# Patient Record
Sex: FEMALE | Race: WHITE | NOT HISPANIC OR LATINO | Employment: FULL TIME | URBAN - METROPOLITAN AREA
[De-identification: names, ages, dates, MRNs, and addresses within clinical notes are randomized per-mention and may not be internally consistent; named-entity substitution may affect disease eponyms.]

---

## 2017-05-26 LAB — HEP C HIM: NORMAL

## 2018-06-07 LAB — PAP-ABSTRACT: NORMAL

## 2018-08-20 LAB — HIV 1&2 EXT: NORMAL

## 2018-11-07 ENCOUNTER — TRANSFERRED RECORDS (OUTPATIENT)
Dept: HEALTH INFORMATION MANAGEMENT | Facility: CLINIC | Age: 23
End: 2018-11-07

## 2018-12-13 ENCOUNTER — TRANSFERRED RECORDS (OUTPATIENT)
Dept: HEALTH INFORMATION MANAGEMENT | Facility: CLINIC | Age: 23
End: 2018-12-13

## 2019-07-09 ENCOUNTER — TRANSFERRED RECORDS (OUTPATIENT)
Dept: HEALTH INFORMATION MANAGEMENT | Facility: CLINIC | Age: 24
End: 2019-07-09

## 2019-07-09 LAB
CREAT SERPL-MCNC: 0.9 MG/DL (ref 0.4–1)
GFR SERPL CREATININE-BSD FRML MDRD: >60 ML/MIN/1.73M2
GLUCOSE SERPL-MCNC: 88 MG/DL (ref 70–99)
POTASSIUM SERPL-SCNC: 4.6 MEQ/L (ref 3.4–5.1)

## 2019-11-01 ENCOUNTER — OFFICE VISIT (OUTPATIENT)
Dept: URGENT CARE | Facility: URGENT CARE | Age: 24
End: 2019-11-01
Payer: COMMERCIAL

## 2019-11-01 VITALS
HEIGHT: 64 IN | SYSTOLIC BLOOD PRESSURE: 116 MMHG | TEMPERATURE: 98.2 F | HEART RATE: 85 BPM | DIASTOLIC BLOOD PRESSURE: 77 MMHG | OXYGEN SATURATION: 100 % | RESPIRATION RATE: 20 BRPM | WEIGHT: 152 LBS | BODY MASS INDEX: 25.95 KG/M2

## 2019-11-01 DIAGNOSIS — M26.609 TMJ (TEMPOROMANDIBULAR JOINT SYNDROME): ICD-10-CM

## 2019-11-01 DIAGNOSIS — G43.909 MIGRAINE WITHOUT STATUS MIGRAINOSUS, NOT INTRACTABLE, UNSPECIFIED MIGRAINE TYPE: Primary | ICD-10-CM

## 2019-11-01 PROCEDURE — 99204 OFFICE O/P NEW MOD 45 MIN: CPT | Mod: 25 | Performed by: PHYSICIAN ASSISTANT

## 2019-11-01 PROCEDURE — 96372 THER/PROPH/DIAG INJ SC/IM: CPT | Performed by: PHYSICIAN ASSISTANT

## 2019-11-01 RX ORDER — CYCLOBENZAPRINE HCL 10 MG
10 TABLET ORAL 2 TIMES DAILY PRN
Qty: 30 TABLET | Refills: 0 | Status: SHIPPED | OUTPATIENT
Start: 2019-11-01 | End: 2020-02-04

## 2019-11-01 RX ORDER — AMITRIPTYLINE HYDROCHLORIDE 10 MG/1
TABLET ORAL
COMMUNITY
Start: 2019-06-18 | End: 2020-01-08

## 2019-11-01 RX ORDER — DEXTROAMPHETAMINE SACCHARATE, AMPHETAMINE ASPARTATE, DEXTROAMPHETAMINE SULFATE AND AMPHETAMINE SULFATE 1.25; 1.25; 1.25; 1.25 MG/1; MG/1; MG/1; MG/1
TABLET ORAL
COMMUNITY
Start: 2019-06-18 | End: 2020-02-04

## 2019-11-01 RX ORDER — DEXTROAMPHETAMINE SACCHARATE, AMPHETAMINE ASPARTATE MONOHYDRATE, DEXTROAMPHETAMINE SULFATE AND AMPHETAMINE SULFATE 5; 5; 5; 5 MG/1; MG/1; MG/1; MG/1
20 CAPSULE, EXTENDED RELEASE ORAL
COMMUNITY
Start: 2019-06-18 | End: 2020-02-04

## 2019-11-01 RX ORDER — PROPRANOLOL HYDROCHLORIDE 60 MG/1
60 TABLET ORAL
COMMUNITY
End: 2020-12-11

## 2019-11-01 RX ORDER — KETOROLAC TROMETHAMINE 30 MG/ML
60 INJECTION, SOLUTION INTRAMUSCULAR; INTRAVENOUS ONCE
Qty: 2 ML | Refills: 0 | Status: SHIPPED | OUTPATIENT
Start: 2019-11-01 | End: 2020-02-04

## 2019-11-01 SDOH — HEALTH STABILITY: MENTAL HEALTH: HOW OFTEN DO YOU HAVE A DRINK CONTAINING ALCOHOL?: NEVER

## 2019-11-01 ASSESSMENT — MIFFLIN-ST. JEOR: SCORE: 1435.66

## 2019-11-01 ASSESSMENT — PAIN SCALES - GENERAL: PAINLEVEL: EXTREME PAIN (8)

## 2019-11-02 NOTE — NURSING NOTE
Clinic Administered Medication Documentation      Injectable Medication Documentation    Patient was given Ketorolac Tromethamine (Toradol). Prior to medication administration, verified patients identity using patient s name and date of birth. Please see MAR and medication order for additional information. Patient instructed to remain in clinic for 15 minutes.      Was entire vial of medication used? Yes  Vial/Syringe: Single dose vial  Expiration Date:  03/21  Was this medication supplied by the patient? No     The following medication was given:     MEDICATION: Ketorolac Tromethamine 60MG/2ML (30 mg/mL) (Toradol)  ROUTE: IM  SITE: Ventrogluteal - Left  DOSE: 60 mg  LOT #: [id number]  :  [employer]  EXPIRATION DATE:  03/21  NDC#: 54013-354-48     Clark Quinonez CMA

## 2019-11-02 NOTE — PROGRESS NOTES
"SUBJECTIVE:   Jemima Augustine is a 24 year old female seen in clinc today with concerns of a headache.  Has a history of migraine headaches.  Saw her neurologist about 1 year ago.  Currently uses propranolol and amitriptyline.  Had tried Imitrex in the past.  3 days ago had a Toradol injection which only relieved her pain for about a day.  Nausea, no vomiting.  No photophobia.  No blurry vision.  Gets migraine headaches approximately every 2 months.  No recent head injury.  She states she did have a concussion after falling approximately 1 year ago.  Tried one oral Toradol tablet yesterday.    She has a history of TMJ and has been given a marked guard by a dentist.  However she has not been using a mouthguard.  The mouthguard was fitted before her wisdom teeth came in.    Description of pain: dull pain, bilateral in the frontal area, wraps to both sides.   Severity of pain:5/10  Associated symptoms: nausea  Warning signs: None      Precipitating factors: not related to menses.       Past medical history and medications were reviewed and are up to date.    CONSTITUTIONAL: Negative for fatigue or fever.  EYES: Negative for eye problems.  ENT: neg for ST.  RESP: neg for SOB.  CV: Negative for chest pains.  GI: Negative for vomiting.  MUSCULOSKELETAL:  Negative for significant muscle or joint pains.  NEUROLOGIC: as above.  SKIN: Negative for rash.  PSYCH: Normal mentation for age.  -negative for dysuria    PMHX- TMJ  FMHX- no known diabetes  Social- nonsmoker    OBJECTIVE:  Blood pressure 116/77, pulse 85, temperature 98.2  F (36.8  C), temperature source Oral, resp. rate 20, height 1.628 m (5' 4.08\"), weight 68.9 kg (152 lb), SpO2 100 %.    General: No apparent distress, alert and oriented.  HEENT:  EOMI, PERRL, sclera and conjunctiva normal.   Neck:  supple, no lymphadenopathy  Chest:  Lungs clear to auscultation bilaterally.  Cardiovascular: regular rate and rhythm, no murmurs.  Musculoskeletal: no gross " deformities, normal muscle tone  Psychological:  Affect and mentation normal.  Speech fluent.  Judgement and insight intact.  Neurological:    Cranial nerves 2-12 are grossly normal.    Strength 5/5  and symmetric in upper and lower extremities.     Sensation to light touch grossly intact throughout    Reflexes 2+ and symmetrical at biceps, knees.    Gait is normal.  TMJ-significant left greater than right TMJ popping and clicking with opening and closing her mouth.  Temples nontender to palpation  Negative pronator drift.  No slurred speech  Smile symmetric.  Abdomen-soft, nontender.    ASSESSMENT:    ICD-10-CM    1. Migraine without status migrainosus, not intractable, unspecified migraine type G43.909 ketorolac (TORADOL) 60 MG/2ML injection     cyclobenzaprine (FLEXERIL) 10 MG tablet     INJECTION INTRAMUSCULAR OR SUB-Q     NEUROLOGY ADULT REFERRAL   2. TMJ (temporomandibular joint syndrome) M26.609 ketorolac (TORADOL) 60 MG/2ML injection     cyclobenzaprine (FLEXERIL) 10 MG tablet     INJECTION INTRAMUSCULAR OR SUB-Q     NEUROLOGY ADULT REFERRAL       PLAN: She just moved to [place].  She would like referral to a different neurologist.  I suspect that her headaches are not specific just to migraine.  I feel her TMJ is coming into play.  I told her to try her mouthguard.  If it makes the pain worse than she is instructed to see her dentist for another mouthguard.  We will add muscle relaxant at bedtime.  She may also use it during the day but should not drive if doing so.  She has oral Toradol but she is instructed not to use this at all.  If the Toradol injection does not help she should go to the emergency room or see her primary care physician back in 3 days.  Discussed worrisome signs or symptoms that warrant a trip to the ER.  Magali Cook PA-C

## 2019-12-03 ENCOUNTER — OFFICE VISIT (OUTPATIENT)
Dept: URGENT CARE | Facility: URGENT CARE | Age: 24
End: 2019-12-03
Payer: COMMERCIAL

## 2019-12-03 VITALS
HEART RATE: 93 BPM | TEMPERATURE: 98.3 F | WEIGHT: 150.6 LBS | BODY MASS INDEX: 25.79 KG/M2 | DIASTOLIC BLOOD PRESSURE: 77 MMHG | SYSTOLIC BLOOD PRESSURE: 125 MMHG | OXYGEN SATURATION: 97 %

## 2019-12-03 DIAGNOSIS — R51.9 ACUTE NONINTRACTABLE HEADACHE, UNSPECIFIED HEADACHE TYPE: Primary | ICD-10-CM

## 2019-12-03 DIAGNOSIS — F41.9 ANXIETY: ICD-10-CM

## 2019-12-03 PROBLEM — G43.909 MIGRAINES: Status: ACTIVE | Noted: 2019-12-03

## 2019-12-03 PROBLEM — R56.9 SEIZURE-LIKE ACTIVITY (H): Status: ACTIVE | Noted: 2019-06-18

## 2019-12-03 PROBLEM — F90.0 ADHD (ATTENTION DEFICIT HYPERACTIVITY DISORDER), INATTENTIVE TYPE: Status: ACTIVE | Noted: 2018-05-15

## 2019-12-03 PROCEDURE — 96372 THER/PROPH/DIAG INJ SC/IM: CPT | Performed by: NURSE PRACTITIONER

## 2019-12-03 PROCEDURE — 99214 OFFICE O/P EST MOD 30 MIN: CPT | Mod: 25 | Performed by: NURSE PRACTITIONER

## 2019-12-03 RX ORDER — KETOROLAC TROMETHAMINE 30 MG/ML
60 INJECTION, SOLUTION INTRAMUSCULAR; INTRAVENOUS ONCE
Status: COMPLETED | OUTPATIENT
Start: 2019-12-03 | End: 2019-12-03

## 2019-12-03 RX ORDER — LORAZEPAM 0.5 MG/1
0.5 TABLET ORAL EVERY 8 HOURS PRN
Qty: 10 TABLET | Refills: 0 | Status: SHIPPED | OUTPATIENT
Start: 2019-12-03 | End: 2019-12-03

## 2019-12-03 RX ADMIN — KETOROLAC TROMETHAMINE 60 MG: 30 INJECTION, SOLUTION INTRAMUSCULAR; INTRAVENOUS at 18:25

## 2019-12-03 ASSESSMENT — ENCOUNTER SYMPTOMS
NECK PAIN: 1
FEVER: 0
ABDOMINAL PAIN: 0
LIGHT-HEADEDNESS: 0
RHINORRHEA: 0
NAUSEA: 0
SORE THROAT: 0
HEADACHES: 1
DIZZINESS: 0
MYALGIAS: 1
VOMITING: 0
COUGH: 0

## 2019-12-03 NOTE — PROGRESS NOTES
SUBJECTIVE:   Jemima Augustine is a 24 year old female presenting with a chief complaint of   Chief Complaint   Patient presents with     Headache     Headache x 3-4 days       She is an established patient of [facility].    Headache    Onset of symptoms was 4day(s).  Course of illness is worsening  Severity moderate  Character of pain:aching and throbbing   Current and associated symptoms: light sensitivity and visual disturbances  Location of pain: frontal and temporal  Prodromal sx?:  No  History of Migranes: No  Is headache similar to previous: Yes  Predisposing factors: None  Treatment and measures tried: imitrex  Patient was seen about a month ago for migraine headaches, was thought that TMJ was also a factor in her headaches. Was started on flexeril and was also given referral to neurology. Patient states wasn't able to get appointment with neurology until January. States unsure if flexeril is helping.     Patient also discussed that she has had increase in anxiety. States specifically separation anxiety. History of being adopted. Patient states had hydroxyzine in the past, but states just made her sleepy, unsure if helped with anxiety.        Review of Systems   Constitutional: Negative for fever.   HENT: Negative for congestion, ear pain, rhinorrhea and sore throat.    Eyes: Positive for visual disturbance.   Respiratory: Negative for cough.    Gastrointestinal: Negative for abdominal pain, nausea and vomiting.   Musculoskeletal: Positive for myalgias and neck pain.   Skin: Negative for rash.   Neurological: Positive for headaches. Negative for dizziness and light-headedness.       History reviewed. No pertinent past medical history.  History reviewed. No pertinent family history.  Current Outpatient Medications   Medication Sig Dispense Refill     amitriptyline (ELAVIL) 25 MG tablet Take 1 tablet (25 mg) by mouth At Bedtime 30 tablet 0     amitriptyline (ELAVIL) 25 MG tablet Take 25 mg by mouth        amphetamine-dextroamphetamine (ADDERALL XR) 20 MG 24 hr capsule Take 20 mg by mouth       LORazepam (ATIVAN) 0.5 MG tablet Take 1 tablet (0.5 mg) by mouth every 8 hours as needed for anxiety 10 tablet 0     propranolol (INDERAL) 60 MG tablet Take 60 mg by mouth       amitriptyline (ELAVIL) 10 MG tablet        amphetamine-dextroamphetamine (ADDERALL) 5 MG tablet        Social History     Tobacco Use     Smoking status: Never Smoker     Smokeless tobacco: Never Used   Substance Use Topics     Alcohol use: Never     Frequency: Never       OBJECTIVE  /77   Pulse 93   Temp 98.3  F (36.8  C) (Oral)   Wt 68.3 kg (150 lb 9.6 oz)   SpO2 97%   BMI 25.79 kg/m      Physical Exam  Vitals signs and nursing note reviewed.   Constitutional:       Appearance: Normal appearance. She is well-developed.   HENT:      Head: Normocephalic and atraumatic.      Right Ear: Tympanic membrane, ear canal and external ear normal.      Left Ear: Tympanic membrane, ear canal and external ear normal.      Nose: Nose normal.      Mouth/Throat:      Pharynx: Oropharynx is clear.   Eyes:      Extraocular Movements: Extraocular movements intact.      Conjunctiva/sclera: Conjunctivae normal.      Pupils: Pupils are equal, round, and reactive to light.   Neck:      Musculoskeletal: Normal range of motion and neck supple.   Cardiovascular:      Rate and Rhythm: Normal rate and regular rhythm.      Heart sounds: Normal heart sounds.   Pulmonary:      Effort: Pulmonary effort is normal.      Breath sounds: Normal breath sounds and air entry.   Lymphadenopathy:      Head:      Right side of head: No submandibular or tonsillar adenopathy.      Left side of head: No submandibular or tonsillar adenopathy.      Cervical: No cervical adenopathy.   Skin:     General: Skin is warm and dry.      Capillary Refill: Capillary refill takes less than 2 seconds.   Neurological:      General: No focal deficit present.      Mental Status: She is alert and  oriented to person, place, and time.      GCS: GCS eye subscore is 4. GCS verbal subscore is 5. GCS motor subscore is 6.      Cranial Nerves: Cranial nerves are intact.   Psychiatric:         Behavior: Behavior is cooperative.           ASSESSMENT:      ICD-10-CM    1. Acute nonintractable headache, unspecified headache type R51 ketorolac (TORADOL) injection 60 mg     amitriptyline (ELAVIL) 25 MG tablet     KETOROLAC TROMETHAMINE 15MG     INJECTION INTRAMUSCULAR OR SUB-Q   2. Anxiety F41.9 LORazepam (ATIVAN) 0.5 MG tablet        Medical Decision Making:    Differential Diagnosis:  Headache:  Tension headache, Common migraine, anxiety    Serious Comorbid Conditions:  Adult:  None    PLAN:  Discussed with patient will do toradol injection here today. Discussed increasing her dose of amitriptyline to 25 mg at bedtime from 10 mg to see if would help manage her headaches better. Did also discussed anxiety. Was going to give patient a small amount of lorazepam for situational anxiety/separation anxietyand given prescription for the increased dose of amitriptyline however patient left clinic prior to recheck after toradol injection and continued discussed of treatment recommendations.     Followup:    If not improving or if condition worsens, follow up with your Primary Care Provider    Patient Instructions     Increase dose of amitriptyline to 25 mg at bedtime  Lorazepam as needed every 8 hours for anxiety  Follow up with neurology  Follow up with primary care provider regarding anxiety within 2 weeks      Patient Education     Self-Care for Headaches  Most headaches aren't serious and can be relieved with self-care. But some headaches may be a sign of another health problem like eye trouble or high blood pressure. To find the best treatment, learn what kind of headaches you get. For tension headaches, self-care will usually help. To treat migraines, ask your healthcare provider for advice. It is also possible to get both  "tension and migraine headaches. Self-care involves relieving the pain and avoiding headache  triggers  if you can.    Ways to reduce pain and tension  Try these steps:    Apply a cold compress or ice pack to the pain site.    Drink fluids. If nausea makes it hard to drink, try sucking on ice.    Rest. Protect yourself from bright light and loud noises.    Calm your emotions by imagining a peaceful scene.    Massage tight neck, shoulder, and head muscles.    To relax muscles, soak in a hot bath or use a hot shower.  Use medicines  Aspirin or other over-the-counter pain medicines, such as ibuprofen and acetaminophen, can relieve headache. Remember: Never give aspirin to anyone 18 years old or younger because of the risk of developing Reye syndrome. Use pain medicines only when needed. Certain prescription medicines, if taken too often, can lead to rebound headaches. Check with your healthcare provider or pharmacist about your medicines.  Track your headaches  Keeping a headache diary can help you and your healthcare provider identify what's causing your headaches:    Note when each headache happens.    Identify your activities and the foods you've eaten 6 to 8 hours before the headache began.    Look for any trends or \"triggers.\"  Signs of tension headache  Any of the following can be signs:    Dull pain or feeling of pressure in a tight band around your head    Pain in your neck or shoulders    Headache without a definite beginning or end    Headache after an activity such as driving or working on a computer  Signs of migraine  Any of the following can be signs:    Throbbing pain on one or both sides of your head    Nausea or vomiting    Extreme sensitivity to light, sound, and smells    Bright spots, flashes, or other visual changes    Pain or nausea so severe that you can't continue your daily activities  Call your healthcare provider   If you have any of the following symptoms, contact your healthcare " "provider:    A headache that lingers after a recent injury or bump to the head.    A fever with a stiff neck or pain when you bend your head toward your chest.    A headache along with slurred speech, changes in your vision, or numbness or weakness in your arms or legs.    A headache for longer than 3 days.    Frequent headaches, especially in the morning.    Headaches with seizures     Seek immediate medical attention if you have a headache that you would call \"the worst headache you have ever had.\"  Date Last Reviewed: 3/1/2018    8984-8866 [employer]. [address] [place], [place] [id number]. All rights reserved. This information is not intended as a substitute for professional medical care. Always follow your healthcare professional's instructions.           Patient Education     Your Body s Response to Anxiety    Normal anxiety is part of the body s natural defense system. It's an alert to a threat that is unknown, vague, or comes from your own internal fears. While you re in this state, your feelings can range from a vague sense of worry to physical sensations such as a pounding heartbeat. These feelings make you want to react to the threat. An anxiety response is normal in many situations. But when you have an anxiety disorder, the same response can occur at the wrong times.  Anxiety can be helpful  Normal anxiety is a signal from your brain that warns you of a threat and is a normal response to help you prevent something or decrease the bad effects of something you can't control. For example, anxiety is a normal response to situations that might damage your body, separate you from a loved one, or lose your job. The symptoms of anxiety can be physical and mental.  How does it feel?  At certain times, people with anxiety may have:    Dizziness    Muscle tension or pain    Restlessness    Sleeplessness    Trouble concentrating    Racing heartbeat    Fast breathing    Shaking or " "trembling    Stomachache    Diarrhea    Loss of energy    Sweating    Cold, clammy hands    Chest pain    Dry mouth  Anxiety can also be a problem  Anxiety can become a problem when it is hard to control, occurs for months, and interferes with important parts of your life. With an anxiety disorder, your body has the response described above, but in inappropriate ways. The response a person has depends on the anxiety disorder he or she has. With some disorders, the anxiety is way out of proportion to the threat that triggers it. With others, anxiety may occur even when there isn t a clear threat or trigger.  Who does it affect?  Some people are more prone to persistent anxiety than others. It tends to run in families, and it affects more younger people than older people, and more women than men. But no age, race, or gender is immune to anxiety problems.  Anxiety can be treated  The good news is that the anxiety that s disrupting your life can be treated. Check with your healthcare provider and rule out any physical problems that may be causing the anxiety symptoms. If an anxiety disorder is diagnosed seek mental healthcare. This is an illness and it can respond to treatment. Most types of anxiety disorders will respond to \"talk therapy\" and medicines. Working with your doctor or other healthcare provider, you can develop skills to help you cope with anxiety. You can also gain the perspective you need to overcome your fears. Note: Good sources of support or guidance can be found at your local hospital, mental health clinic, or an employee assistance program.  How to cope with anxiety  If anxiety is wearing you down, here are some things you can do to cope:    Keep in mind that you can t control everything about a situation. Change what you can and let the rest take its course.    Exercise--it s a great way to relieve tension and help your body feel relaxed.    Avoid caffeine and nicotine, which can make anxiety " symptoms worse.    Fight the temptation to turn to alcohol or unprescribed drugs for relief. They only make things worse in the long run.    Educate yourself about anxiety disorders. Keep track of helpful online resources and books you can use during stressful periods.    Try stress management techniques such as meditation.    Consider online or in-person support groups.   Date Last Reviewed: 1/1/2017 2000-2018 [employer]. [address] [place] [address], [place] [address]. All rights reserved. This information is not intended as a substitute for professional medical care. Always follow your healthcare professional's instructions.

## 2019-12-04 NOTE — NURSING NOTE
Clinic Administered Medication Documentation    MEDICATION LIST:   Injectable Medication Documentation    Patient was given Ketorolac Tromethamine (Toradol). Prior to medication administration, verified patients identity using patient s name and date of birth. Please see MAR and medication order for additional information. Patient instructed to remain in clinic for 15 minutes and report any adverse reaction to staff immediately .      Was entire vial of medication used? Yes  Vial/Syringe: Single dose vial  Expiration Date:  07/21  Was this medication supplied by the patient? No     The following medication was given:     MEDICATION: Ketorolac Tromethamine 60MG/2ML (30 mg/mL) (Toradol)  ROUTE: IM  SITE: Ventrogluteal - Right  DOSE: 60mg  LOT #: [id number]  :  [employer]  EXPIRATION DATE:  07/21  NDC#: 37203-510-94    Alexandria Agarwal CMA

## 2019-12-04 NOTE — PATIENT INSTRUCTIONS
"  Follow up with neurology  Follow up with primary care provider regarding anxiety within 2 weeks      Patient Education     Self-Care for Headaches  Most headaches aren't serious and can be relieved with self-care. But some headaches may be a sign of another health problem like eye trouble or high blood pressure. To find the best treatment, learn what kind of headaches you get. For tension headaches, self-care will usually help. To treat migraines, ask your healthcare provider for advice. It is also possible to get both tension and migraine headaches. Self-care involves relieving the pain and avoiding headache  triggers  if you can.    Ways to reduce pain and tension  Try these steps:    Apply a cold compress or ice pack to the pain site.    Drink fluids. If nausea makes it hard to drink, try sucking on ice.    Rest. Protect yourself from bright light and loud noises.    Calm your emotions by imagining a peaceful scene.    Massage tight neck, shoulder, and head muscles.    To relax muscles, soak in a hot bath or use a hot shower.  Use medicines  Aspirin or other over-the-counter pain medicines, such as ibuprofen and acetaminophen, can relieve headache. Remember: Never give aspirin to anyone 18 years old or younger because of the risk of developing Reye syndrome. Use pain medicines only when needed. Certain prescription medicines, if taken too often, can lead to rebound headaches. Check with your healthcare provider or pharmacist about your medicines.  Track your headaches  Keeping a headache diary can help you and your healthcare provider identify what's causing your headaches:    Note when each headache happens.    Identify your activities and the foods you've eaten 6 to 8 hours before the headache began.    Look for any trends or \"triggers.\"  Signs of tension headache  Any of the following can be signs:    Dull pain or feeling of pressure in a tight band around your head    Pain in your neck or " "shoulders    Headache without a definite beginning or end    Headache after an activity such as driving or working on a computer  Signs of migraine  Any of the following can be signs:    Throbbing pain on one or both sides of your head    Nausea or vomiting    Extreme sensitivity to light, sound, and smells    Bright spots, flashes, or other visual changes    Pain or nausea so severe that you can't continue your daily activities  Call your healthcare provider   If you have any of the following symptoms, contact your healthcare provider:    A headache that lingers after a recent injury or bump to the head.    A fever with a stiff neck or pain when you bend your head toward your chest.    A headache along with slurred speech, changes in your vision, or numbness or weakness in your arms or legs.    A headache for longer than 3 days.    Frequent headaches, especially in the morning.    Headaches with seizures     Seek immediate medical attention if you have a headache that you would call \"the worst headache you have ever had.\"  Date Last Reviewed: 3/1/2018    3068-1310 [employer]. [address] [place] [address]. All rights reserved. This information is not intended as a substitute for professional medical care. Always follow your healthcare professional's instructions.           Patient Education     Your Body s Response to Anxiety    Normal anxiety is part of the body s natural defense system. It's an alert to a threat that is unknown, vague, or comes from your own internal fears. While you re in this state, your feelings can range from a vague sense of worry to physical sensations such as a pounding heartbeat. These feelings make you want to react to the threat. An anxiety response is normal in many situations. But when you have an anxiety disorder, the same response can occur at the wrong times.  Anxiety can be helpful  Normal anxiety is a signal from your brain that warns you of a threat " "and is a normal response to help you prevent something or decrease the bad effects of something you can't control. For example, anxiety is a normal response to situations that might damage your body, separate you from a loved one, or lose your job. The symptoms of anxiety can be physical and mental.  How does it feel?  At certain times, people with anxiety may have:    Dizziness    Muscle tension or pain    Restlessness    Sleeplessness    Trouble concentrating    Racing heartbeat    Fast breathing    Shaking or trembling    Stomachache    Diarrhea    Loss of energy    Sweating    Cold, clammy hands    Chest pain    Dry mouth  Anxiety can also be a problem  Anxiety can become a problem when it is hard to control, occurs for months, and interferes with important parts of your life. With an anxiety disorder, your body has the response described above, but in inappropriate ways. The response a person has depends on the anxiety disorder he or she has. With some disorders, the anxiety is way out of proportion to the threat that triggers it. With others, anxiety may occur even when there isn t a clear threat or trigger.  Who does it affect?  Some people are more prone to persistent anxiety than others. It tends to run in families, and it affects more younger people than older people, and more women than men. But no age, race, or gender is immune to anxiety problems.  Anxiety can be treated  The good news is that the anxiety that s disrupting your life can be treated. Check with your healthcare provider and rule out any physical problems that may be causing the anxiety symptoms. If an anxiety disorder is diagnosed seek mental healthcare. This is an illness and it can respond to treatment. Most types of anxiety disorders will respond to \"talk therapy\" and medicines. Working with your doctor or other healthcare provider, you can develop skills to help you cope with anxiety. You can also gain the perspective you need to " overcome your fears. Note: Good sources of support or guidance can be found at your local hospital, mental health clinic, or an employee assistance program.  How to cope with anxiety  If anxiety is wearing you down, here are some things you can do to cope:    Keep in mind that you can t control everything about a situation. Change what you can and let the rest take its course.    Exercise--it s a great way to relieve tension and help your body feel relaxed.    Avoid caffeine and nicotine, which can make anxiety symptoms worse.    Fight the temptation to turn to alcohol or unprescribed drugs for relief. They only make things worse in the long run.    Educate yourself about anxiety disorders. Keep track of helpful online resources and books you can use during stressful periods.    Try stress management techniques such as meditation.    Consider online or in-person support groups.   Date Last Reviewed: 1/1/2017 2000-2018 [employer] [address] [place] [address], [place] [address]. All rights reserved. This information is not intended as a substitute for professional medical care. Always follow your healthcare professional's instructions.

## 2019-12-06 ENCOUNTER — OFFICE VISIT (OUTPATIENT)
Dept: URGENT CARE | Facility: URGENT CARE | Age: 24
End: 2019-12-06
Payer: COMMERCIAL

## 2019-12-06 VITALS
WEIGHT: 151.4 LBS | HEART RATE: 90 BPM | SYSTOLIC BLOOD PRESSURE: 118 MMHG | TEMPERATURE: 97.6 F | OXYGEN SATURATION: 100 % | DIASTOLIC BLOOD PRESSURE: 75 MMHG | BODY MASS INDEX: 25.93 KG/M2

## 2019-12-06 DIAGNOSIS — J02.9 SORE THROAT: Primary | ICD-10-CM

## 2019-12-06 DIAGNOSIS — R49.0 HOARSENESS: ICD-10-CM

## 2019-12-06 DIAGNOSIS — J06.9 VIRAL UPPER RESPIRATORY TRACT INFECTION WITH COUGH: ICD-10-CM

## 2019-12-06 DIAGNOSIS — R51.9 ACUTE NONINTRACTABLE HEADACHE, UNSPECIFIED HEADACHE TYPE: ICD-10-CM

## 2019-12-06 LAB
DEPRECATED S PYO AG THROAT QL EIA: NORMAL
SPECIMEN SOURCE: NORMAL

## 2019-12-06 PROCEDURE — 87081 CULTURE SCREEN ONLY: CPT | Performed by: PHYSICIAN ASSISTANT

## 2019-12-06 PROCEDURE — 87880 STREP A ASSAY W/OPTIC: CPT | Performed by: PHYSICIAN ASSISTANT

## 2019-12-06 PROCEDURE — 99214 OFFICE O/P EST MOD 30 MIN: CPT | Performed by: PHYSICIAN ASSISTANT

## 2019-12-06 RX ORDER — BENZONATATE 100 MG/1
CAPSULE ORAL
Qty: 20 CAPSULE | Refills: 0 | Status: SHIPPED | OUTPATIENT
Start: 2019-12-06 | End: 2020-02-04

## 2019-12-06 RX ORDER — METHYLPREDNISOLONE 4 MG
TABLET, DOSE PACK ORAL
Qty: 21 TABLET | Refills: 0 | Status: SHIPPED | OUTPATIENT
Start: 2019-12-06 | End: 2020-02-04

## 2019-12-06 NOTE — PROGRESS NOTES
S: 24-year-old female presents for evaluation of sore throat for  3 days.  Onset of cough yesterday. Severe hoarseness.   Cough is nonproductive.  No fever.  No vomiting or diarrhea.  No rash.  Has had headache with it.  Has history of migraines.  I did see her for this not too long ago and was given referral to neurology.  She states she has an appointment in January.        Allergies   Allergen Reactions     Lisdexamfetamine Other (See Comments)        past medical history-history of migraines     amitriptyline (ELAVIL) 10 MG tablet,   amitriptyline (ELAVIL) 25 MG tablet, Take 25 mg by mouth  amphetamine-dextroamphetamine (ADDERALL) 5 MG tablet,   propranolol (INDERAL) 60 MG tablet, Take 60 mg by mouth  [] amphetamine-dextroamphetamine (ADDERALL XR) 20 MG 24 hr capsule, Take 20 mg by mouth  [] cyclobenzaprine (FLEXERIL) 10 MG tablet, Take 1 tablet (10 mg) by mouth 2 times daily as needed for muscle spasms  [] ketorolac (TORADOL) 60 MG/2ML injection, Inject 2 mLs (60 mg) into the muscle once for 1 dose    No current facility-administered medications on file prior to visit.       Social History     Tobacco Use     Smoking status: Never Smoker     Smokeless tobacco: Never Used   Substance Use Topics     Alcohol use: Never     Frequency: Never       ROS:  CONSTITUTIONAL: Negative for fatigue or fever.  EYES: Negative for eye problems.  ENT: As above.  RESP: As above.  CV: Negative for chest pains.  GI: Negative for vomiting.  MUSCULOSKELETAL:  Negative for significant muscle or joint pains.  NEUROLOGIC: Negative for headaches.  SKIN: Negative for rash.  PSYCH: Normal mentation for age.    OBJECTIVE:  /75 (BP Location: Left arm, Patient Position: Chair, Cuff Size: Adult Regular)   Pulse 90   Temp 97.6  F (36.4  C) (Oral)   Wt 68.7 kg (151 lb 6.4 oz)   SpO2 100%   BMI 25.93 kg/m    GENERAL APPEARANCE: very hoarse voice..  EYES:Conjunctiva/sclera clear.  EARS: No cerumen.   Ear canals w/o  erythema.  TM's intact w/o erythema.    NOSE/MOUTH: Nose without ulcers, erythema or lesions.  SINUSES: No maxillary sinus tenderness.  THROAT: Moderate to severe erythema w/o tonsillar enlargement . No exudates.  NECK: Supple, nontender, no lymphadenopathy.  RESP: Lungs clear to auscultation - no rales, rhonchi or wheezes  CV: Regular rate and rhythm, normal S1 S2, no murmur noted.  NEURO: Awake, alert    SKIN: No rashes    Rapid strep was neg    ASSESSMENT:     ICD-10-CM    1. Sore throat J02.9 Rapid strep screen     methylPREDNISolone (MEDROL DOSEPAK) 4 MG tablet therapy pack     benzonatate (TESSALON PERLES) 100 MG capsule     Beta strep group A culture   2. Viral upper respiratory tract infection with cough J06.9 Rapid strep screen    B97.89 methylPREDNISolone (MEDROL DOSEPAK) 4 MG tablet therapy pack     benzonatate (TESSALON PERLES) 100 MG capsule     Beta strep group A culture   3. Hoarseness R49.0 Rapid strep screen     methylPREDNISolone (MEDROL DOSEPAK) 4 MG tablet therapy pack     benzonatate (TESSALON PERLES) 100 MG capsule     Beta strep group A culture   4. Acute nonintractable headache, unspecified headache type R51            PLAN: Throat is very red here today.  Throat culture is pending.  Medrol Dosepak will help with the sore throat, hoarseness, cough.  Tessalon Perles can help with both the sore throat and the cough.  Medrol Dosepak may also help with the headache.  I have discussed clinical findings with patient.  Keep appointment with neurologist regarding her recurrent headaches/migraines.  Side effects of medications discussed.  Symptomatic care is discussed.  I have discussed the possibility of  worsening symptoms and to RETURN TO CLINIC 1 week prn.  All questions are answered and patient is in agreement with plan.   Patient care instructions are given to at the end of visit.   Lots of rest and fluids.    Magali Cook PA-C

## 2019-12-07 LAB
BACTERIA SPEC CULT: NORMAL
SPECIMEN SOURCE: NORMAL

## 2020-01-08 ENCOUNTER — OFFICE VISIT (OUTPATIENT)
Dept: NEUROLOGY | Facility: CLINIC | Age: 25
End: 2020-01-08
Attending: PHYSICIAN ASSISTANT
Payer: COMMERCIAL

## 2020-01-08 VITALS
RESPIRATION RATE: 14 BRPM | WEIGHT: 151 LBS | SYSTOLIC BLOOD PRESSURE: 132 MMHG | DIASTOLIC BLOOD PRESSURE: 76 MMHG | BODY MASS INDEX: 25.78 KG/M2 | HEART RATE: 83 BPM | OXYGEN SATURATION: 97 % | HEIGHT: 64 IN

## 2020-01-08 DIAGNOSIS — G43.009 MIGRAINE WITHOUT AURA AND WITHOUT STATUS MIGRAINOSUS, NOT INTRACTABLE: Primary | ICD-10-CM

## 2020-01-08 PROCEDURE — 99204 OFFICE O/P NEW MOD 45 MIN: CPT | Performed by: PSYCHIATRY & NEUROLOGY

## 2020-01-08 RX ORDER — RIZATRIPTAN BENZOATE 10 MG/1
10 TABLET, ORALLY DISINTEGRATING ORAL
Qty: 9 TABLET | Refills: 1 | Status: SHIPPED | OUTPATIENT
Start: 2020-01-08 | End: 2020-09-10

## 2020-01-08 RX ORDER — SUMATRIPTAN 100 MG/1
TABLET, FILM COATED ORAL
COMMUNITY
Start: 2019-07-17 | End: 2020-01-08

## 2020-01-08 RX ORDER — KETOROLAC TROMETHAMINE 10 MG/1
TABLET, FILM COATED ORAL
COMMUNITY
Start: 2019-06-18 | End: 2020-02-04

## 2020-01-08 ASSESSMENT — MIFFLIN-ST. JEOR: SCORE: 1429.93

## 2020-01-08 ASSESSMENT — PAIN SCALES - GENERAL: PAINLEVEL: MODERATE PAIN (4)

## 2020-01-08 NOTE — NURSING NOTE
"Jemima Augustine's goals for this visit include: Consult  She requests these members of her care team be copied on today's visit information: PCP    PCP: Bianca Gallagher    Referring Provider:  [place] [address]    /76 (BP Location: Right arm, Patient Position: Sitting, Cuff Size: Adult Regular)   Pulse 83   Resp 14   Ht 1.626 m (5' 4\")   Wt 68.5 kg (151 lb)   SpO2 97%   BMI 25.92 kg/m      Do you need any medication refills at today's visit? N    " Vitals obtained. Allergies and medications reviewed verbally with patient via Epic.

## 2020-01-08 NOTE — LETTER
1/8/2020         RE: Jemima Augustine  [address]  [place] [address]        Dear Colleague,    Thank you for referring your patient, Jemima Augustine, to the [facility]. Please see a copy of my visit note below.    Visit Date:   01/08/2020      NEUROLOGY CONSULTATION      HISTORY:  This patient is a 24-year-old right-handed woman seen for evaluation of headache.  She is here with her fiance, Gavin.  She reports that she has had problems with headaches going back many years.  What has been happening the last several months is that she has been going to urgent care for injections of Toradol every 2-4 weeks.  She is looking for alternative treatments.  She reports that she has migraine type headache.  These tend to come on in the day.  It is a global pain and pressure.  It will build up over a day or more.  She has been on propranolol and amitriptyline.  These medicines have helped reduce the frequency of the headache.  Her dose of propranolol was up to 90 mg, but she did have a syncopal event at that dose a year or 2 ago.  Now she is down to 60 mg.  She does not think it helps the headache much, but it does help her anxiety.  She has been on amitriptyline and that dose was increased to 25 mg recently.  It has not really helped much and it does not help her to sleep.  If she has a headache, her sleep is poor.  She cannot get to sleep with the bad headache.  In addition to the migraine type headache.  She has a more daily type of headache.  The migraine headache is associated with photophobia and phonophobia and possibly nausea and vomiting.  The more regular type headache can be frontal or occipital or 1 sided.  When she has it she might take ibuprofen or Imitrex.  She has not found these interventions to be particularly effective.  The dose of Imitrex is 100 mg.  What has been working lately as Toradol shots.  She does have a headache today.  It is a left  frontal type headache.  Sometimes it is suboccipital and there can be focal tenderness.  She also has been on Adderall, but does not take it every day.  She thinks it makes the headache worse and that is a side effect of the medicine.  She does not have issues with vision other than the sensitivity to lights with the migraine.  She does not exercise on a regular basis.  Her diet is adequate.      PAST MEDICAL HISTORY:  Largely noncontributory.  She does not have high blood pressure, diabetes, thyroid or asthma.  She is not on birth control.  She is not pregnant.  There has been no pertinent surgery or trauma to the head or neck.  She does not really drink or smoke.  She does not drink much in the way of caffeine.      SOCIAL HISTORY:  She is unmarried without children.  She teaches sixth grade math.      FAMILY HISTORY:  Not known, as the patient is adopted.      REVIEW OF SYSTEMS:  She did fill out a medical history form including a 14-point review of systems.  There is nothing to add to the history given above.      PHYSICAL EXAMINATION:   GENERAL:  The patient is cooperative and in no distress.   VITAL SIGNS:  Her blood pressure is 132/76.   NECK:  There are no carotid bruits.   HEART:  Auscultation of the heart shows S1 and S2.   NEUROLOGIC:  The patient is alert, oriented and lucid.   CRANIAL NERVES:  Shows full visual fields to confrontation.  Funduscopic exam shows sharp discs bilaterally.  Visual acuity 20/20 bilaterally.  Eye movements are complete and conjugate without nystagmus.  Pupils react to light.  Facial sensation is normal.  Face moves symmetrically.  Palate elevates in the midline.  Tongue protrudes in the midline.   MOTOR:  Evaluation shows no pronator drift, normal finger tapping, finger-nose-finger and heel-knee-shin.  She has good strength in the arms and legs.  Muscle stretch reflexes are low amplitude and symmetric.  Toes are downgoing.   SENSORY:  Exam shows preserved vibration and  temperature.   GAIT, STATION AND COORDINATION:  Romberg sign is absent.  She can walk on her heels, toes and tandem.      ASSESSMENT:  Mixed headache disorder, including common migraine and probable tension headache.      DISCUSSION:  The patient is seen with the above problem list.  Her exam is normal.  I had a discussion with her about treatment of migraine and tension headache, namely with interventional treatments and then preventive treatments.  The propranolol worked for a while but she cannot tolerate a higher dose.  She continues on it because it helps her anxiety, which I think is reasonable.  However, for prevention, I am going to increase the dose of the amitriptyline to 50 mg at night.  This should help her to sleep as well.  I told her to give it 2 weeks at this higher dose.  If it is ineffective at reducing the frequency and severity of the headache then it will be discontinued and a trial of topiramate undertaken.  I told her to discontinue the sumatriptan since that has not been effective.  If she gets a bad headache, she should drink a quart of water at room temperature as quickly as possible.  I have given her a prescription for rizatriptan.  If this is ineffective, then an alternative will have to be found.  In the meantime, I am going refer her to the [facility] to see if she may be a candidate for one of the newer agents.  I will see her in followup on an as as-needed basis.         TOD JEAN MD             D: 2020   T: 2020   MT: WT      Name:     HOLLI CRUZ   MRN:      [id number]        Account:      [id number]   :      1995           Visit Date:   2020      Document: [id number]       Again, thank you for allowing me to participate in the care of your patient.        Sincerely,        Tod Jean MD

## 2020-01-08 NOTE — PROGRESS NOTES
Visit Date:   01/08/2020      NEUROLOGY CONSULTATION      HISTORY:  This patient is a 24-year-old right-handed woman seen for evaluation of headache.  She is here with her fianceGavin.  She reports that she has had problems with headaches going back many years.  What has been happening the last several months is that she has been going to urgent care for injections of Toradol every 2-4 weeks.  She is looking for alternative treatments.  She reports that she has migraine type headache.  These tend to come on in the day.  It is a global pain and pressure.  It will build up over a day or more.  She has been on propranolol and amitriptyline.  These medicines have helped reduce the frequency of the headache.  Her dose of propranolol was up to 90 mg, but she did have a syncopal event at that dose a year or 2 ago.  Now she is down to 60 mg.  She does not think it helps the headache much, but it does help her anxiety.  She has been on amitriptyline and that dose was increased to 25 mg recently.  It has not really helped much and it does not help her to sleep.  If she has a headache, her sleep is poor.  She cannot get to sleep with the bad headache.  In addition to the migraine type headache.  She has a more daily type of headache.  The migraine headache is associated with photophobia and phonophobia and possibly nausea and vomiting.  The more regular type headache can be frontal or occipital or 1 sided.  When she has it she might take ibuprofen or Imitrex.  She has not found these interventions to be particularly effective.  The dose of Imitrex is 100 mg.  What has been working lately as Toradol shots.  She does have a headache today.  It is a left frontal type headache.  Sometimes it is suboccipital and there can be focal tenderness.  She also has been on Adderall, but does not take it every day.  She thinks it makes the headache worse and that is a side effect of the medicine.  She does not have issues with vision  other than the sensitivity to lights with the migraine.  She does not exercise on a regular basis.  Her diet is adequate.      PAST MEDICAL HISTORY:  Largely noncontributory.  She does not have high blood pressure, diabetes, thyroid or asthma.  She is not on birth control.  She is not pregnant.  There has been no pertinent surgery or trauma to the head or neck.  She does not really drink or smoke.  She does not drink much in the way of caffeine.      SOCIAL HISTORY:  She is unmarried without children.  She teaches sixth grade math.      FAMILY HISTORY:  Not known, as the patient is adopted.      REVIEW OF SYSTEMS:  She did fill out a medical history form including a 14-point review of systems.  There is nothing to add to the history given above.      PHYSICAL EXAMINATION:   GENERAL:  The patient is cooperative and in no distress.   VITAL SIGNS:  Her blood pressure is 132/76.   NECK:  There are no carotid bruits.   HEART:  Auscultation of the heart shows S1 and S2.   NEUROLOGIC:  The patient is alert, oriented and lucid.   CRANIAL NERVES:  Shows full visual fields to confrontation.  Funduscopic exam shows sharp discs bilaterally.  Visual acuity 20/20 bilaterally.  Eye movements are complete and conjugate without nystagmus.  Pupils react to light.  Facial sensation is normal.  Face moves symmetrically.  Palate elevates in the midline.  Tongue protrudes in the midline.   MOTOR:  Evaluation shows no pronator drift, normal finger tapping, finger-nose-finger and heel-knee-shin.  She has good strength in the arms and legs.  Muscle stretch reflexes are low amplitude and symmetric.  Toes are downgoing.   SENSORY:  Exam shows preserved vibration and temperature.   GAIT, STATION AND COORDINATION:  Romberg sign is absent.  She can walk on her heels, toes and tandem.      ASSESSMENT:  Mixed headache disorder, including common migraine and probable tension headache.      DISCUSSION:  The patient is seen with the above problem  list.  Her exam is normal.  I had a discussion with her about treatment of migraine and tension headache, namely with interventional treatments and then preventive treatments.  The propranolol worked for a while but she cannot tolerate a higher dose.  She continues on it because it helps her anxiety, which I think is reasonable.  However, for prevention, I am going to increase the dose of the amitriptyline to 50 mg at night.  This should help her to sleep as well.  I told her to give it 2 weeks at this higher dose.  If it is ineffective at reducing the frequency and severity of the headache then it will be discontinued and a trial of topiramate undertaken.  I told her to discontinue the sumatriptan since that has not been effective.  If she gets a bad headache, she should drink a quart of water at room temperature as quickly as possible.  I have given her a prescription for rizatriptan.  If this is ineffective, then an alternative will have to be found.  In the meantime, I am going refer her to the [facility] to see if she may be a candidate for one of the newer agents.  I will see her in followup on an as as-needed basis.         WIN PITTS MD             D: 2020   T: 2020   MT: WT      Name:     HOLLI CRUZ   MRN:      [id number]        Account:      [id number]   :      1995           Visit Date:   2020      Document: [id number]

## 2020-01-13 ENCOUNTER — PRE VISIT (OUTPATIENT)
Dept: NEUROLOGY | Facility: CLINIC | Age: 25
End: 2020-01-13

## 2020-01-13 NOTE — TELEPHONE ENCOUNTER
FUTURE VISIT INFORMATION      FUTURE VISIT INFORMATION:    Date: 2/17/2020    Time: 330pm    Location: [facility] [place]  REFERRAL INFORMATION:    Referring provider:  Dr. Jean     Referring providers clinic:  [facility]     Reason for visit/diagnosis  Migraines     RECORDS REQUESTED FROM:       Clinic name Comments Records Status Imaging Status   [facility]   Requested Requested    [facility] Everywhere N/A    [facility] Everywhere N/A                        -1/31/2020-Records from [facility] Scanned to Media tab-MR 1/30/2020

## 2020-01-22 ENCOUNTER — TELEPHONE (OUTPATIENT)
Dept: NEUROLOGY | Facility: CLINIC | Age: 25
End: 2020-01-22

## 2020-01-22 NOTE — TELEPHONE ENCOUNTER
[facility] Call Center    Phone Message    May a detailed message be left on voicemail: yes    Reason for Call: Medication Question or concern regarding medication   Prescription Clarification  Name of Medication: amitriptyline (ELAVIL) 25 MG tablet 60 tablet 1 1/8/2020  No  Sig - Route: Take 2 tablets (50 mg) by mouth At Bedtime - OralPrescribing Provider:  Dr. Jean, Tod Rueda MD   Pharmacy: [facility] [place] - [address] [facility]   What on the order needs clarification? Since increasing dosage from 1 pill to 2 pills at bedtime.  She has fainted 3 times in the morning.  Please call her: [phone]    Action Taken: Message routed to:  [facility] ([facility] Neurology

## 2020-01-23 NOTE — TELEPHONE ENCOUNTER
I called Jemima back and left a voicemail. I asked her to cut back her amitriptyline back to 1 tablet at bedtime due to her fainting and let her know we will probably wean her off due to her side effects and that it did not seem to help her migraines. I will inform Dr. Jean of her symptoms. I asked she call back and discuss with me some more.

## 2020-01-24 ENCOUNTER — TELEPHONE (OUTPATIENT)
Dept: NEUROLOGY | Facility: CLINIC | Age: 25
End: 2020-01-24

## 2020-01-24 NOTE — TELEPHONE ENCOUNTER
The pt was informed of Dr Jean's response on her VM (permission granted). She was encouraged to call back to let us know what she would like to do.  Marsha Reaves, RNCC  Neurology

## 2020-01-24 NOTE — TELEPHONE ENCOUNTER
[facility]    Phone Message    May a detailed message be left on voicemail: yes    Reason for Call: Other: Jemima returning Tiffany's call.  Please call her back at your earliest convenience.  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     Action Taken: Message routed to:  [facility] ([facility]

## 2020-01-24 NOTE — TELEPHONE ENCOUNTER
I left pt a voicemail. I updated her on plan that Dr. Jean came up with yesterday. We would like her to stop the amitriptyline right away as she did have the fainting spells. Dr. Jean recommended perhaps trying topiramate instead. I let her know this medication can cause kidney stones so she needs to drink plenty of fluids while taking. If she has a history of kidney stones we would avoid this medication. I also let her know we can wait until she see's Anne MIRZA CNP to decide on adding in another medication.     I asked she call back to let me know what she would like to do.     Tiffany EASTON

## 2020-01-24 NOTE — TELEPHONE ENCOUNTER
Tod Jean MD Vining, Kate, RUSTAM Yesterday (8:51 AM)      I left a message for her to call you or me.  I said in the message that she should stop amitriptyline if she had a fainting event.  We could try topiramate, or wait for her appt with Anne.  maday

## 2020-01-31 NOTE — PATIENT INSTRUCTIONS
Reminders:     Please remember to arrive 5-10 minutes early for your appointments. If you are late you may need to reschedule your appointment.    If you have mychart please be aware your results and communications will be sent within your mychart. Unless results are critical and/or urgent value.       Patient Education     Self-Care for Headaches    Most headaches aren't serious and can be relieved with self-care. But some headaches may be a sign of another health problem like eye trouble or high blood pressure. To find the best treatment, learn what kind of headaches you get. For tension headaches, self-care will usually help. To treat migraines, ask your healthcare provider for advice. It is also possible to get both tension and migraine headaches. Self-care involves relieving the pain and avoiding headache  triggers  if you can.  Ways to reduce pain and tension  Try these steps:    Apply a cold compress or ice pack to the pain site.    Drink fluids. If nausea makes it hard to drink, try sucking on ice.    Rest. Protect yourself from bright light and loud noises.    Calm your emotions by imagining a peaceful scene.    Massage tight neck, shoulder, and head muscles.    To relax muscles, soak in a hot bath or use a hot shower.  Use medicines  Aspirin or other over-the-counter pain medicines, such as ibuprofen and acetaminophen, can relieve headache. Remember: Never give aspirin to anyone 18 years old or younger because of the risk of developing Reye syndrome. Use pain medicines only when needed. Certain prescription medicines, if taken too often, can lead to rebound headaches. Check with your healthcare provider or pharmacist about your medicines.  Track your headaches  Keeping a headache diary can help you and your healthcare provider identify what's causing your headaches:    Note when each headache happens.    Identify your activities and the foods you've eaten 6 to 8 hours before the headache began.    Look for  "any trends or \"triggers.\"  Signs of tension headache  Any of the following can be signs:    Dull pain or feeling of pressure in a tight band around your head    Pain in your neck or shoulders    Headache without a definite beginning or end    Headache after an activity such as driving or working on a computer  Signs of migraine  Any of the following can be signs:    Throbbing pain on one or both sides of your head    Nausea or vomiting    Extreme sensitivity to light, sound, and smells    Bright spots, flashes, or other visual changes    Pain or nausea so severe that you can't continue your daily activities  Call your healthcare provider   If you have any of the following symptoms, contact your healthcare provider:    A headache that lingers after a recent injury or bump to the head.    A fever with a stiff neck or pain when you bend your head toward your chest.    A headache along with slurred speech, changes in your vision, or numbness or weakness in your arms or legs.    A headache for longer than 3 days.    Frequent headaches, especially in the morning.    Headaches with seizures     Seek immediate medical attention if you have a headache that you would call \"the worst headache you have ever had.\"  Date Last Reviewed: 3/1/2018    0839-0977 [employer]. [address] [place] [address]. All rights reserved. This information is not intended as a substitute for professional medical care. Always follow your healthcare professional's instructions.           Patient Education     Preventing Migraine Headaches: Triggers     Red wine is a common migraine trigger.   The first step in preventing migraines is to learn what triggers them. You may then be able to control your triggers to avoid or reduce the severity of your migraines.  Know your triggers  Be aware that you may have more than one trigger, and that some triggers may work together. Common migraine triggers include:    Food and nutrition. " Skipping meals or not drinking enough water can trigger headaches. So can certain foods, such as caffeine, chocolate, artificial sweeteners, monosodium glutamate (MSG), aged cheese, or sausage.    Alcohol. Red wine and other alcoholic beverages are common migraine triggers.    Chemicals. Scents, cleaning products, gasoline, glue, perfume, and paint can be triggers. So can tobacco smoke, including secondhand smoke.    Emotions. Stress can trigger headaches or make them worse once they start.    Sleep disruption. Staying up late, sleeping late, and traveling across time zones can disrupt your sleep cycle, triggering headaches.    Hormones. Many women notice that migraines tend to happen at a certain point in their menstrual cycle. Birth control pills or hormone replacement therapy may also trigger migraines.    Environment and weather. Air travel, changes in altitude, air pressure changes, hot sun, or bright or flashing lights can be triggers.    Medicine overuse. Frequent use of pain medicines for headache pain can also cause a headache. This may also be called rebound headache.  Control your triggers  These are some of the things you can do to try to control triggers:    Avoid triggers if you can. For example, stay clear of alcohol and foods that trigger your headaches. Use unscented household products. Keep regular sleep habits. Manage stress to help control emotional triggers.    Change your behavior at times when triggers can't be avoided. For example, make sure to get enough rest and drink plenty of water while you're traveling. Make sure to carry a hat, sunglasses, and your medicines. Be alert for migraine symptoms, so you can treat a migraine early if it happens.  Date Last Reviewed: 5/1/2018 2000-2019 [employer]. [address] [facility], [place] [address]. All rights reserved. This information is not intended as a substitute for professional medical care. Always follow your healthcare  professional's instructions.

## 2020-01-31 NOTE — PROGRESS NOTES
Subjective     Jemima Augustine is a 24 year old female who presents to clinic today for the following health issues:    HPI   Migraine     Since your last clinic visit, how have your headaches changed?  Worsened    How often are you getting headaches or migraines? Monthly; up to 3 times per week    Are you able to do normal daily activities when you have a migraine? No    Are you taking rescue/relief medications? (Select all that apply) Maxalt    How helpful is your rescue/relief medication?  I get some relief    Are you taking any medications to prevent migraines? (Select all that apply)  Amitriptyline and Inderal; toradol has worked for her in the past but she has to go into urgent care every time she has a migraine.     In the past 4 weeks, how often have you gone to urgent care or the emergency room because of your headaches?  1      How many servings of fruits and vegetables do you eat daily?  0-1    On average, how many sweetened beverages do you drink each day (Examples: soda, juice, sweet tea, etc.  Do NOT count diet or artificially sweetened beverages)?   0    How many days per week do you exercise enough to make your heart beat faster? 3 or less    How many minutes a day do you exercise enough to make your heart beat faster? 9 or less    How many days per week do you miss taking your medication? 0    Medication Followup of adderall    Taking Medication as prescribed: NO    Side Effects:  None    Medication Helping Symptoms:  NO-stopped taking 1 month ago, does not like 20 mg, take 5mg couple times a week as needed     Patient would also like to discuss heart rate. 50-60 during the summer 80's now  Lifelong history of anxiety- adopted, has separation anxiety. Daily worrying. Would like to try medication for anxiety.       Patient Active Problem List   Diagnosis     ADHD (attention deficit hyperactivity disorder), inattentive type     Generalized anxiety disorder     Migraines     Overweight      "Seizure-like activity (H)     History reviewed. No pertinent surgical history.    Social History     Tobacco Use     Smoking status: Never Smoker     Smokeless tobacco: Never Used   Substance Use Topics     Alcohol use: Never     Frequency: Never     History reviewed. No pertinent family history.      Current Outpatient Medications   Medication Sig Dispense Refill     amitriptyline (ELAVIL) 25 MG tablet Take 2 tablets (50 mg) by mouth At Bedtime 60 tablet 1     amphetamine-dextroamphetamine (ADDERALL XR) 15 MG 24 hr capsule Take 1 capsule (15 mg) by mouth daily 30 capsule 0     amphetamine-dextroamphetamine (ADDERALL) 5 MG tablet Take 1 tablet (5 mg) by mouth daily as needed 30 tablet 0     ketorolac (TORADOL) 30 MG/ML injection Inject 1 mL (30 mg) into the vein as needed for moderate pain (do not use more than weekly) 4 mL 3     Needle, Disp, (BD ECLIPSE NEEDLE) 25G X 5/8\" MISC 1 Syringe daily as needed 25 each 1     propranolol (INDERAL) 60 MG tablet Take 60 mg by mouth       rizatriptan (MAXALT-MLT) 10 MG ODT Take 1 tablet (10 mg) by mouth at onset of headache for migraine May repeat in 2 hours. Max 3 tablets/24 hours. 9 tablet 1     sertraline (ZOLOFT) 50 MG tablet Take 0.5 tablets (25 mg) by mouth daily for 7 days, THEN 1 tablet (50 mg) daily for 7 days, THEN 2 tablets (100 mg) daily. 71 tablet 1     Syringe, Disposable, (SYRINGE LUER LOCK) 3 ML MISC 1 Syringe daily as needed 25 each 1     ketorolac (TORADOL) 10 MG tablet        Allergies   Allergen Reactions     Lisdexamfetamine Other (See Comments)     Recent Labs   Lab Test 07/09/19   CR 0.90   GFRESTIMATED >60   POTASSIUM 4.6      BP Readings from Last 3 Encounters:   02/04/20 118/77   01/08/20 132/76   12/06/19 118/75    Wt Readings from Last 3 Encounters:   02/04/20 68 kg (150 lb)   01/08/20 68.5 kg (151 lb)   12/06/19 68.7 kg (151 lb 6.4 oz)                    Reviewed and updated as needed this visit by Provider  Tobacco  Allergies  Meds  Problems  " "Med Hx  Surg Hx  Fam Hx         Review of Systems   ROS COMP: Constitutional, HEENT, cardiovascular, pulmonary, GI, , musculoskeletal, neuro, skin, endocrine and psych systems are negative, except as otherwise noted.      Objective    /77   Pulse 85   Temp 97.3  F (36.3  C) (Oral)   Resp 16   Ht 1.605 m (5' 3.19\")   Wt 68 kg (150 lb)   SpO2 100%   BMI 26.41 kg/m    Body mass index is 26.41 kg/m .  Physical Exam   GENERAL: healthy, alert and no distress  EYES: Eyes grossly normal to inspection, PERRL and conjunctivae and sclerae normal  RESP: lungs clear to auscultation - no rales, rhonchi or wheezes  CV: regular rate and rhythm, normal S1 S2, no S3 or S4, no murmur, click or rub, no peripheral edema and peripheral pulses strong  NEURO: Normal strength and tone, cranial nerves intact, mentation intact and speech normal  PSYCH: mentation appears normal, affect normal/bright    Diagnostic Test Results:  Labs reviewed in Epic  See orders        Assessment & Plan       ICD-10-CM    1. Intractable migraine with status migrainosus, unspecified migraine type G43.911 ketorolac (TORADOL) 30 MG/ML injection     Syringe, Disposable, (SYRINGE LUER LOCK) 3 ML MISC     Needle, Disp, (BD ECLIPSE NEEDLE) 25G X 5/8\" MISC   2. Screening for malignant neoplasm of cervix Z12.4    3. ADHD (attention deficit hyperactivity disorder), inattentive type F90.0 amphetamine-dextroamphetamine (ADDERALL XR) 15 MG 24 hr capsule     amphetamine-dextroamphetamine (ADDERALL) 5 MG tablet   4. Generalized anxiety disorder F41.1 sertraline (ZOLOFT) 50 MG tablet        BMI:   Estimated body mass index is 26.41 kg/m  as calculated from the following:    Height as of this encounter: 1.605 m (5' 3.19\").    Weight as of this encounter: 68 kg (150 lb).   Weight management plan: Discussed healthy diet and exercise guidelines        See Patient Instructions    Return in about 4 weeks (around 3/3/2020), or 4 weeks either online or in clinic for " medication check and refills. .    Marsha Hernandez, YASMEEN  [facility]

## 2020-02-04 ENCOUNTER — OFFICE VISIT (OUTPATIENT)
Dept: FAMILY MEDICINE | Facility: CLINIC | Age: 25
End: 2020-02-04
Payer: COMMERCIAL

## 2020-02-04 ENCOUNTER — TELEPHONE (OUTPATIENT)
Dept: FAMILY MEDICINE | Facility: CLINIC | Age: 25
End: 2020-02-04

## 2020-02-04 VITALS
TEMPERATURE: 97.3 F | DIASTOLIC BLOOD PRESSURE: 77 MMHG | WEIGHT: 150 LBS | SYSTOLIC BLOOD PRESSURE: 118 MMHG | OXYGEN SATURATION: 100 % | RESPIRATION RATE: 16 BRPM | HEART RATE: 85 BPM | BODY MASS INDEX: 26.58 KG/M2 | HEIGHT: 63 IN

## 2020-02-04 DIAGNOSIS — F90.0 ADHD (ATTENTION DEFICIT HYPERACTIVITY DISORDER), INATTENTIVE TYPE: ICD-10-CM

## 2020-02-04 DIAGNOSIS — G43.911 INTRACTABLE MIGRAINE WITH STATUS MIGRAINOSUS, UNSPECIFIED MIGRAINE TYPE: ICD-10-CM

## 2020-02-04 DIAGNOSIS — G43.911 INTRACTABLE MIGRAINE WITH STATUS MIGRAINOSUS, UNSPECIFIED MIGRAINE TYPE: Primary | ICD-10-CM

## 2020-02-04 DIAGNOSIS — Z12.4 SCREENING FOR MALIGNANT NEOPLASM OF CERVIX: ICD-10-CM

## 2020-02-04 DIAGNOSIS — F41.1 GENERALIZED ANXIETY DISORDER: ICD-10-CM

## 2020-02-04 PROCEDURE — 99214 OFFICE O/P EST MOD 30 MIN: CPT | Performed by: NURSE PRACTITIONER

## 2020-02-04 RX ORDER — KETOROLAC TROMETHAMINE 30 MG/ML
30 INJECTION, SOLUTION INTRAMUSCULAR; INTRAVENOUS PRN
Qty: 4 ML | Refills: 3 | Status: SHIPPED | OUTPATIENT
Start: 2020-02-04 | End: 2020-02-04

## 2020-02-04 RX ORDER — DEXTROAMPHETAMINE SACCHARATE, AMPHETAMINE ASPARTATE, DEXTROAMPHETAMINE SULFATE AND AMPHETAMINE SULFATE 1.25; 1.25; 1.25; 1.25 MG/1; MG/1; MG/1; MG/1
5 TABLET ORAL DAILY PRN
Qty: 30 TABLET | Refills: 0 | Status: SHIPPED | OUTPATIENT
Start: 2020-02-04 | End: 2020-08-19

## 2020-02-04 RX ORDER — DEXTROAMPHETAMINE SACCHARATE, AMPHETAMINE ASPARTATE MONOHYDRATE, DEXTROAMPHETAMINE SULFATE AND AMPHETAMINE SULFATE 3.75; 3.75; 3.75; 3.75 MG/1; MG/1; MG/1; MG/1
15 CAPSULE, EXTENDED RELEASE ORAL DAILY
Qty: 30 CAPSULE | Refills: 0 | Status: SHIPPED | OUTPATIENT
Start: 2020-02-04 | End: 2020-03-12

## 2020-02-04 RX ORDER — KETOROLAC TROMETHAMINE 30 MG/ML
30 INJECTION, SOLUTION INTRAMUSCULAR; INTRAVENOUS PRN
Qty: 4 ML | Refills: 3 | Status: SHIPPED | OUTPATIENT
Start: 2020-02-04 | End: 2020-05-12

## 2020-02-04 ASSESSMENT — ANXIETY QUESTIONNAIRES
7. FEELING AFRAID AS IF SOMETHING AWFUL MIGHT HAPPEN: SEVERAL DAYS
2. NOT BEING ABLE TO STOP OR CONTROL WORRYING: NEARLY EVERY DAY
5. BEING SO RESTLESS THAT IT IS HARD TO SIT STILL: NEARLY EVERY DAY
GAD7 TOTAL SCORE: 17
6. BECOMING EASILY ANNOYED OR IRRITABLE: MORE THAN HALF THE DAYS
3. WORRYING TOO MUCH ABOUT DIFFERENT THINGS: NEARLY EVERY DAY
1. FEELING NERVOUS, ANXIOUS, OR ON EDGE: MORE THAN HALF THE DAYS

## 2020-02-04 ASSESSMENT — PATIENT HEALTH QUESTIONNAIRE - PHQ9: 5. POOR APPETITE OR OVEREATING: NEARLY EVERY DAY

## 2020-02-04 ASSESSMENT — MIFFLIN-ST. JEOR: SCORE: 1412.53

## 2020-02-04 NOTE — TELEPHONE ENCOUNTER
Can you please verify route of administration for Ketorolac.   The directions say to inject into the vein, however, this is typically administered IM at home.  If IM, can you please send new RX with updated directions.    Thank You,  Sirisha Zavala, Pharm.D.  [facility]

## 2020-02-05 ASSESSMENT — ANXIETY QUESTIONNAIRES: GAD7 TOTAL SCORE: 17

## 2020-02-12 ENCOUNTER — TELEPHONE (OUTPATIENT)
Dept: FAMILY MEDICINE | Facility: CLINIC | Age: 25
End: 2020-02-12

## 2020-02-12 NOTE — TELEPHONE ENCOUNTER
Reason for Call:  Other call back    Detailed comments: patient is calling stating currently on 8th day taking RX: ZOLOFT and would like to discuss poss side effects experiencing. Patient would not discuss details just wants a call back. Thank you.    Phone Number Patient can be reached at: Home number on file [phone] (home)    Best Time:     Can we leave a detailed message on this number? YES    Call taken on 2/12/2020 at 4:18 PM by Haylee Quinonez

## 2020-02-13 NOTE — TELEPHONE ENCOUNTER
I do not see fainting listed as a potential side effect of zoloft. Marsha Hernandez, HERMES, FNP-BC

## 2020-02-13 NOTE — TELEPHONE ENCOUNTER
Spoke with patient and she stated she had two fainting episodes on Tuesday and then felt like she was having one yesterday but did not.  Patient stated she just started zoloft and wonders if this could be the cause.  Patient stopped taking the med.  Please advise.

## 2020-02-14 NOTE — TELEPHONE ENCOUNTER
Left message on voice mail for patient to call clinic. [phone]  Niyah Goodson RN

## 2020-02-14 NOTE — TELEPHONE ENCOUNTER
Spoke to patient, advised her of [place]'s information below. Advised pt to be seen in clinic for further evaluation of symptoms. Patient verbalized understanding and agrees with plan. Pt unable to make appt at this time, pt stating she will call back to schedule.    Celeste Pereyra, RN, BSN

## 2020-02-17 ENCOUNTER — OFFICE VISIT (OUTPATIENT)
Dept: NEUROLOGY | Facility: CLINIC | Age: 25
End: 2020-02-17
Attending: PSYCHIATRY & NEUROLOGY
Payer: COMMERCIAL

## 2020-02-17 VITALS — HEIGHT: 63 IN | BODY MASS INDEX: 26.61 KG/M2 | WEIGHT: 150.2 LBS

## 2020-02-17 DIAGNOSIS — R40.4 SPELL OF ALTERED CONSCIOUSNESS: Primary | ICD-10-CM

## 2020-02-17 DIAGNOSIS — Z79.899 ENCOUNTER FOR LONG-TERM (CURRENT) USE OF MEDICATIONS: ICD-10-CM

## 2020-02-17 DIAGNOSIS — G43.009 MIGRAINE WITHOUT AURA AND WITHOUT STATUS MIGRAINOSUS, NOT INTRACTABLE: ICD-10-CM

## 2020-02-17 ASSESSMENT — ENCOUNTER SYMPTOMS
WEIGHT GAIN: 0
DECREASED APPETITE: 0
PALPITATIONS: 0
NERVOUS/ANXIOUS: 1
LEG PAIN: 0
HEADACHES: 1
DEPRESSION: 0
SLEEP DISTURBANCES DUE TO BREATHING: 0
DIZZINESS: 0
SYNCOPE: 1
ALTERED TEMPERATURE REGULATION: 1
PANIC: 1
NIGHT SWEATS: 0
DECREASED CONCENTRATION: 1
MEMORY LOSS: 0
ORTHOPNEA: 0
DISTURBANCES IN COORDINATION: 0
SPEECH CHANGE: 0
INCREASED ENERGY: 0
FATIGUE: 1
CHILLS: 0
HALLUCINATIONS: 0
FEVER: 0
HYPOTENSION: 0
LOSS OF CONSCIOUSNESS: 1
HYPERTENSION: 0
INSOMNIA: 0
POLYDIPSIA: 0
LIGHT-HEADEDNESS: 1
SEIZURES: 0

## 2020-02-17 ASSESSMENT — PAIN SCALES - GENERAL: PAINLEVEL: NO PAIN (0)

## 2020-02-17 ASSESSMENT — HEADACHE IMPACT TEST (HIT 6)
HOW OFTEN HAVE YOU FELT TOO TIRED TO WORK BECAUSE OF YOUR HEADACHES: SOMETIMES
HOW OFTEN DID HEADACHS LIMIT CONCENTRATION ON WORK OR DAILY ACTIVITY: VERY OFTEN
HOW OFTEN DO HEADACHES LIMIT YOUR DAILY ACTIVITIES: SOMETIMES
WHEN YOU HAVE A HEADACHE HOW OFTEN DO YOU WISH YOU COULD LIE DOWN: ALWAYS
HIT6 TOTAL SCORE: 66
WHEN YOU HAVE HEADACHES HOW OFTEN IS THE PAIN SEVERE: VERY OFTEN
HOW OFTEN HAVE YOU FELT FED UP OR IRRITATED BECAUSE OF YOUR HEADACHES: VERY OFTEN

## 2020-02-17 ASSESSMENT — MIFFLIN-ST. JEOR: SCORE: 1410.43

## 2020-02-17 NOTE — NURSING NOTE
Chief Complaint   Patient presents with     New Patient     New Headache - Migraine w/o aura.

## 2020-02-17 NOTE — LETTER
"2/17/2020       RE: Jemima Augustine  [address]  [facility] [address]     Dear Colleague,    Thank you for referring your patient, Jemima Augustine, to the [facility] NEUROLOGY at [facility]. Please see a copy of my visit note below.    Re: Jemima Augustine      MRN# [id number]  YOB: 1995  Date of Visit:2/17/2020    OUTPATIENT NEUROLOGY VISIT NOTE    Chief Complaint:  Headache evaluation    History of Present Illness  Jemima Augustine is a 24-year-old female presents to the clinic today for headache evaluation    Headache History:    Onset History: \"all my life\" and severe in HS and was on propranolol. Patient is adopted from [place] and no aware of her biological family history.     Current Headache Pattern:      Frequency (How many headache days per month?): migraine headaches are rare -\"may be 1-2 every year and really does not get them often anymore\" since starting propranolol+amitriptyline 3 years ago (used to get a couple per month). Currently she has \"headaches\" and feels like a \"rubber band\" and \"shooting pain in the suboccipital area with moving her head and frequency at least a couple times per month (worse in the summer) and now less since December 2019 (after last toradol shot) and before that she was getting toradol injections ([facility] ) every other week  Denies daily headaches   Duration of Headache:  Day-week     Aura: none     Associated Symptoms:  nausea, light sensitivity, sound sensitivity and vision changes blurry -all of the symptoms with migraine headaches but not with current headaches       Description of Headache Pain & Location:  A rubber band around her haehad and feeling of tightness and sometimes sharp pain behind right ear and worse with head movement, 1-8/10 on the numeric pain scale  Do headaches interfere with or prevent usual activities or diminish your productivity at home or work?  Missed " "work a few times in the past 6 month     Treatments Tried:    Propranolol for many years at 60 mg ER and was on 100 mg and caused syncope  Amitriptyline 25 mg at bedtime and tried to increase it to 50 mg and was causing \"faintings\"  Sumatriptan -helped in the past but stopped working  Rizatriptan -new med and helped when tried it  lamictal -caused twitching     Have you needed to utilize the Emergency Room to treat your headache symptoms?  Urgent care for toradol injections  Are Headaches worsening over time?  Improved in the past  2 months     What makes your headaches better?  Better with sleep     What makes your headaches worse or triggers your headaches? Not getting enough sleep     Has an IUD Pennie   Sleeping is good   Getting  in June 27  No caffeine and may be rarely but none daily  Alcohol none    Denies history of head or neck trauma, double vision, blurred vision, hearing difficulty, speech or swallowing difficulty, weakness or numbness in face, arms or legs, urinary or bowel incontinence, coordination problems or gait difficulty, fever or chills.    MRI and MRA brain at [facility] and report of the images and outside neurology note  reviewed in Epic. Patient was recommended anticonvulsant therapy but she has declined. Patient reports that \"fainting\" last Monday and reports that her \"ears felt hot\" and lasted for a minute and witnessed by her fiance. Patient reports that she does not know why \"did happen\" and just went bed. Denies biting tongue or urine incontinence.       Past Medical History reviewed and verified with the patient  Migraine headaches   Fainting spells   Was diagnosed with generalized seizure disorder at [facility]    Past Surgical History reviewed and verified with the patient  Right foot surgery   Family History reviewed and verified with the patient  adopted  Social History:  6-grade  at [employer] in [place], grew up in [place], [place], grad [employer], Engaged in May of 2019 and wedding June 27th of 2020  Social History     Tobacco Use     Smoking status: Never Smoker     Smokeless tobacco: Never Used   Substance Use Topics     Alcohol use: Never     Frequency: Never    reviewed and verified with the patient     Allergies   Allergen Reactions     Lisdexamfetamine Other (See Comments)       Current Outpatient Medications   Medication Sig Dispense Refill     amitriptyline (ELAVIL) 25 MG tablet Take 25 mg by mouth At Bedtime 60 tablet 1     amphetamine-dextroamphetamine (ADDERALL XR) 15 MG 24 hr capsule Take 1 capsule (15 mg) by mouth daily 30 capsule 0     amphetamine-dextroamphetamine (ADDERALL) 5 MG tablet Take 1 tablet (5 mg) by mouth daily as needed 30 tablet 0                     propranolol (INDERAL) 60 MG tablet Take 60 mg by mouth       rizatriptan (MAXALT-MLT) 10 MG ODT Take 1 tablet (10 mg) by mouth at onset of headache for migraine May repeat in 2 hours. Max 3 tablets/24 hours. 9 tablet 1     sertraline (ZOLOFT) 50 MG tablet Take 0.5 tablets (25 mg) by mouth daily for 7 days, THEN 1 tablet (50 mg) daily for 7 days, THEN 2 tablets (100 mg) daily. 71 tablet 1           reviewed and verified with the patient     General Exam:   Ht 1.6 m (5' 3\")   Wt 68.1 kg (150 lb 3.2 oz)   BMI 26.61 kg/m      BP (P) 112/72 (BP Location: Right arm, Patient Position: Chair, Cuff Size: Adult Regular)   Pulse (P) 98   Ht 1.6 m (5' 3\")   Wt 68.1 kg (150 lb 3.2 oz)   SpO2 (P) 97%   BMI 26.61 kg/m      Sitting /61 HR 80   Standing /71 HR 93  GEN: Awake, NAD; good eye contact, responses appropriately, no headache today    HEENT: Head atraumatic/Normocephalic. Scalp normal. Pupils equally round, 4 mm, reactive to light and accommodation, sclera and conjunctiva normal. Fundoscopic examination reveals normal vessels no papilledema.   Neck: Easily moveable without resistance  Heart: S1/S2 appreciated, RRR, no m/r/g, no carotid bruits  Lungs:Lungs are clear " to auscultation bilaterally, no wheezes or crackles.   Neurological Examination:  The patient is alert and oriented times four. Has good attention and concentration. Speech is fluent without dysarthria. EOM intact. There is no nystagmus. Has conjugated gaze. Face is symmetrical. Intact and symmetrical eyebrow and lid raise and eyelid closure, smiles. Hearing Intact to conversation speech. The palates elevates symmetrical. The tongue protrudes midline with no atrophy or fasciculations. Strength  5/5 in the upper and lower extremities bilaterally. Sensation is intact to  touch throughout.  Reflexes 2+symmetrical at biceps, triceps, brachioradialis, patellar, and Achilles. Negative Babinski with downgoing toes bilaterally. Coordination reveals finger-nose-finger, rapid alternating movements with normal speed and accuracy. Normal casual gait.  Assessment and Plan:  Headaches improved since December of 2019. Migraine headaches are rare and about 2 irma per year and tension headaches were more frequent last summer and fall and improved in the past 2 month to a few and used rizatriptan once and ibuprofen a several times.   Non focal neurological exam today.   Patient has taking propranolol for more than 3 years and amitriptyline for about 3 years. Patient reports fainting spells about a couple times per month-reports when she tried to increase amitriptyline she fainted more.   Patient has been seen at [facility] and was diagnosed with generalized seizures and recommended antiepileptics but declined. Patient is opened to get a second opinion about her fainting spells and seizures.     Plan discussed:  EKG for amitriptyline and propranolol possible side effects  Patient would like to wait with preventive treatment changes for now and opened to record her headaches for frequency and severity.   Rizatriptan as needed for acute migraine treatment and limit use to no more than 9 days per month  Vitamin B2  (riboflavin) 200 mg daily OTC for headache prevention  Stay hydrated  Keep sleep routine  Follow up in in April to review your headache diary and need for treatment changes or sooner if needed    Spells, epileptic vs non epileptic. Patient was seen at [facility] and is opened to obtain a second opinion.     I discussed all my recommendation with Jemima Augustine. The patient verbalizes understanding and comfortable with the plan. The patient has our clinic phone number to call with any questions or concerns. All of the patient's questions were answered from the best of my current knowledge.     Thank you for letting me be a part of the treatment team for Jemima Augustine    Time spent with pt answering questions, discussing findings, counseling and coordinating care was more than 50% the appointment time, 60 minutes.     HERMES Gonzalze, CNP  [facility]

## 2020-02-17 NOTE — PATIENT INSTRUCTIONS
Plan:  EKG for amitriptyline and propranolol possible side effects  Patient would like to wait with preventive treatment changes for now and opened to record her headaches for frequency and severity.   Rizatriptan as needed for acute migraine treatment and limit use to no more than 9 days per month  Vitamin B2 (riboflavin) 200 mg daily OTC for headache prevention  Stay hydrated  Keep sleep routine  Follow up in in April to review your headache diary and need for treatment changes or sooner if needed    Spells, referral for a second opinion.

## 2020-02-17 NOTE — PROGRESS NOTES
"Re: Jemima Augustine      MRN# [id number]  YOB: 1995  Date of Visit:2/17/2020    OUTPATIENT NEUROLOGY VISIT NOTE    Chief Complaint:  Headache evaluation    History of Present Illness  Jemima Augustine is a 24-year-old female presents to the clinic today for headache evaluation    Headache History:    Onset History: \"all my life\" and severe in HS and was on propranolol. Patient is adopted from [place] and no aware of her biological family history.     Current Headache Pattern:      Frequency (How many headache days per month?): migraine headaches are rare -\"may be 1-2 every year and really does not get them often anymore\" since starting propranolol+amitriptyline 3 years ago (used to get a couple per month). Currently she has \"headaches\" and feels like a \"rubber band\" and \"shooting pain in the suboccipital area with moving her head and frequency at least a couple times per month (worse in the summer) and now less since December 2019 (after last toradol shot) and before that she was getting toradol injections ([facility] ) every other week  Denies daily headaches   Duration of Headache:  Day-week     Aura: none     Associated Symptoms:  nausea, light sensitivity, sound sensitivity and vision changes blurry -all of the symptoms with migraine headaches but not with current headaches       Description of Headache Pain & Location:  A rubber band around her haehad and feeling of tightness and sometimes sharp pain behind right ear and worse with head movement, 1-8/10 on the numeric pain scale  Do headaches interfere with or prevent usual activities or diminish your productivity at home or work?  Missed work a few times in the past 6 month       Treatments Tried:    Propranolol for many years at 60 mg ER and was on 100 mg and caused syncope  Amitriptyline 25 mg at bedtime and tried to increase it to 50 mg and was causing \"faintings\"  Sumatriptan -helped in the past but stopped working  Rizatriptan -new " "med and helped when tried it  lamictal -caused twitching     Have you needed to utilize the Emergency Room to treat your headache symptoms?  Urgent care for toradol injections  Are Headaches worsening over time?  Improved in the past  2 months     What makes your headaches better?  Better with sleep     What makes your headaches worse or triggers your headaches? Not getting enough sleep     Has an IUD Pennie   Sleeping is good   Getting  in June 27  No caffeine and may be rarely but none daily  Alcohol none    Denies history of head or neck trauma, double vision, blurred vision, hearing difficulty, speech or swallowing difficulty, weakness or numbness in face, arms or legs, urinary or bowel incontinence, coordination problems or gait difficulty, fever or chills.    MRI and MRA brain at [facility] and report of the images and outside neurology note  reviewed in Epic. Patient was recommended anticonvulsant therapy but she has declined. Patient reports that \"fainting\" last Monday and reports that her \"ears felt hot\" and lasted for a minute and witnessed by her fiance. Patient reports that she does not know why \"did happen\" and just went bed. Denies biting tongue or urine incontinence.       Past Medical History reviewed and verified with the patient  Migraine headaches   Fainting spells   Was diagnosed with generalized seizure disorder at [facility]    Past Surgical History reviewed and verified with the patient  Right foot surgery   Family History reviewed and verified with the patient  adopted  Social History:  6-grade  at [employer] in [place], grew up in [place], [place], [employer] [facility], Engaged in May of 2019 and wedding June 27th of 2020  Social History     Tobacco Use     Smoking status: Never Smoker     Smokeless tobacco: Never Used   Substance Use Topics     Alcohol use: Never     Frequency: Never    reviewed and verified with the patient     Allergies   Allergen Reactions "     Lisdexamfetamine Other (See Comments)       Current Outpatient Medications   Medication Sig Dispense Refill     amitriptyline (ELAVIL) 25 MG tablet Take 25 mg by mouth At Bedtime 60 tablet 1     amphetamine-dextroamphetamine (ADDERALL XR) 15 MG 24 hr capsule Take 1 capsule (15 mg) by mouth daily 30 capsule 0     amphetamine-dextroamphetamine (ADDERALL) 5 MG tablet Take 1 tablet (5 mg) by mouth daily as needed 30 tablet 0                     propranolol (INDERAL) 60 MG tablet Take 60 mg by mouth       rizatriptan (MAXALT-MLT) 10 MG ODT Take 1 tablet (10 mg) by mouth at onset of headache for migraine May repeat in 2 hours. Max 3 tablets/24 hours. 9 tablet 1     sertraline (ZOLOFT) 50 MG tablet Take 0.5 tablets (25 mg) by mouth daily for 7 days, THEN 1 tablet (50 mg) daily for 7 days, THEN 2 tablets (100 mg) daily. 71 tablet 1           reviewed and verified with the patient    Review of Systems:   A 10-point ROS including constitutional, eyes, respiratory, cardiovascular, gastroenterology, genitourinary, integumentary, musculoskeletal, neurology and psychiatric were all negative except as mentioned in the interval history.   Answers for HPI/ROS submitted by the patient on 2/17/2020   General Symptoms: Yes  Skin Symptoms: No  HENT Symptoms: No  EYE SYMPTOMS: No  HEART SYMPTOMS: Yes  LUNG SYMPTOMS: No  INTESTINAL SYMPTOMS: No  URINARY SYMPTOMS: No  GYNECOLOGIC SYMPTOMS: No  BREAST SYMPTOMS: No  SKELETAL SYMPTOMS: No  BLOOD SYMPTOMS: No  NERVOUS SYSTEM SYMPTOMS: Yes  MENTAL HEALTH SYMPTOMS: Yes  Fever: No  Loss of appetite: No  Weight gain: No  Fatigue: Yes  Night sweats: No  Chills: No  Excessive thirst: No  Feeling hot or cold when others believe the temperature is normal: Yes  Loss of height: No  Post-operative complications: No  Surgical site pain: No  Hallucinations: No  Change in or Loss of Energy: No  Chest pain or pressure: No  Fast or irregular heartbeat: No  Pain in legs with walking: No  Trouble breathing  "while lying down: No  Fingers or toes appear blue: No  High blood pressure: No  Low blood pressure: No  Fainting: Yes  Murmurs: No  Pacemaker: No  Varicose veins: No  Edema or swelling: No  Wake up at night with shortness of breath: No  Light-headedness: Yes  Trouble with coordination: No  Dizziness or trouble with balance: No  Fainting or black-out spells: Yes  Memory loss: No  Headache: Yes  Seizures: No  Speech problems: No  Nervous or Anxious: Yes  Depression: No  Trouble sleeping: No  Trouble thinking or concentrating: Yes  Mood changes: Yes  Panic attacks: Yes     General Exam:   Ht 1.6 m (5' 3\")   Wt 68.1 kg (150 lb 3.2 oz)   BMI 26.61 kg/m     BP (P) 112/72 (BP Location: Right arm, Patient Position: Chair, Cuff Size: Adult Regular)   Pulse (P) 98   Ht 1.6 m (5' 3\")   Wt 68.1 kg (150 lb 3.2 oz)   SpO2 (P) 97%   BMI 26.61 kg/m     Sitting /61 HR 80   Standing /71 HR 93  GEN: Awake, NAD; good eye contact, responses appropriately, no headache today    HEENT: Head atraumatic/Normocephalic. Scalp normal. Pupils equally round, 4 mm, reactive to light and accommodation, sclera and conjunctiva normal. Fundoscopic examination reveals normal vessels no papilledema.   Neck: Easily moveable without resistance  Heart: S1/S2 appreciated, RRR, no m/r/g, no carotid bruits  Lungs:Lungs are clear to auscultation bilaterally, no wheezes or crackles.   Neurological Examination:  The patient is alert and oriented times four. Has good attention and concentration. Speech is fluent without dysarthria. EOM intact. There is no nystagmus. Has conjugated gaze. Face is symmetrical. Intact and symmetrical eyebrow and lid raise and eyelid closure, smiles. Hearing Intact to conversation speech. The palates elevates symmetrical. The tongue protrudes midline with no atrophy or fasciculations. Strength  5/5 in the upper and lower extremities bilaterally. Sensation is intact to  touch throughout.  Reflexes 2+symmetrical at " biceps, triceps, brachioradialis, patellar, and Achilles. Negative Babinski with downgoing toes bilaterally. Coordination reveals finger-nose-finger, rapid alternating movements with normal speed and accuracy. Normal casual gait.  Assessment and Plan:  Headaches improved since December of 2019. Migraine headaches are rare and about 2 irma per year and tension headaches were more frequent last summer and fall and improved in the past 2 month to a few and used rizatriptan once and ibuprofen a several times.   Non focal neurological exam today.   Patient has taking propranolol for more than 3 years and amitriptyline for about 3 years. Patient reports fainting spells about a couple times per month-reports when she tried to increase amitriptyline she fainted more.   Patient has been seen at [facility] and was diagnosed with generalized seizures and recommended antiepileptics but declined. Patient is opened to get a second opinion about her fainting spells and seizures.     Plan discussed:  EKG for amitriptyline and propranolol possible side effects  Patient would like to wait with preventive treatment changes for now and opened to record her headaches for frequency and severity.   Rizatriptan as needed for acute migraine treatment and limit use to no more than 9 days per month  Vitamin B2 (riboflavin) 200 mg daily OTC for headache prevention  Stay hydrated  Keep sleep routine  Follow up in in April to review your headache diary and need for treatment changes or sooner if needed    Spells, epileptic vs non epileptic. Patient was seen at [facility] and is opened to obtain a second opinion.       I discussed all my recommendation with Jemima Augustine. The patient verbalizes understanding and comfortable with the plan. The patient has our clinic phone number to call with any questions or concerns. All of the patient's questions were answered from the best of my current knowledge.      Thank you for letting me be a part of the treatment team for Jemima Augustine      Time spent with pt answering questions, discussing findings, counseling and coordinating care was more than 50% the appointment time, 60 minutes.         HERMES Gonzalez, [facility]

## 2020-02-18 NOTE — TELEPHONE ENCOUNTER
RECORDS RECEIVED FROM: Internal   Date of Appt: 3/17/20   NOTES (FOR ALL VISITS) STATUS DETAILS   OFFICE NOTE from referring provider Internal Anne Nava NP @ [facility]:  2/17/20   OFFICE NOTE from other specialist Received Dr Talisha Hinojosa @ [facility]:  12/13/18  10/31/18   DISCHARGE SUMMARY from hospital N/A    DISCHARGE REPORT from the ER N/A    OPERATIVE REPORT N/A    MEDICATION LIST Internal    IMAGING  (FOR ALL VISITS)     EMG N/A    EEG N/A    MRI (HEAD, NECK, SPINE) Received  [facility]:  MRA Head 11/7/18  MRI Brain 11/7/18   LUMBAR PUNCTURE N/A    CLARISSE Scan N/A    CT (HEAD, NECK, SPINE) N/A       Action 2/18/20 MV 12.11pm   Action Taken Imaging request faxed to [facility] for:  MRA Head 11/7/18  MRI Brain 11/7/18     Imaging Received  2/25/20 MV 9.23am  [facility]   Image Type (x): Disc: x  PACS:    Exam Date/Name MRA Head 11/7/18  MRI Brain 11/7/18 Comments: imaging disk received via mail. Sent to PACS upload

## 2020-02-19 ENCOUNTER — TELEPHONE (OUTPATIENT)
Dept: FAMILY MEDICINE | Facility: CLINIC | Age: 25
End: 2020-02-19

## 2020-02-19 NOTE — TELEPHONE ENCOUNTER
It seems like someone else prescribed toradol injections-should clarify it with the prescriber.   We can discuss it in April. Please let the patient know  Thank you

## 2020-02-19 NOTE — TELEPHONE ENCOUNTER
Patient was seen on 2/17/20 by Anne MIRZA CNP in Neurology.     The progress note does not talk about the Toradol IM prn for moderate pain being part of her plan.     Patient states that she forgot to ask about this medication during her recent neurology visit.     Jemima is instructed to call Neurology to get clarification on the Toradol. She verbalized a good understanding and will call back once she has done so.     Patient is scheduled with an RN at the [facility] to be instructed in Toradol self injection on 2/21/20. Jemima has already picked up the Toradol and states that she has the syringes and needles also.     We will wait to hear back from patient for the direction.    Izzy Oviedo RN BSN

## 2020-02-21 ENCOUNTER — ALLIED HEALTH/NURSE VISIT (OUTPATIENT)
Dept: NURSING | Facility: CLINIC | Age: 25
End: 2020-02-21
Payer: COMMERCIAL

## 2020-02-21 DIAGNOSIS — Z71.89 ENCOUNTER FOR INJECTION EDUCATION: Primary | ICD-10-CM

## 2020-02-21 PROCEDURE — 99207 ZZC NO CHARGE NURSE ONLY: CPT

## 2020-02-21 NOTE — NURSING NOTE
Under the order of Marsha LUCAS, Intramuscular injection teaching completed with patient. She has been prescribed Ketoralac 30 mg/1 ml  IM prn for moderate pain. She is advised to use this medication no more than once a week.     Jemima observed me drawing up 1 ml of sterile water and injecting into practice pillow. She then demonstrated drawing up 1 ml of sterile water and also injected into the practice pillow. She is able to identify the appropriate site for a thigh IM injection. She states that her fiance is a diabetic and she has access to a sharps disposal container at home.     Patient given the following printed handouts:  * [employer] Sharps Disposal  * Discharge Instructions: Giving yourself an IM Injection  * Discharge Instructions: Giving yourself an IM Injection in the thigh.    Jemima verbalized a good understanding of the above techniques and information.     Follow up visit scheduled with Marsha LUCAS on 3/3/20.     Izzy Oviedo RN BSN

## 2020-03-02 ENCOUNTER — TELEPHONE (OUTPATIENT)
Dept: NEUROLOGY | Facility: CLINIC | Age: 25
End: 2020-03-02

## 2020-03-02 NOTE — TELEPHONE ENCOUNTER
[facility] Call Center    Phone Message    May a detailed message be left on voicemail: yes     Reason for Call: Other: Pt scheduled appts for Anne Nava and Dr. Diaz, she'd like to know if she can get EKG done one of those days or if it needsto be a different day. Please call back.      Action Taken: Message routed to:  [facility] Neuro    Travel Screening: Not Applicable

## 2020-03-03 ENCOUNTER — TELEPHONE (OUTPATIENT)
Dept: NEUROLOGY | Facility: CLINIC | Age: 25
End: 2020-03-03

## 2020-03-03 NOTE — TELEPHONE ENCOUNTER
I gave Jemima a call in regards to phone message. I left a voicemail that she can do her EKG before her appointment on 4/14 with Dr. Diaz if she likes. I let her know to please have done prior to then as it will be helpful for Dr. Diaz to have. I also let her know I can fax the EKG order to her local clinic if that is easier for her.     I asked she call back if she needs further assistance.     Tiffany EASTON

## 2020-03-03 NOTE — TELEPHONE ENCOUNTER
I left pt a voicemail letting her know if she would like the schedule her EKG here at the [facility] she can call [phone] and ask to speak to scheduling.     If she would like to schedule at a different clinic and needs me to fax the order there she can call and ask for me so I know which clinic to send the order.     Tiffany EASTON

## 2020-03-03 NOTE — TELEPHONE ENCOUNTER
[facility] Call [facility]    Phone Message    May a detailed message be left on voicemail: yes     Reason for Call: Other: pt returned call to Tiffany Cr pt stated she needs further assistance in scheduling the EKG     Action Taken: Message routed to:  [facility] ([facility] neuro    Travel Screening: Not Applicable

## 2020-03-09 NOTE — PROGRESS NOTES
Subjective     Jemima Augustine is a 24 year old female who presents to clinic today for the following health issues:    HPI   Anxiety Follow-Up    How are you doing with your anxiety since your last visit? Improved but still worries about things she cannot control    Are you having other symptoms that might be associated with anxiety? No    Have you had a significant life event? No     Are you feeling depressed? No    Do you have any concerns with your use of alcohol or other drugs? No    Social History     Tobacco Use     Smoking status: Never Smoker     Smokeless tobacco: Never Used   Substance Use Topics     Alcohol use: Never     Frequency: Never     Drug use: Never     BEBO-7 SCORE 2/4/2020 3/12/2020   Total Score 17 10     PHQ 2/4/2020 3/12/2020   PHQ-9 Total Score - 4   Q9: Thoughts of better off dead/self-harm past 2 weeks Not at all Not at all     Last PHQ-9 3/12/2020   1.  Little interest or pleasure in doing things 0   2.  Feeling down, depressed, or hopeless 0   3.  Trouble falling or staying asleep, or sleeping too much 2   4.  Feeling tired or having little energy 1   5.  Poor appetite or overeating 0   6.  Feeling bad about yourself 0   7.  Trouble concentrating 1   8.  Moving slowly or restless 0   Q9: Thoughts of better off dead/self-harm past 2 weeks 0   PHQ-9 Total Score 4   Difficulty at work, home, or with people Somewhat difficult     BEBO-7  3/12/2020   1. Feeling nervous, anxious, or on edge 1   2. Not being able to stop or control worrying 2   3. Worrying too much about different things 2   4. Trouble relaxing 1   5. Being so restless that it is hard to sit still 2   6. Becoming easily annoyed or irritable 1   7. Feeling afraid, as if something awful might happen 1   BEBO-7 Total Score 10   If you checked any problems, how difficult have they made it for you to do your work, take care of things at home, or get along with other people? Somewhat difficult         How many servings of  fruits and vegetables do you eat daily?  0-1    On average, how many sweetened beverages do you drink each day (Examples: soda, juice, sweet tea, etc.  Do NOT count diet or artificially sweetened beverages)?   0    How many days per week do you exercise enough to make your heart beat faster? 3 or less    How many minutes a day do you exercise enough to make your heart beat faster? 60 or more    How many days per week do you miss taking your medication? 0    Medication Followup of Migraine    Taking Medication as prescribed: yes    Side Effects:  None    Medication Helping Symptoms:  Having a lot of headaches, but not necessarily migraines    Just saw neurology at [facility], was told to keep a headache diary.  Has follow up visit for April.      Medication Followup of ADHD    Taking Medication as prescribed: yes    Side Effects:  None    Medication Helping Symptoms:  Yes, doesn't take the 5mg that often       Patient Active Problem List   Diagnosis     ADHD (attention deficit hyperactivity disorder), inattentive type     Generalized anxiety disorder     Migraines     Overweight     Seizure-like activity (H)     History reviewed. No pertinent surgical history.    Social History     Tobacco Use     Smoking status: Never Smoker     Smokeless tobacco: Never Used   Substance Use Topics     Alcohol use: Never     Frequency: Never     History reviewed. No pertinent family history.      Current Outpatient Medications   Medication Sig Dispense Refill     amitriptyline (ELAVIL) 25 MG tablet Take 2 tablets (50 mg) by mouth At Bedtime 60 tablet 1     amphetamine-dextroamphetamine (ADDERALL XR) 15 MG 24 hr capsule Take 1 capsule (15 mg) by mouth daily 30 capsule 0     [START ON 4/12/2020] amphetamine-dextroamphetamine (ADDERALL XR) 15 MG 24 hr capsule Take 1 capsule (15 mg) by mouth daily 30 capsule 0     [START ON 5/13/2020] amphetamine-dextroamphetamine (ADDERALL XR) 15 MG 24 hr capsule Take 1 capsule (15 mg) by mouth daily 30  "capsule 0     amphetamine-dextroamphetamine (ADDERALL) 5 MG tablet Take 1 tablet (5 mg) by mouth daily as needed 30 tablet 0     ketorolac (TORADOL) 30 MG/ML injection Inject 1 mL (30 mg) into the muscle as needed for moderate pain (do not use more than weekly) 4 mL 3     propranolol (INDERAL) 60 MG tablet Take 60 mg by mouth       rizatriptan (MAXALT-MLT) 10 MG ODT Take 1 tablet (10 mg) by mouth at onset of headache for migraine May repeat in 2 hours. Max 3 tablets/24 hours. 9 tablet 1     sertraline (ZOLOFT) 100 MG tablet Take 2 tablets (200 mg) by mouth daily 180 tablet 1     Needle, Disp, (BD ECLIPSE NEEDLE) 25G X 5/8\" MISC 1 Syringe daily as needed 25 each 1     Syringe, Disposable, (SYRINGE LUER LOCK) 3 ML MISC 1 Syringe daily as needed 25 each 1     Allergies   Allergen Reactions     Lisdexamfetamine Other (See Comments)     Recent Labs   Lab Test 07/09/19   CR 0.90   GFRESTIMATED >60   POTASSIUM 4.6      BP Readings from Last 3 Encounters:   03/12/20 116/75   02/17/20 (P) 112/72   02/04/20 118/77    Wt Readings from Last 3 Encounters:   03/12/20 67.6 kg (149 lb)   02/17/20 68.1 kg (150 lb 3.2 oz)   02/04/20 68 kg (150 lb)                    Reviewed and updated as needed this visit by Provider  Tobacco  Allergies  Meds  Problems  Med Hx  Surg Hx  Fam Hx         Review of Systems   ROS COMP: Constitutional, HEENT, cardiovascular, pulmonary, GI, , musculoskeletal, neuro, skin, endocrine and psych systems are negative, except as otherwise noted.      Objective    /75   Pulse 91   Temp 98.3  F (36.8  C) (Tympanic)   Resp 16   Wt 67.6 kg (149 lb)   SpO2 100%   Breastfeeding No   BMI 26.39 kg/m    Body mass index is 26.39 kg/m .     Physical Exam   GENERAL: healthy, alert and no distress  EYES: Eyes grossly normal to inspection, PERRL and conjunctivae and sclerae normal  RESP: lungs clear to auscultation - no rales, rhonchi or wheezes  CV: regular rate and rhythm, normal S1 S2, no S3 or S4, " no murmur, click or rub, no peripheral edema and peripheral pulses strong  NEURO: Normal strength and tone, cranial nerves intact, speech normal  PSYCH: mentation appears normal, affect normal/bright    Diagnostic Test Results:  Labs reviewed in Epic  See orders        Assessment & Plan       ICD-10-CM    1. ADHD (attention deficit hyperactivity disorder), inattentive type  F90.0 amphetamine-dextroamphetamine (ADDERALL XR) 15 MG 24 hr capsule     amphetamine-dextroamphetamine (ADDERALL XR) 15 MG 24 hr capsule     amphetamine-dextroamphetamine (ADDERALL XR) 15 MG 24 hr capsule   2. Generalized anxiety disorder  F41.1 sertraline (ZOLOFT) 100 MG tablet   3. Intractable migraine with status migrainosus, unspecified migraine type  G43.911           See Patient Instructions    Return in about 6 months (around 9/12/2020), or if symptoms worsen or fail to improve.    Marsha Hernandez, YASMEEN  [facility]

## 2020-03-10 DIAGNOSIS — G43.009 MIGRAINE WITHOUT AURA AND WITHOUT STATUS MIGRAINOSUS, NOT INTRACTABLE: ICD-10-CM

## 2020-03-10 DIAGNOSIS — Z79.899 ENCOUNTER FOR LONG-TERM (CURRENT) USE OF MEDICATIONS: ICD-10-CM

## 2020-03-10 DIAGNOSIS — R40.4 SPELL OF ALTERED CONSCIOUSNESS: ICD-10-CM

## 2020-03-11 ENCOUNTER — HEALTH MAINTENANCE LETTER (OUTPATIENT)
Age: 25
End: 2020-03-11

## 2020-03-12 ENCOUNTER — OFFICE VISIT (OUTPATIENT)
Dept: FAMILY MEDICINE | Facility: CLINIC | Age: 25
End: 2020-03-12
Payer: COMMERCIAL

## 2020-03-12 VITALS
BODY MASS INDEX: 26.39 KG/M2 | OXYGEN SATURATION: 100 % | WEIGHT: 149 LBS | HEART RATE: 91 BPM | TEMPERATURE: 98.3 F | SYSTOLIC BLOOD PRESSURE: 116 MMHG | DIASTOLIC BLOOD PRESSURE: 75 MMHG | RESPIRATION RATE: 16 BRPM

## 2020-03-12 DIAGNOSIS — F90.0 ADHD (ATTENTION DEFICIT HYPERACTIVITY DISORDER), INATTENTIVE TYPE: Primary | ICD-10-CM

## 2020-03-12 DIAGNOSIS — F41.1 GENERALIZED ANXIETY DISORDER: ICD-10-CM

## 2020-03-12 DIAGNOSIS — G43.911 INTRACTABLE MIGRAINE WITH STATUS MIGRAINOSUS, UNSPECIFIED MIGRAINE TYPE: ICD-10-CM

## 2020-03-12 PROCEDURE — 99214 OFFICE O/P EST MOD 30 MIN: CPT | Performed by: NURSE PRACTITIONER

## 2020-03-12 RX ORDER — DEXTROAMPHETAMINE SACCHARATE, AMPHETAMINE ASPARTATE MONOHYDRATE, DEXTROAMPHETAMINE SULFATE AND AMPHETAMINE SULFATE 3.75; 3.75; 3.75; 3.75 MG/1; MG/1; MG/1; MG/1
15 CAPSULE, EXTENDED RELEASE ORAL DAILY
Qty: 30 CAPSULE | Refills: 0 | Status: SHIPPED | OUTPATIENT
Start: 2020-03-12 | End: 2020-04-11

## 2020-03-12 RX ORDER — DEXTROAMPHETAMINE SACCHARATE, AMPHETAMINE ASPARTATE MONOHYDRATE, DEXTROAMPHETAMINE SULFATE AND AMPHETAMINE SULFATE 3.75; 3.75; 3.75; 3.75 MG/1; MG/1; MG/1; MG/1
15 CAPSULE, EXTENDED RELEASE ORAL DAILY
Qty: 30 CAPSULE | Refills: 0 | Status: SHIPPED | OUTPATIENT
Start: 2020-05-13 | End: 2020-05-12

## 2020-03-12 RX ORDER — DEXTROAMPHETAMINE SACCHARATE, AMPHETAMINE ASPARTATE MONOHYDRATE, DEXTROAMPHETAMINE SULFATE AND AMPHETAMINE SULFATE 3.75; 3.75; 3.75; 3.75 MG/1; MG/1; MG/1; MG/1
15 CAPSULE, EXTENDED RELEASE ORAL DAILY
Qty: 30 CAPSULE | Refills: 0 | Status: SHIPPED | OUTPATIENT
Start: 2020-04-12 | End: 2020-05-12

## 2020-03-12 RX ORDER — SERTRALINE HYDROCHLORIDE 100 MG/1
200 TABLET, FILM COATED ORAL DAILY
Qty: 180 TABLET | Refills: 1 | Status: SHIPPED | OUTPATIENT
Start: 2020-03-12 | End: 2020-10-02

## 2020-03-12 ASSESSMENT — PATIENT HEALTH QUESTIONNAIRE - PHQ9
SUM OF ALL RESPONSES TO PHQ QUESTIONS 1-9: 4
5. POOR APPETITE OR OVEREATING: SEVERAL DAYS

## 2020-03-12 ASSESSMENT — ANXIETY QUESTIONNAIRES
GAD7 TOTAL SCORE: 10
7. FEELING AFRAID AS IF SOMETHING AWFUL MIGHT HAPPEN: SEVERAL DAYS
1. FEELING NERVOUS, ANXIOUS, OR ON EDGE: SEVERAL DAYS
IF YOU CHECKED OFF ANY PROBLEMS ON THIS QUESTIONNAIRE, HOW DIFFICULT HAVE THESE PROBLEMS MADE IT FOR YOU TO DO YOUR WORK, TAKE CARE OF THINGS AT HOME, OR GET ALONG WITH OTHER PEOPLE: SOMEWHAT DIFFICULT
3. WORRYING TOO MUCH ABOUT DIFFERENT THINGS: MORE THAN HALF THE DAYS
6. BECOMING EASILY ANNOYED OR IRRITABLE: SEVERAL DAYS
2. NOT BEING ABLE TO STOP OR CONTROL WORRYING: MORE THAN HALF THE DAYS
5. BEING SO RESTLESS THAT IT IS HARD TO SIT STILL: MORE THAN HALF THE DAYS

## 2020-03-13 ASSESSMENT — ANXIETY QUESTIONNAIRES: GAD7 TOTAL SCORE: 10

## 2020-03-17 ENCOUNTER — PRE VISIT (OUTPATIENT)
Dept: NEUROLOGY | Facility: CLINIC | Age: 25
End: 2020-03-17

## 2020-03-24 ENCOUNTER — TELEPHONE (OUTPATIENT)
Dept: FAMILY MEDICINE | Facility: CLINIC | Age: 25
End: 2020-03-24

## 2020-03-24 ENCOUNTER — E-VISIT (OUTPATIENT)
Dept: FAMILY MEDICINE | Facility: CLINIC | Age: 25
End: 2020-03-24
Payer: COMMERCIAL

## 2020-03-24 DIAGNOSIS — M54.41 ACUTE BILATERAL LOW BACK PAIN WITH RIGHT-SIDED SCIATICA: Primary | ICD-10-CM

## 2020-03-24 PROCEDURE — 99422 OL DIG E/M SVC 11-20 MIN: CPT | Performed by: NURSE PRACTITIONER

## 2020-03-24 RX ORDER — CYCLOBENZAPRINE HCL 10 MG
10 TABLET ORAL 3 TIMES DAILY PRN
Qty: 90 TABLET | Refills: 0 | Status: SHIPPED | OUTPATIENT
Start: 2020-03-24 | End: 2020-04-17

## 2020-03-24 RX ORDER — PREDNISONE 20 MG/1
40 TABLET ORAL DAILY
Qty: 10 TABLET | Refills: 0 | Status: SHIPPED | OUTPATIENT
Start: 2020-03-24 | End: 2020-03-29

## 2020-03-24 RX ORDER — LIDOCAINE 4 G/G
1 PATCH TOPICAL EVERY 24 HOURS
Qty: 15 PATCH | Refills: 0 | Status: SHIPPED | OUTPATIENT
Start: 2020-03-24 | End: 2020-08-05

## 2020-03-24 NOTE — TELEPHONE ENCOUNTER
Do not see e-visit.  Spoke with Marsha Hernandez and she has no e-visits pending.  Spoke with patient and she is going to resend e-visit.

## 2020-03-24 NOTE — TELEPHONE ENCOUNTER
Reason for Call:  Other call back    Detailed comments: pt took evisit on mychart on Sunday.  Has not heard back.  Please call pt and advise    Phone Number Patient can be reached at: Cell number on file:    Telephone Information:   Mobile [phone]       Best Time: any    Can we leave a detailed message on this number? YES    Call taken on 3/24/2020 at 9:40 AM by Angeles Ramos

## 2020-03-24 NOTE — TELEPHONE ENCOUNTER
Provider E-Visit time total (minutes): Mason Onofre,    Based off your reported symptoms and history of similar back pain, I have sent over 3 medications to help with your back pain symptoms. Start prednisone tomorrow morning, as this can make you not sleep as well; this will help to get the inflammation down near your spine and will help to relieve your leg symptoms.  Use lidocaine pain patch and flexeril- as muscle relaxer as needed.  Please let me know if your symptoms persist or worsen.  Then you will need an in person evaluation/ possible imaging.  Take care and stay healthy!    Sincerely,    HERMES Henderson, FNP-BC    -------    To: Marsha Hernandez NP      From: Jemima Joy Augustine      Created: 3/24/2020 10:30 AM        *-*-*This message has not been handled.*-*-*    Back Pain  --------------------------------     Question: Where are you having pain  Answer:   Lower Back     Question: Does the pain extend into your legs?  Answer:   Yes, into my right leg     Question: How bad is the pain?  Answer:   The pain is as bad as I have ever had     Question: Did you have an injury that caused the pain?  Answer:   No, I cannot remember an injury     Question: How long has the pain been present?  Answer:   More than 2 days but less than 1 week     Question: Have you had back pain in the past?  Answer:   I have had back pain before, but this is markedly different     Question: Please list any medications you have previously taken for back pain.  Answer:   ibuprofen and tylenol. I gave myself a torodol shot for a bad headache, but it helped my back and leg for a little bit.     Question: Do you have a fever?  Answer:   No, I do not have a fever     Question: Do you have any of the following?  Answer:   None of the above     Question: What makes the pain worse?  Answer:   Any movement     Question: What makes the pain better  Answer:   None of the above     Question: Have you ever been diagnosed with  cancer?  Answer:   No     Question: Have you ever been diagnosed with arthritis?  Answer:   No     Question: Have you ever been diagnosed with osteoporosis or any other bone weakness?  Answer:   No     Question: Have you ever had surgery on your back or spine?  Answer:   No     Question: What is your usual health status?  Answer:   I am active and can move normally     Question: Wrap up  Answer:        Question: Anything else you would like to add?  Answer:   I have had similar pain previously in my lower back and then into my right upper thigh, however this is worse than I have ever experienced. It has been painful and very uncomfortable to do anything since Sunday. It doesn't hurt to the touch, it feels like it is deeper in the bottom of my spine. I noticed the pain on Friday but it was tolerable, I didn't think much of it. It has only gotten worse. I have been alternating heat and cold compress, taking ibuprofen and tylenol, nothing seems to be helping.     Question: Are you pregnant or breastfeeding?  Answer:   I am confident that I am neither

## 2020-03-24 NOTE — PATIENT INSTRUCTIONS
Patient Education     Relieving Back Pain  Back pain is a common problem. You can strain back muscles by lifting too much weight or just by moving the wrong way. Back strain can be uncomfortable, even painful. And it can take weeks or months to improve. To help yourself feel better and prevent future back strains, try these tips.  Important: Don't give aspirin to children or teens without first discussing it with your child's healthcare provider.  Ice    Ice reduces muscle pain and swelling. It helps most during the first 24 to 48 hours after an injury.    Wrap an ice pack or a bag of frozen peas in a thin towel. Never put ice directly on your skin.    Place the ice where your back hurts the most.    Don t ice for more than 20 minutes at a time.    You can use ice several times a day.  Medicines  Over-the-counter pain relievers include acetaminophen and anti-inflammatory medicines, which includes aspirin, naproxen, or ibuprofen. They can help ease discomfort. Some also reduce swelling.    Tell your healthcare provider about any medicines you are already taking.    Take medicines only as directed.  Manipulation and massage  Having manipulation by an osteopathic doctor or chiropractor may be helpful. Getting a massage also may help.   Heat  After the first 48 hours, heat can relax sore muscles and improve blood flow.    Try a warm bath or shower. Or use a heating pad set on low. To prevent a burn, keep a cloth between you and the heating pad.    Don t use a heating pad for more than 15 minutes at a time. Never sleep on a heating pad.  Date Last Reviewed: 6/1/2018 2000-2019 [employer]. [address] [place] [address], [place] [address]. All rights reserved. This information is not intended as a substitute for professional medical care. Always follow your healthcare professional's instructions.

## 2020-03-30 ENCOUNTER — E-VISIT (OUTPATIENT)
Dept: FAMILY MEDICINE | Facility: CLINIC | Age: 25
End: 2020-03-30
Payer: COMMERCIAL

## 2020-03-30 DIAGNOSIS — Z09 FOLLOW-UP EXAM: ICD-10-CM

## 2020-03-30 DIAGNOSIS — M54.9 ACUTE BACK PAIN, UNSPECIFIED BACK LOCATION, UNSPECIFIED BACK PAIN LATERALITY: Primary | ICD-10-CM

## 2020-03-30 PROCEDURE — 99422 OL DIG E/M SVC 11-20 MIN: CPT | Performed by: NURSE PRACTITIONER

## 2020-03-30 RX ORDER — HYDROCODONE BITARTRATE AND ACETAMINOPHEN 5; 325 MG/1; MG/1
1 TABLET ORAL EVERY 8 HOURS PRN
Qty: 9 TABLET | Refills: 0 | Status: SHIPPED | OUTPATIENT
Start: 2020-03-30 | End: 2020-08-05

## 2020-03-30 NOTE — TELEPHONE ENCOUNTER
Provider E-Visit time total (minutes): 12    Jemima,    I am sorry that those medications are not helping your symptoms, as they are usually the best bet. I am able to send you 3 days worth of pain medication to try for your pain, but I am not sure if this will relieve your pain as the other medications did not.  If your symptoms persist or worsen, you will need to be seen for evaluation in clinic.    Sincerely,    HERMES Henderson, [facility]-BC  ---------    To: Go Hernandez NP      From: Jemima Augustine      Created: 3/30/2020 9:43 AM        *-*-*This message was handled on 3/30/2020 10:01 AM by GO HERNANDEZ*-*-*    Back Pain  --------------------------------     Question: Where are you having pain  Answer:   Lower Back     Question: Does the pain extend into your legs?  Answer:   Yes, into my right leg     Question: How bad is the pain?  Answer:   The pain is as bad as I have ever had     Question: Did you have an injury that caused the pain?  Answer:   No, I cannot remember an injury     Question: How long has the pain been present?  Answer:   More than 1 week but less then 4 weeks     Question: Have you had back pain in the past?  Answer:   I have had back pain before, but this is markedly different     Question: Please list any medications you have previously taken for back pain.  Answer:   ibuprofen, Tylenol, and the two prescription you sent me last week. Nothing is helping it.     Question: Do you have a fever?  Answer:   No, I do not have a fever     Question: Do you have any of the following?  Answer:   None of the above     Question: What makes the pain worse?  Answer:   Any movement     Question: What makes the pain better  Answer:   Nothing makes it better     Question: Have you ever been diagnosed with cancer?  Answer:   No     Question: Have you ever been diagnosed with arthritis?  Answer:   No     Question: Have you ever been diagnosed with osteoporosis or any other bone  weakness?  Answer:   No     Question: Have you ever had surgery on your back or spine?  Answer:   No     Question: What is your usual health status?  Answer:   I am active and can move normally     Question: Wrap up  Answer:        Question: Anything else you would like to add?  Answer:   The pain is as bad as it was when it started. Nothing seems to help it, I am constantly doing hot and cold compress and taking what was prescribed. The pain is in my lower back towards my right side and in my right leg.     Question: Are you pregnant or breastfeeding?  Answer:   I am confident that I am neither

## 2020-03-30 NOTE — PATIENT INSTRUCTIONS
Patient Education     General Neck and Back Pain    Both neck and back pain are usually caused by injury to the muscles or ligaments of the spine. Sometimes the disks that separate each bone of the spine may cause pain by pressing on a nearby nerve. Back and neck pain may appear after a sudden twisting or bending force (such as in a car accident), or sometimes after a simple awkward movement. In either case, muscle spasm is often present and adds to the pain.  Acute neck and back pain usually gets better in 1 to 2 weeks. Pain related to disk disease, arthritis in the spinal joints or spinal stenosis (narrowing of the spinal canal) can become chronic and last for months or years.  Back and neck pain are common problems. Most people feel better in 1 or 2 weeks, and most of the rest in 1 to 2 months. Most people can remain active.  People have and describe pain differently.    Pain can be sharp, stabbing, shooting, aching, cramping, or burning    Movement, standing, bending, lifting, sitting, or walking may worsen the pain    Pain can be localized to one spot or area, or it can be more generalized    Pain can spread or radiate upwards, downwards, to the front, or go down your arms    Muscle spasm may occur.  Most of the time mechanical problems with the muscles or spine cause the pain. it is usually caused by an injury, whether known or not, to the muscles or ligaments. While illnesses can cause back pain, it is usually not caused by a serious illness. Pain is usually related to physical activity, whether sports, exercise, work, or normal activity. Sometimes it can occur without an identifiable cause. This can happen simply by stretching or moving wrong, without noting pain at the time. Other causes include:    Overexertion, lifting, pushing, pulling incorrectly or too aggressively.    Sudden twisting, bending or stretching from an accident (car or fall), or accidental movement.    Poor posture    Poor conditioning,  lack of regular exercise    Spinal disc disease or arthritis    Stress    Pregnancy, or illness like appendicitis, bladder or kidney infection, pelvic infections   Home care    For neck pain: Use a comfortable pillow that supports the head and keeps the spine in a neutral position. The position of the head should not be tilted forward or backward.    When in bed, try to find a position of comfort. A firm mattress is best. Try lying flat on your back with pillows under your knees. You can also try lying on your side with your knees bent up towards your chest and a pillow between your knees.    At first, do not try to stretch out the sore spots. If there is a strain, it is not like the good soreness you get after exercising without an injury. In this case, stretching may make it worse.    Don't sit for long periods, as in long car rides or other travel. This puts more stress on the lower back than standing or walking.    During the first 24 to 72 hours after an injury, apply an ice pack to the painful area for 20 minutes and then remove it for 20 minutes over a period of 60 to 90 minutes or several times a day.     You can alternate ice and heat therapies. Talk with your healthcare provider about the best treatment for your back or neck pain. As a safety precaution, do not use a heating pad at bedtime. Sleeping with a heating pad can lead to skin burns or tissue damage.    Therapeutic massage can help relax the back and neck muscles without stretching them.    Be aware of safe lifting methods and do not lift anything over 15 pounds until all the pain is gone.  Medicines  Talk to your healthcare provider before using medicine, especially if you have other medical problems or are taking other medicines.    You may use over-the-counter medicine to control pain, unless another pain medicine was prescribed. If you have chronic conditions like diabetes, liver or kidney disease, stomach ulcers,  gastrointestinal bleeding, or  are taking blood thinner medicines.    Be careful if you are given pain medicines, narcotics, or medicine for muscle spasm. They can cause drowsiness, and can affect your coordination, reflexes, and judgment. Do not drive or operate heavy machinery.  Follow-up care  Follow up with your healthcare provider, or as advised. Physical therapy or further tests may be needed.  If X-rays were taken, you will be notified of any new findings that may affect your care.  Call 911  Call 911 if any of the following occur:    Trouble breathing    Confusion    Very drowsy or trouble awakening    Fainting or loss of consciousness    Rapid or very slow heart rate    Loss of bowel or bladder control  When to seek medical advice  Call your healthcare provider right away if any of these occur:    Pain becomes worse or spreads into your arms or legs    Weakness, numbness or pain in one or both arms or legs    Numbness in the groin area    Difficulty walking    Fever of 100.4 F (38 C) or higher, or as directed by your healthcare provider  Date Last Reviewed: 7/1/2016 2000-2019 [employer]. [address] [place] [address]. All rights reserved. This information is not intended as a substitute for professional medical care. Always follow your healthcare professional's instructions.           Patient Education     Back Care Tips    Caring for your back  These are things you can do to prevent a recurrence of acute back pain and to reduce symptoms from chronic back pain:    Stay at a healthy weight. If you are overweight, losing weight will help most types of back pain.    Exercise is an important part of recovery from most types of back pain. The muscles behind and in front of the spine support the back. This means strengthening both the back muscles and the abdominal muscles will provide better support for your spine.     Swimming and brisk walking are good overall exercises to improve your fitness  level.    Practice safe lifting methods (see below).    Practice good posture when sitting, standing, and walking. Don't sit for a long time. This puts more stress on the lower back than standing or walking.    Wear quality shoes with good arch support. Foot and ankle alignment can affect back symptoms. Don't wear high heels.    Therapeutic massage can help relax the back muscles without stretching them.    During the first 24 to 72 hours after an acute injury or flare-up of chronic back pain, put an ice pack on the painful area for 20 minutes and then remove it for 20 minutes. Do thisover a period of 60 to 90 minutes, or several times a day. As a safety precaution, don't use a heating pad at bedtime. Sleeping on a heating pad can lead to skin burns or tissue damage.    You can alternate using ice and heat.  Medicines  Talk with your healthcare provider before using medicines, especially if you have other health problems or are taking other medicines.    You may use over-the-counter medicines, such as acetaminophen, ibuprofen, or naprosyn to control pain, unless your healthcare provider prescribed other pain medicine. Talk with your healthcare provider before taking any medicines if you have a chronic condition such ass diabetes, liver or kidney disease, stomach ulcers, or digestive bleeding, or are taking blood thinners.    Be careful if you are given prescription pain medicines, opioids, or medicine for muscle spasm. They can cause drowsiness, and affect your coordination, reflexes, and judgment. Don't drive or operate heavy machinery while taking these types of medicines. Take prescription pain medicine only as prescribed by your healthcare provider.  Lumbar stretch  This simple stretch will help relax muscle spasm and keep your back more limber. If exercise makes your back pain worse, don t do it.    Lie on your back with your knees bent and both feet on the ground.    Slowly raise your left knee to your chest  as you flatten your lower back against the floor. Hold for 5 seconds.    Relax and repeat the exercise with your right knee.    Do 10 of these exercises for each leg.  Safe lifting method    Don t bend over at the waist to lift an object off the floor.  Instead, bend your knees and hips in a squat.     Keep your back and head upright    Hold the object close to your body, directly in front of you.    Straighten your legs to lift the object.     Lower the object to the floor in the reverse fashion.    If you must slide something across the floor, push it.    Posture tips  Sitting  Sit in chairs with straight backs or low-back support. Keep your knees lower than your hips, with your feet flat on the floor.  When driving, sit up straight. Adjust the seat forward so you are not leaning toward the steering wheel.  A small pillow or rolled towel behind your lower back may help if you are driving long distances.   Standing  When standing for long periods, shift most of your weight to one leg at a time. Switch legs every few minutes.   Sleeping  The best way to sleep is on your side with your knees bent. Put a low pillow under your head to support your neck in a neutral spine position. Don't use thick pillows that bend your neck to one side. Put a pillow between your legs to further relax your lower back. If you sleep on your back, put pillows under your knees to support your legs in a slightly flexed position. Use a firm mattress. If your mattress sags, replace it, or use a 1/2-inch plywood board under the mattress to add support.  Follow-up care  Follow up with your healthcare provider, or as advised.  If X-rays, a CT scan or an MRI scan were taken, they may be reviewed by a radiologist. You will be told of any new findings that may affect your care.  Call 911  Call 911 if any of the following occur:    Trouble breathing    Confusion    Very drowsy    Fainting or loss of consciousness    Rapid or very slow heart  rate    Loss of  bowel or bladder control  When to seek medical advice  Call your healthcare provider right away if any of the following occur:    Pain becomes worse or spreads to your arms or legs    Weakness or numbness in one or both arms or legs    Numbness in the groin area  Date Last Reviewed: 6/1/2016 2000-2019 [employer]. [address] [place] [id number]. All rights reserved. This information is not intended as a substitute for professional medical care. Always follow your healthcare professional's instructions.

## 2020-04-14 ENCOUNTER — VIRTUAL VISIT (OUTPATIENT)
Dept: NEUROLOGY | Facility: CLINIC | Age: 25
End: 2020-04-14
Payer: COMMERCIAL

## 2020-04-14 DIAGNOSIS — R55 SYNCOPE, UNSPECIFIED SYNCOPE TYPE: Primary | ICD-10-CM

## 2020-04-14 NOTE — PROGRESS NOTES
"Jemima Augustine is a 25 year old female who is being evaluated via a billable video visit.      The patient has been notified of following:     \"This video visit will be conducted via a call between you and your physician/provider. We have found that certain health care needs can be provided without the need for an in-person physical exam.  This service lets us provide the care you need with a video conversation.  If a prescription is necessary we can send it directly to your pharmacy.  If lab work is needed we can place an order for that and you can then stop by our lab to have the test done at a later time.    Video visits are billed at different rates depending on your insurance coverage.  Please reach out to your insurance provider with any questions.    If during the course of the call the physician/provider feels a video visit is not appropriate, you will not be charged for this service.\"    Patient has given verbal consent for Video visit? YES    Patient would like the video invitation sent by: Email  [email or web address]      tel Start Time: 4:00pm   finish: 4:45    TRENA Oreilly      Additional provider notes:see note    Video-Visit Details not done. Telephone  45 min    Type of service:  telephone  Video End Time no video     O             "

## 2020-04-15 DIAGNOSIS — R40.4 SPELL OF ALTERED CONSCIOUSNESS: Primary | ICD-10-CM

## 2020-04-15 NOTE — PROGRESS NOTES
Service Date: 2020      TELEPHONE NOTE       [facility]   [address]   [place] [address]      RE: Jemima Augustine    MRN: [id number]   : 1995      Dear Jacki:       We had the privilege of doing a neurological assessment through a telephone visit with Ms. Jemima Augustine, a 25-year-old female who was evaluated for possible syncope.  This patient had been seen by our headache specialist, and she reported some episodes of loss of consciousness, which were diagnosed as possible seizures in [facility], and they were going to put her on medication, and she sought a second opinion.  The history was obtained from her and her fiance, who was with her during the telephone interview.  Attempts at video were not successful as she did not have a system.  The patient reports that she has been having these episodes where she will pass out or nearly pass out, and they are frequently associated with headaches.  She had been seen on  by our headache specialist, Ms. Anne Nava, and she reports in her notes that she is having migraine headaches that are rare.  She has been on propranolol and amitriptyline for 3 years, and apparently part of her headache has been better.  She has a constant more of a headache like a rubber band and shooting pain in the occipital area with moving her head and frequency a couple of times per month, worse in the summer.  Since December, she has had them, also, and she did get some Toradol injections every other week.  The patient has no aura for these headaches.  She has nausea, light sensitivity, sound sensitivity and blurred vision.  These are with the migraine, but not with the current headache.  Her current headache is a rubber band around her head, a feeling of tightness, sometimes sharp behind her ears.  She misses work with these.  The issue of loss-of-consciousness episodes was then reviewed.  She has had an MRI and MRA at the [facility], and she was recommended anticonvulsant therapy, but she declined.  She said that there was some abnormality on an EEG associated with her fainting spell.  According to her fiance, whom I spoke with on the phone, she will frequently have these episodes where she will sit down and seem not to respond.  He has seen her one time that she had some convulsive activity with it, and this was an unusual one.  Only once it happened, but they were using marijuana that day.  She says that she has had a sensation of pressure in her head, which Anne alluded to.  She has had no major injury with these fainting or near-fainting episodes.  She says she may have had about a total of 10 of these.  She had only one possible convulsion that he described as some activity of a motoric type.  These are not related to any blood draw or any hot sensation or eating or alcohol.  She says she does feel dizzy standing up, but does not really pass out with those.  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  She is engaged.  She has been having these episodes of near fainting or fainting less frequently.  Standing up quickly seems to produce them.  She has had one twitch that she knows of.  Occasionally, anxiety will produce them.  Her records were searched, and I did not see an EEG report from [facility].        PAST MEDICAL HISTORY:  Migraines that are related to fainting spells and this episode of a possible generalized convulsion, but there was no tongue biting or incontinence.  She had surgery on the right foot.      FAMILY HISTORY:  Not available, as she is adopted.  She is a grade  at a charter school in [place].  She grew up in [place], [place], and graduated from [employer].  She has a wedding planned for June.         SOCIAL HISTORY:  No smoking history or alcohol, either.        CURRENT MEDICATIONS:   1.  Amitriptyline 25 mg at bedtime.     2.  Adderall DOSE ?.   3.  Inderal 60.   4.  Maxalt.   5.   Sertraline.      REVIEW OF SYSTEMS:  Otherwise unremarkable.        PHYSICAL EXAMINATION:  She weighs 68 kg.  BMI is 26.6.  Her previous blood pressure was 112 sitting and standing 106 without any symptoms.  Her neurological exam is reported to be normal.      The patient's outside records were reviewed, and we did not find her EEG studies, and we will look for that.      In summary, on the telephone interview, these episodes she has suggest some syncope or near syncope, perhaps associated with her headaches.  There was, however, one episode where it was reported that she had some generalized twitching activity, and an EEG at [facility] had apparently shown seizure activity.  She drives, and she says she drives to work at [employer] where she is a teacher, and she was cautioned about that.  She says she never loses consciousness with these episodes except for that big one, which was associated with the use of marijuana.      We will have to see her and then do EEG studies, so we will order that and see what type of EEG activity was noted before.  Unfortunately, we do not have any family history because she is adopted.  She was apparently seen by Cardiology, and she had been seen by Dr. Jean at the beginning of the year, but it was mostly regarding the headache and not that episode of loss of consciousness.  In the Cardiology area, she had an EKG done on the 10th of this month, and there was a sinus rhythm with abnormalities noted with low-voltage QRS and consider septal infarct and abnormal EKG.  She does not have any history of any cardiac issues.  I believe she is pending an appointment for a cardiac evaluation, but I do not see it on the chart.  All I have is a followup visit next Wednesday with Anne Nava, our nurse in the [facility].  We will inquire more about this issue.  Prior to this EKG, she had an MRI in [facility] in 2018, and this was for seizures and migraine headaches, and there was an MRA read  as normal.  We will follow through and get an EEG scheduled and then see her after the EEG to finish up the workup of this and see if she needs to be seen in Cardiology for EP studies.   TOTAL TEL VISIT TIME; 1 HOUR     Sincerely,      MD CODEY Marie MD             D: 04/15/2020   T: 04/15/2020   MT: lorenzo      Name:     HOLLI CRUZ   MRN:      [id number]        Account:      [id number]   :      1995           Service Date: 2020      Document: [id number]

## 2020-04-16 ENCOUNTER — E-VISIT (OUTPATIENT)
Dept: FAMILY MEDICINE | Facility: CLINIC | Age: 25
End: 2020-04-16
Payer: COMMERCIAL

## 2020-04-16 DIAGNOSIS — M54.40 LOW BACK PAIN WITH SCIATICA, SCIATICA LATERALITY UNSPECIFIED, UNSPECIFIED BACK PAIN LATERALITY, UNSPECIFIED CHRONICITY: Primary | ICD-10-CM

## 2020-04-16 DIAGNOSIS — Z09 FOLLOW-UP EXAM: ICD-10-CM

## 2020-04-16 PROCEDURE — 99207 ZZC NON-BILLABLE SERV PER CHARTING: CPT | Performed by: NURSE PRACTITIONER

## 2020-04-22 ENCOUNTER — VIRTUAL VISIT (OUTPATIENT)
Dept: NEUROLOGY | Facility: CLINIC | Age: 25
End: 2020-04-22
Payer: COMMERCIAL

## 2020-04-22 DIAGNOSIS — Z53.9 ERRONEOUS ENCOUNTER--DISREGARD: Primary | ICD-10-CM

## 2020-04-22 NOTE — PROGRESS NOTES
LVM x2 No response    Jemima Augustine is a 25 year old female who was scheduled for a return  video visit. I called patient at her scheduled appointment time and no answer. I left a VM to call us back if she would like to reschedule this appointment.           This encounter was opened in error. Please disregard.

## 2020-04-24 ENCOUNTER — VIRTUAL VISIT (OUTPATIENT)
Dept: NEUROLOGY | Facility: CLINIC | Age: 25
End: 2020-04-24
Payer: COMMERCIAL

## 2020-04-24 DIAGNOSIS — G43.719 INTRACTABLE CHRONIC MIGRAINE WITHOUT AURA AND WITHOUT STATUS MIGRAINOSUS: Primary | ICD-10-CM

## 2020-04-24 ASSESSMENT — PAIN SCALES - GENERAL: PAINLEVEL: MODERATE PAIN (5)

## 2020-04-24 NOTE — PATIENT INSTRUCTIONS
Plan:  Headache log brent frequency and severity  Migraine headache prevention-vitamin B2 (riboflavin) 200 mg daily OTC   Emgality  for chronic migraine treatment   Continue amitriptyline and propranolol until stable with starting Emgality. Use birth control  Acute migraine treatment -rizatriptan as needed.   May try naproxen 1-2 tabs every 12 hours as needed for mild to moderate headache and limit use to no more than 10 days per month  Follow up in 3 months after starting Emgality      Patient Education     Galcanezumab injection  Brand Name: Emgality  What is this medicine?  GALCANEZUMAB (gal ka LYNN ue mab) is used to prevent migraine headaches.  How should I use this medicine?  This medicine is for injection under the skin. You will be taught how to prepare and give this medicine. Use exactly as directed. Take your medicine at regular intervals. Do not take your medicine more often than directed.  It is important that you put your used needles and syringes in a special sharps container. Do not put them in a trash can. If you do not have a sharps container, call your pharmacist or healthcare provider to get one.  Talk to your pediatrician regarding the use of this medicine in children. Special care may be needed.  What side effects may I notice from receiving this medicine?  Side effects that you should report to your doctor or health care professional as soon as possible:    allergic reactions like skin rash, itching or hives, swelling of the face, lips, or tongue  Side effects that usually do not require medical attention (report these to your doctor or health care professional if they continue or are bothersome):    pain, redness, or irritation at site where injected  What may interact with this medicine?  Interactions are not expected.  What if I miss a dose?  If you miss a dose, take it as soon as you can. If it is almost time for your next dose, take only that dose. Do not take double or extra doses.  Where  should I keep my medicine?  Keep out of the reach of children.    You will be instructed on how to store this medicine. Throw away any unused medicine after the expiration date on the label.  What should I tell my health care provider before I take this medicine?  They need to know if you have any of these conditions:    an unusual or allergic reaction to galcanezumab, other medicines, foods, dyes, or preservatives    pregnant or trying to get pregnant    breast-feeding  What should I watch for while using this medicine?  Tell your doctor or healthcare professional if your symptoms do not start to get better or if they get worse.  NOTE:This sheet is a summary. It may not cover all possible information. If you have questions about this medicine, talk to your doctor, pharmacist, or health care provider. Copyright  2019 [employer]

## 2020-04-24 NOTE — PROGRESS NOTES
"Jemima Augustine is a 25 year old female who is being evaluated via a billable video visit.      The patient has been notified of following:     \"This video visit will be conducted via a call between you and your physician/provider. We have found that certain health care needs can be provided without the need for an in-person physical exam.  This service lets us provide the care you need with a video conversation.  If a prescription is necessary we can send it directly to your pharmacy.  If lab work is needed we can place an order for that and you can then stop by our lab to have the test done at a later time.    Video visits are billed at different rates depending on your insurance coverage.  Please reach out to your insurance provider with any questions.    If during the course of the call the physician/provider feels a video visit is not appropriate, you will not be charged for this service.\"    Patient has given verbal consent for Video visit? YES     How would you like to obtain your AVS? ADAM     Patient would like the video invitation sent by: EMAIL [email or web address]"     Will anyone else be joining your video visit? NO         Video-Visit Details    Type of service:  Video Visit    Video Start Time: 4:07 PM  Video End Time: 4:41 PM    Originating Location (pt. Location): Home    Distant Location (provider location):  [facility] NEUROLOGY     Mode of Communication:  Video Conference via [facility]      OUTPATIENT NEUROLOGY VISIT NOTE    Reason for Visit:  Headache follow-up. This visit was conducted via synchronous video visit due to the current COVID-19 crisis to reduce patient risk.  Verbal consent was obtained.     Interval History:  Jemima Augustine is a 25-year-old female presents to the clinic today for headache follow up.     Per initial visit note on 2/17/2010, \"Headaches improved since December of 2019. Migraine headaches are rare and about 2 irma per year and tension " "headaches were more frequent last summer and fall and improved in the past 2 month to a few and used rizatriptan once and ibuprofen a several times.   Non focal neurological exam today. Patient has taking propranolol for more than 3 years and amitriptyline for about 3 years. \"    Seen Dr Diaz for spells and follow up with him    Patient reports today that she has been having almost daily headaches in March and so far in April daily. Patient reports that once she started to keep headache log 2/17 she has realized frequent headache.   Patient reports that headache is localized to one side or other side or in the temples-each time site differs. Duration for a few days for one kind and than other comes. Patient reports that she had about 2 severe headaches about 9/10 on the numeric. Patient reports that she took rizatriptan and if rizatriptan were not effective she would use toradol injections. Daily headaches 4-5/10 on the numeric pain scale -causes nausea and being in the car, sometimes light sensitivity, and sometimes ringing in the ears and blurry vision. Getting  soon and would be thinking of getting family     Treatments Tried:    Propranolol for many years at 60 mg ER and was on 100 mg and caused syncope  Amitriptyline 25 mg at bedtime and tried to increase it to 50 mg and was causing \"faintings\"  Sumatriptan -helped in the past but stopped working  Rizatriptan -new med and helped when tried it  lamictal -caused twitching   Patient reports that she takes ibuprofen 5-6 days per months and acetaminophen about 5 days per month.     Intractable migraine headaches. Discussed CGRPs but patient reports that she is getting  soon and would like to plan pregnancy in a few years. Side effects discussed.  Patient has an IUD currently  Plan:  Headache log brent frequency and severity  Migraine headache prevention-vitamin B2 (riboflavin) 200 mg daily OTC   Emgality  for chronic migraine treatment   Continue " "amitriptyline and propranolol until stable with starting Emgality. Use birth control  Acute migraine treatment -rizatriptan as needed.   May try naproxen 1-2 tabs every 12 hours as needed for mild to moderate headache and limit use to no more than 10 days per month  Follow up in 3 months after starting Emgality       Past Medical History reviewed   Social History     Tobacco Use     Smoking status: Never Smoker     Smokeless tobacco: Never Used   Substance Use Topics     Alcohol use: Never     Frequency: Never    reviewed and verified with the patient     Allergies   Allergen Reactions     Lisdexamfetamine Other (See Comments)       Current Outpatient Medications   Medication Sig Dispense Refill     amitriptyline (ELAVIL) 25 MG tablet Take 2 tablets (50 mg) by mouth At Bedtime 60 tablet 1     amphetamine-dextroamphetamine (ADDERALL XR) 15 MG 24 hr capsule Take 1 capsule (15 mg) by mouth daily 30 capsule 0     [START ON 5/13/2020] amphetamine-dextroamphetamine (ADDERALL XR) 15 MG 24 hr capsule Take 1 capsule (15 mg) by mouth daily 30 capsule 0     amphetamine-dextroamphetamine (ADDERALL) 5 MG tablet Take 1 tablet (5 mg) by mouth daily as needed 30 tablet 0     cyclobenzaprine (FLEXERIL) 10 MG tablet TAKE ONE TABLET BY MOUTH THREE TIMES A DAY AS NEEDED FOR MUSCLE SPASMS 30 tablet 0     ketorolac (TORADOL) 30 MG/ML injection Inject 1 mL (30 mg) into the muscle as needed for moderate pain (do not use more than weekly) 4 mL 3     Lidocaine (LIDOCARE) 4 % Patch Place 1 patch onto the skin every 24 hours To prevent lidocaine toxicity, patient should be patch free for 12 hrs daily. 15 patch 0     Needle, Disp, (BD ECLIPSE NEEDLE) 25G X 5/8\" MISC 1 Syringe daily as needed 25 each 1     propranolol (INDERAL) 60 MG tablet Take 60 mg by mouth       rizatriptan (MAXALT-MLT) 10 MG ODT Take 1 tablet (10 mg) by mouth at onset of headache for migraine May repeat in 2 hours. Max 3 tablets/24 hours. 9 tablet 1     Syringe, Disposable, " (SYRINGE LUER LOCK) 3 ML MISC 1 Syringe daily as needed 25 each 1     sertraline (ZOLOFT) 100 MG tablet Take 2 tablets (200 mg) by mouth daily 180 tablet 1   reviewed and verified with the patient    Review of Systems:   A 10-point ROS including constitutional, eyes, respiratory, cardiovascular, gastroenterology, genitourinary, integumentary, musculoskeletal, neurology and psychiatric were all reviewed and  as mentioned in the interval history.     General Exam:   There were no vitals taken for this visit.  GEN: Awake, NAD;responses appropriately, healthy appearing. Speech is fluent without dysarthria. EOM intact. Face is symmetrical. Intact and symmetrical eyebrow and lid raise and eyelid closure, smiles. Hearing Intact to conversation speech.  Assessment and Plan:  See Interval History for our discussion and plan    Prescription for Emgality provided. Correct use and course provided. Expected benefits and typical side effects reviewed. Safety of concomitant medications and interactions reviewed.       I discussed all my recommendation with Jemima Augustine. The patient verbalizes understanding and comfortable with the plan. The patient has our clinic phone number to call with any questions or concerns. All of the patient's questions were answered from the best of my current knowledge.     Time spent with pt answering questions, discussing findings, counseling and coordinating care was more than 50% the appointment time, 38  minutes.         HERMES Gonzalez, CNP  [facility]

## 2020-04-27 ENCOUNTER — TELEPHONE (OUTPATIENT)
Dept: NEUROLOGY | Facility: CLINIC | Age: 25
End: 2020-04-27

## 2020-04-27 NOTE — TELEPHONE ENCOUNTER
Prior Authorization Approval for 3mos, then will have to re-new P/A with documentation of improvement while on the Emgality    Authorization Effective Date: 4/24/2020  Authorization Expiration Date: 7/24/2020  Medication: emgality 240 loadig dose then 120 mg every month  Approved Dose/Quantity: 2ml first month, then 1ml monthly  Reference #:     Insurance Company: [facility] - Phone [phone] Fax [phone]   Which Pharmacy is filling the prescription (Not needed for infusion/clinic administered): [id number] [place], [place] - [address] AT [facility] [address]  Pharmacy Notified: Yes - faxed approval letter to pharmacy with note to let patient know when rx is ready. My direct contact info given if needing anything further.  Patient Notified:

## 2020-04-27 NOTE — TELEPHONE ENCOUNTER
Prior Authorization Retail Medication Request    Medication/Dose: emgality 240 loadig dose then 120 mg every month  ICD code (if different than what is on RX):  Chronic migraine  Previously Tried and Failed:  Propranolol,amitriptyline,sumatriptan,rizatriptan,lamictal,    Rationale:  Daily headache for three months with light,sight sensitivity, and nausea.  9/10 on pain scale.  Missing work and activities    Insurance Name:  [facility]  Insurance ID:  [id number]      Pharmacy Information (if different than what is on RX)  Name:    Phone:

## 2020-04-27 NOTE — TELEPHONE ENCOUNTER
Central Prior Authorization Team   Phone: [phone]    PA Initiation    Medication: emgality 240 loadig dose then 120 mg every month  Insurance Company: [employer] [place] - Phone [phone] Fax [phone]  Pharmacy Filling the Rx: [facility] #[id number] [place], [place] - [address] AT [facility] OF [address]  Filling Pharmacy Phone: [phone]  Filling Pharmacy Fax: [phone]  Start Date: 4/27/2020

## 2020-05-05 DIAGNOSIS — G43.009 MIGRAINE WITHOUT AURA AND WITHOUT STATUS MIGRAINOSUS, NOT INTRACTABLE: ICD-10-CM

## 2020-05-07 RX ORDER — RIZATRIPTAN BENZOATE 10 MG/1
TABLET, ORALLY DISINTEGRATING ORAL
Qty: 9 TABLET | Refills: 1 | OUTPATIENT
Start: 2020-05-07

## 2020-05-07 NOTE — TELEPHONE ENCOUNTER
Duplicate, last ordered by Neurologist, refill has been sent to neurologist 5/4/20    Niyah Goodson RN

## 2020-05-11 ENCOUNTER — MYC MEDICAL ADVICE (OUTPATIENT)
Dept: FAMILY MEDICINE | Facility: CLINIC | Age: 25
End: 2020-05-11

## 2020-05-11 DIAGNOSIS — F90.0 ADHD (ATTENTION DEFICIT HYPERACTIVITY DISORDER), INATTENTIVE TYPE: ICD-10-CM

## 2020-05-11 DIAGNOSIS — G43.911 INTRACTABLE MIGRAINE WITH STATUS MIGRAINOSUS, UNSPECIFIED MIGRAINE TYPE: ICD-10-CM

## 2020-05-11 NOTE — TELEPHONE ENCOUNTER
Routing refill request to provider for review/approval because:  Drug not on the [facility] refill protocol     Had Adderall XR 15 mg on file at [facility] pharmacy, however asking to fill at new pharmacy (My Chart message states she moved).       ketorolac (TORADOL) 30 MG/ML injection   Last Written Prescription Date:  2/4/20  Last Fill Quantity: 4 ml ,  # refills: 3   Last office visit: 3/12/2020 with prescribing provider:  Marsha LUCAS    Future Office Visit:  None.

## 2020-05-12 RX ORDER — DEXTROAMPHETAMINE SACCHARATE, AMPHETAMINE ASPARTATE MONOHYDRATE, DEXTROAMPHETAMINE SULFATE AND AMPHETAMINE SULFATE 3.75; 3.75; 3.75; 3.75 MG/1; MG/1; MG/1; MG/1
15 CAPSULE, EXTENDED RELEASE ORAL DAILY
Qty: 30 CAPSULE | Refills: 0 | Status: SHIPPED | OUTPATIENT
Start: 2020-05-13 | End: 2020-08-19

## 2020-05-12 RX ORDER — KETOROLAC TROMETHAMINE 30 MG/ML
30 INJECTION, SOLUTION INTRAMUSCULAR; INTRAVENOUS PRN
Qty: 4 ML | Refills: 0 | Status: SHIPPED | OUTPATIENT
Start: 2020-05-12 | End: 2020-07-08

## 2020-05-12 NOTE — TELEPHONE ENCOUNTER
Spoke with Flora at pharmacy and per her they will cancel any active orders for adderall and toradol.

## 2020-05-12 NOTE — TELEPHONE ENCOUNTER
Called pharmacy, spoke to pharmacist. Confirmed they originally were not able to fill the loading dose, which is why we did the P/A, but now that the approval is in place they have gotten the 2ml processed and it is ready for patient to .

## 2020-05-15 DIAGNOSIS — G43.911 INTRACTABLE MIGRAINE WITH STATUS MIGRAINOSUS, UNSPECIFIED MIGRAINE TYPE: ICD-10-CM

## 2020-05-17 RX ORDER — KETOROLAC TROMETHAMINE 30 MG/ML
INJECTION, SOLUTION INTRAMUSCULAR; INTRAVENOUS
Qty: 4 ML | Refills: 3 | OUTPATIENT
Start: 2020-05-17

## 2020-06-30 ENCOUNTER — TELEPHONE (OUTPATIENT)
Dept: NEUROLOGY | Facility: CLINIC | Age: 25
End: 2020-06-30

## 2020-06-30 NOTE — TELEPHONE ENCOUNTER
I tried to call Jemima to see if she was able to  her Emgality but her mail box was full so I could not leave a message.

## 2020-07-04 DIAGNOSIS — G43.911 INTRACTABLE MIGRAINE WITH STATUS MIGRAINOSUS, UNSPECIFIED MIGRAINE TYPE: ICD-10-CM

## 2020-07-08 RX ORDER — KETOROLAC TROMETHAMINE 30 MG/ML
INJECTION, SOLUTION INTRAMUSCULAR; INTRAVENOUS
Qty: 4 ML | Refills: 0 | Status: SHIPPED | OUTPATIENT
Start: 2020-07-08 | End: 2020-11-05

## 2020-08-03 ENCOUNTER — TELEPHONE (OUTPATIENT)
Dept: NEUROLOGY | Facility: CLINIC | Age: 25
End: 2020-08-03

## 2020-08-03 NOTE — TELEPHONE ENCOUNTER
Patient would like to know what test she needs to get done, EEG that was order back in April , sleep deprived , 3 hr ? .Please advise.

## 2020-08-03 NOTE — TELEPHONE ENCOUNTER
Patient has both an EEG ordered, and a 48 hour EKG monitor ordered.  Will contact the EEG scheudlers to get the EEG arranged.    Call placed to patient, message left on voice mail with call back number

## 2020-08-04 ENCOUNTER — TELEPHONE (OUTPATIENT)
Dept: NEUROLOGY | Facility: CLINIC | Age: 25
End: 2020-08-04

## 2020-08-04 NOTE — TELEPHONE ENCOUNTER
Message from     3 hr eeg is fine. thanks   Received: Today   Message Contents   Johnny Diaz MD Hamley, David, RN

## 2020-08-04 NOTE — TELEPHONE ENCOUNTER
My Single [employer] message sent to patient, discussed with scheduling who will call patient to schedule the VEEG

## 2020-08-05 ENCOUNTER — VIRTUAL VISIT (OUTPATIENT)
Dept: NEUROLOGY | Facility: CLINIC | Age: 25
End: 2020-08-05
Payer: COMMERCIAL

## 2020-08-05 DIAGNOSIS — G43.719 INTRACTABLE CHRONIC MIGRAINE WITHOUT AURA AND WITHOUT STATUS MIGRAINOSUS: Primary | ICD-10-CM

## 2020-08-05 RX ORDER — ONDANSETRON 4 MG/1
4 TABLET, ORALLY DISINTEGRATING ORAL EVERY 8 HOURS PRN
Qty: 20 TABLET | Refills: 3 | Status: SHIPPED | OUTPATIENT
Start: 2020-08-05 | End: 2021-01-21

## 2020-08-05 RX ORDER — ELETRIPTAN HYDROBROMIDE 20 MG/1
20-40 TABLET, FILM COATED ORAL
Qty: 18 TABLET | Refills: 3 | Status: SHIPPED | OUTPATIENT
Start: 2020-08-05 | End: 2020-09-10

## 2020-08-05 NOTE — TELEPHONE ENCOUNTER
Patient left a message on [facility] that she is ok to go ahead and have the Toradol injection, and that it is a part of her treatment plan. She can be reached at [phone].    Critical

## 2020-08-05 NOTE — LETTER
"8/5/2020       RE: Jemima Augustine  [address] [place] [id number]     Dear Colleague,    Thank you for referring your patient, Jemima Augustine, to the [facility] NEUROLOGY at [facility]. Please see a copy of my visit note below.    Jemima Augustine is a 25 year old female who is being evaluated via a billable video visit.        telephoneVisit Details    Type of service:  telephone Visit per patient request and technical issues    Start Time: 10:37 AM  End Time: 11:00 AM    Originating Location (pt. Location): Home    Distant Location (provider location):  [facility] NEUROLOGY     Platform used for Video Visit: Digly    CC:  Headache follow-up. This visit was conducted via synchronous video visit due to the current COVID-19 crisis to reduce patient risk.  Verbal consent was obtained.     Interval History:  Initial Headache Clinic visit on 2/17/2020, see note for details  Patient has been seeing Dr Diaz for spells.   Patient reports that she has EEG scheduled and will follow up with Dr Diaz after the EEG for next step.     Headaches are the same. Patient reports that she still has multiple headaches per week and last week she had a week long lasting headaches.   Reports that she has been doing Emgality for 3 months and does not appear to be effective.   Amitriptyline for years for headaches for 4 years and it does not appear to be helping and tried to increase the dose but was causing side effects.   Propranolol for 4-5 years and helps with tremor when feeling \"nervous\"  Patient reports that she uses Excedrin 1-2 per week and reports that she uses rizatriptan and half of the time it works and half of the time it does not work.     Treatments Tried:    Propranolol for many years at 60 mg ER and was on 100 mg and caused syncope  Amitriptyline 25 mg at bedtime and tried to increase it to 50 mg and was causing \"faintings\"  Sumatriptan -helped in the past " but stopped working  Rizatriptan -new med and helped when tried it  lamictal -caused twitching   Ketorolac inj -helped in the past  Sertraline for anxiety -helping with anxiety  Patient reports that she takes ibuprofen 5-6 days per months and acetaminophen about 5 days per month.       Plan discussed:  In person visit for exam   Wean of amitriptyline by taking 1/2 tablet for one week, then stop it  One more Emgality injection  Acute migraine headache treatment -eletriptan as needed at acute migraine headache onset. Do not take it with rizatriptan -wait at least 24 hours in between after taking eletriptan and before taking rizatriptan.  Limit use of all triptans to no more than 9 days per month  Ondasetron as needed for nausea.   Acute to moderate headache -naproxen 500 mg every 12 hours as needed.   Acupuncture referral  Follow up -in person 2 weeks after Emgality injection     PMH, allergies and current prescription medications reviewed    10 point ROS of systems including Constitutional, Eyes, Respiratory, Cardiovascular, Gastroenterology, Genitourinary, Integumentary, Muscularskeletal, Psychiatric were reviewed and no concerns reported today unless as mentioned in the HPI    Orientation: Normal; Language normal; Attention:  normal  Cranial nerves:  examined normal palpebral fissure, EOMI, face symmetric, shoulder shrug symmetric, tongue midline   Motor: Based on range of motion, ease of movement.      I discussed all my recommendations with Casper who verbalizes understanding and comfortable with the plan.  All of patient's questions were answered from the best of my knowledge.  Patient is in agreement with the plan.     I spent a total of 23 minutes for telemedicine consult with the patient during today s virtual meeting. Over 50% of this time was spent counseling the patient and/or coordinating care    HERMES Lewis [facility]

## 2020-08-05 NOTE — PROGRESS NOTES
"Jemima Augustine is a 25 year old female who is being evaluated via a billable video visit.      The patient has been notified of following:     \"This video visit will be conducted via a call between you and your physician/provider. We have found that certain health care needs can be provided without the need for an in-person physical exam.  This service lets us provide the care you need with a video conversation.  If a prescription is necessary we can send it directly to your pharmacy.  If lab work is needed we can place an order for that and you can then stop by our lab to have the test done at a later time.    Video visits are billed at different rates depending on your insurance coverage.  Please reach out to your insurance provider with any questions.    If during the course of the call the physician/provider feels a video visit is not appropriate, you will not be charged for this service.\"    Patient has given verbal consent for Video visit? Yes  How would you like to obtain your AVS? MyChart  If you are dropped from the video visit, the video invite should be resent to: Other e-mail: "[employer]  Will anyone else be joining your video visit? No        telephoneVisit Details    Type of service:  telephone Visit per patient request and technical issues    Start Time: 10:37 AM  End Time: 11:00 AM    Originating Location (pt. Location): Home    Distant Location (provider location):  [facility] NEUROLOGY     Platform used for Video Visit: BABADU    CC:  Headache follow-up. This visit was conducted via synchronous video visit due to the current COVID-19 crisis to reduce patient risk.  Verbal consent was obtained.     Interval History:  Initial Headache Clinic visit on 2/17/2020, see note for details  Patient has been seeing Dr Diaz for spefifi.   Patient reports that she has EEG scheduled and will follow up with Dr Diaz after the EEG for next step.     Headaches are the same. Patient reports that she still has " "multiple headaches per week and last week she had a week long lasting headaches.   Reports that she has been doing Emgality for 3 months and does not appear to be effective.   Amitriptyline for years for headaches for 4 years and it does not appear to be helping and tried to increase the dose but was causing side effects.   Propranolol for 4-5 years and helps with tremor when feeling \"nervous\"  Patient reports that she uses Excedrin 1-2 per week and reports that she uses rizatriptan and half of the time it works and half of the time it does not work.     Treatments Tried:    Propranolol for many years at 60 mg ER and was on 100 mg and caused syncope  Amitriptyline 25 mg at bedtime and tried to increase it to 50 mg and was causing \"faintings\"  Sumatriptan -helped in the past but stopped working  Rizatriptan -new med and helped when tried it  lamictal -caused twitching   Ketorolac inj -helped in the past  Sertraline for anxiety -helping with anxiety  Patient reports that she takes ibuprofen 5-6 days per months and acetaminophen about 5 days per month.       Plan discussed:  In person visit for exam   Wean of amitriptyline by taking 1/2 tablet for one week, then stop it  One more Emgality injection  Acute migraine headache treatment -eletriptan as needed at acute migraine headache onset. Do not take it with rizatriptan -wait at least 24 hours in between after taking eletriptan and before taking rizatriptan.  Limit use of all triptans to no more than 9 days per month  Ondasetron as needed for nausea.   Acute to moderate headache -naproxen 500 mg every 12 hours as needed.   Acupuncture referral  Follow up -in person 2 weeks after Emgality injection     PMH, allergies and current prescription medications reviewed    10 point ROS of systems including Constitutional, Eyes, Respiratory, Cardiovascular, Gastroenterology, Genitourinary, Integumentary, Muscularskeletal, Psychiatric were reviewed and no concerns reported today " unless as mentioned in the HPI    Orientation: Normal; Language normal; Attention:  normal  Cranial nerves:  examined normal palpebral fissure, EOMI, face symmetric, shoulder shrug symmetric, tongue midline   Motor: Based on range of motion, ease of movement.      I discussed all my recommendations with Casper who verbalizes understanding and comfortable with the plan.  All of patient's questions were answered from the best of my knowledge.  Patient is in agreement with the plan.     I spent a total of 23 minutes for telemedicine consult with the patient during today s virtual meeting. Over 50% of this time was spent counseling the patient and/or coordinating care    HERMES Lewis [facility]

## 2020-08-05 NOTE — PATIENT INSTRUCTIONS
Plan:  In person visit for exam   Wean of amitriptyline by taking 1/2 tablet for one week, then stop it  One more Emgality injection  Acute migraine headache treatment -eletriptan as needed at acute migraine headache onset. Do not take it with rizatriptan -wait at least 24 hours in between after taking eletriptan and before taking rizatriptan.  Limit use of all triptans to no more than 9 days per month  Ondasetron as needed for nausea.   Acute to moderate headache -naproxen 500 mg every 12 hours as needed.   Acupuncture referral  Follow up -in person 2 weeks after Emgality injection         Patient Education     Eletriptan tablets  Brand Name: Relpax  What is this medicine?  ELETRIPTAN (el ih TRIP tan) is used to treat migraines with or without aura. An aura is a strange feeling or visual disturbance that warns you of an attack. It is not used to prevent migraines.  How should I use this medicine?  Take this medicine by mouth with a glass of water. Follow the directions on the prescription label. This medicine is taken at the first symptoms of a migraine. It is not for everyday use. If your migraine headache returns after one dose, you can take another dose as directed. You must leave at least 2 hours between doses, and do not take more than 40 mg as a single dose. Do not take more than 80 mg total in any 24 hour period. If there is no improvement at all after the first dose, do not take a second dose without talking to your doctor or health care professional. Do not take your medicine more often than directed.  Talk to your pediatrician regarding the use of this medicine in children. Special care may be needed.  What side effects may I notice from receiving this medicine?  Side effects that you should report to your doctor or health care professional as soon as possible:    allergic reactions like skin rash, itching or hives, swelling of the face, lips, or tongue    fast, slow, or irregular heart beat    increased  or decreased blood pressure    seizures    severe stomach pain and cramping, bloody diarrhea    signs and symptoms of a blood clot such as breathing problems; changes in vision; chest pain; severe, sudden headache; pain, swelling, warmth in the leg; trouble speaking; sudden numbness or weakness of the face, arm or leg    tingling, pain, or numbness in the face, hands, or feet  Side effects that usually do not require medical attention (report to your doctor or health care professional if they continue or are bothersome):    drowsiness    feeling warm, flushing, or redness of the face    headache    muscle cramps, pain    nausea, vomiting    unusually weak or tired  What may interact with this medicine?  Do not take this medicine with any of the following medications:    antiviral medicines for HIV or AIDS    certain antibiotics like clarithromycin, erythromycin, telithromycin    cimetidine    conivaptan    dalfopristin; quinupristin    diltiazem    ergot alkaloids like dihydroergotamine, ergonovine, ergotamine, methylergonovine    fluvoxamine    idelalisib    imatinib    medicines for fungal infections like fluconazole, itraconazole, ketoconazole, and voriconazole    mifepristone    nefazodone    stimulant medicines for attention disorders, weight loss, or to stay awake    verapamil    zafirlukast  This medicine may also interact with the following medications:    certain medicines for depression, anxiety, or psychotic disturbances  What if I miss a dose?  This does not apply; this medicine is not for regular use.  Where should I keep my medicine?  Keep out of the reach of children.  Store at room temperature between 15 and 30 degrees C (59 and 86 degrees F). Throw away any unused medicine after the expiration date.  What should I tell my health care provider before I take this medicine?  They need to know if you have any of these conditions:    bowel disease or colitis    diabetes    family history of heart  disease    fast or irregular heart beat    heart or blood vessel disease, angina (chest pain), or previous heart attack    high blood pressure    high cholesterol    history of stroke, transient ischemic attacks (TIAs or mini-strokes), or intracranial bleeding    kidney or liver disease    overweight    poor circulation    postmenopausal or surgical removal of uterus and ovaries    Raynaud's disease    seizure disorder    an unusual or allergic reaction to eletriptan, other medicines, foods, dyes, or preservatives    pregnant or trying to get pregnant    breast-feeding  What should I watch for while using this medicine?  Only take this medicine for a migraine headache. Take it if you get warning symptoms or at the start of a migraine attack. It is not for regular use to prevent migraine attacks.  You may get drowsy or dizzy. Do not drive, use machinery, or do anything that needs mental alertness until you know how this medicine affects you. To reduce dizzy or fainting spells, do not sit or stand up quickly, especially if you are an older patient. Alcohol can increase drowsiness, dizziness and flushing. Avoid alcoholic drinks.  Smoking cigarettes may increase the risk of heart-related side effects from using this medicine.  If you take migraine medicines for 10 or more days a month, your migraines may get worse. Keep a diary of headache days and medicine use. Contact your healthcare professional if your migraine attacks occur more frequently.  NOTE:This sheet is a summary. It may not cover all possible information. If you have questions about this medicine, talk to your doctor, pharmacist, or health care provider. Copyright  2019 [employer]           Patient Education     Ondansetron oral dissolving tablet  Brand Name: Zofran ODT  What is this medicine?  ONDANSETRON (on DAN se bety) is used to treat nausea and vomiting caused by chemotherapy. It is also used to prevent or treat nausea and vomiting after surgery.  How  should I use this medicine?  These tablets are made to dissolve in the mouth. Do not try to push the tablet through the foil backing. With dry hands, peel away the foil backing and gently remove the tablet. Place the tablet in the mouth and allow it to dissolve, then swallow. While you may take these tablets with water, it is not necessary to do so.  Talk to your pediatrician regarding the use of this medicine in children. Special care may be needed.  What side effects may I notice from receiving this medicine?  Side effects that you should report to your doctor or health care professional as soon as possible:    allergic reactions like skin rash, itching or hives, swelling of the face, lips, or tongue    breathing problems    confusion    dizziness    fast or irregular heartbeat    feeling faint or lightheaded, falls    fever and chills    loss of balance or coordination    seizures    sweating    swelling of the hands and feet    tightness in the chest    tremors    unusually weak or tired  Side effects that usually do not require medical attention (report to your doctor or health care professional if they continue or are bothersome):    constipation or diarrhea    headache  What may interact with this medicine?  Do not take this medicine with any of the following medications:    apomorphine    certain medicines for fungal infections like fluconazole, itraconazole, ketoconazole, posaconazole, voriconazole    cisapride    dofetilide    dronedarone    pimozide    thioridazine    ziprasidone  This medicine may also interact with the following medications:    carbamazepine    certain medicines for depression, anxiety, or psychotic disturbances    fentanyl    linezolid    MAOIs like Carbex, Eldepryl, Marplan, Nardil, and Parnate    methylene blue (injected into a vein)    other medicines that prolong the QT interval (cause an abnormal heart rhythm)    phenytoin    rifampicin    tramadol  What if I miss a dose?  If you  miss a dose, take it as soon as you can. If it is almost time for your next dose, take only that dose. Do not take double or extra doses.  Where should I keep my medicine?  Keep out of the reach of children.  Store between 2 and 30 degrees C (36 and 86 degrees F). Throw away any unused medicine after the expiration date.  What should I tell my health care provider before I take this medicine?  They need to know if you have any of these conditions:    heart disease    history of irregular heartbeat    liver disease    low levels of magnesium or potassium in the blood    an unusual or allergic reaction to ondansetron, granisetron, other medicines, foods, dyes, or preservatives    pregnant or trying to get pregnant    breast-feeding  What should I watch for while using this medicine?  Check with your doctor or health care professional as soon as you can if you have any sign of an allergic reaction.  NOTE:This sheet is a summary. It may not cover all possible information. If you have questions about this medicine, talk to your doctor, pharmacist, or health care provider. Copyright  2019 [employer]

## 2020-08-13 ENCOUNTER — ANCILLARY PROCEDURE (OUTPATIENT)
Dept: NEUROLOGY | Facility: CLINIC | Age: 25
End: 2020-08-13
Attending: PSYCHIATRY & NEUROLOGY
Payer: COMMERCIAL

## 2020-08-13 DIAGNOSIS — R40.4 SPELL OF ALTERED CONSCIOUSNESS: ICD-10-CM

## 2020-08-18 ENCOUNTER — MYC MEDICAL ADVICE (OUTPATIENT)
Dept: FAMILY MEDICINE | Facility: CLINIC | Age: 25
End: 2020-08-18

## 2020-08-18 DIAGNOSIS — F90.0 ADHD (ATTENTION DEFICIT HYPERACTIVITY DISORDER), INATTENTIVE TYPE: ICD-10-CM

## 2020-08-19 RX ORDER — DEXTROAMPHETAMINE SACCHARATE, AMPHETAMINE ASPARTATE MONOHYDRATE, DEXTROAMPHETAMINE SULFATE AND AMPHETAMINE SULFATE 3.75; 3.75; 3.75; 3.75 MG/1; MG/1; MG/1; MG/1
15 CAPSULE, EXTENDED RELEASE ORAL DAILY
Qty: 30 CAPSULE | Refills: 0 | Status: SHIPPED | OUTPATIENT
Start: 2020-08-19 | End: 2020-11-10

## 2020-08-19 RX ORDER — DEXTROAMPHETAMINE SACCHARATE, AMPHETAMINE ASPARTATE, DEXTROAMPHETAMINE SULFATE AND AMPHETAMINE SULFATE 1.25; 1.25; 1.25; 1.25 MG/1; MG/1; MG/1; MG/1
5 TABLET ORAL DAILY PRN
Qty: 30 TABLET | Refills: 0 | Status: SHIPPED | OUTPATIENT
Start: 2020-08-19 | End: 2020-11-10

## 2020-08-25 ENCOUNTER — TRANSFERRED RECORDS (OUTPATIENT)
Dept: HEALTH INFORMATION MANAGEMENT | Facility: CLINIC | Age: 25
End: 2020-08-25

## 2020-09-10 ENCOUNTER — OFFICE VISIT (OUTPATIENT)
Dept: NEUROLOGY | Facility: CLINIC | Age: 25
End: 2020-09-10
Payer: COMMERCIAL

## 2020-09-10 VITALS — DIASTOLIC BLOOD PRESSURE: 75 MMHG | OXYGEN SATURATION: 100 % | SYSTOLIC BLOOD PRESSURE: 110 MMHG | HEART RATE: 102 BPM

## 2020-09-10 DIAGNOSIS — R56.9 SEIZURE-LIKE ACTIVITY (H): ICD-10-CM

## 2020-09-10 DIAGNOSIS — G43.719 INTRACTABLE CHRONIC MIGRAINE WITHOUT AURA AND WITHOUT STATUS MIGRAINOSUS: Primary | ICD-10-CM

## 2020-09-10 RX ORDER — ALBUTEROL SULFATE 90 UG/1
1 AEROSOL, METERED RESPIRATORY (INHALATION) DAILY
COMMUNITY
Start: 2020-07-31 | End: 2020-11-10

## 2020-09-10 RX ORDER — SUMATRIPTAN 100 MG/1
100 TABLET, FILM COATED ORAL DAILY
COMMUNITY
Start: 2019-07-17 | End: 2020-11-10

## 2020-09-10 RX ORDER — CYCLOBENZAPRINE HCL 10 MG
5 TABLET ORAL
Qty: 30 TABLET | Refills: 1 | Status: SHIPPED | OUTPATIENT
Start: 2020-09-10 | End: 2021-01-22

## 2020-09-10 RX ORDER — ELETRIPTAN HYDROBROMIDE 20 MG/1
20-40 TABLET, FILM COATED ORAL
Qty: 18 TABLET | Refills: 9 | Status: SHIPPED | OUTPATIENT
Start: 2020-09-10 | End: 2021-05-25

## 2020-09-10 NOTE — PATIENT INSTRUCTIONS
Plan:  Headache prevention-continue Emgality   Acute migraine headache treatment -eletriptan as needed   Cyclobenzaprine as needed refilled  Increased anxiety-follow up with PCP for options to review  Follow up in 3 months or sooner if needed-video     [facility] referral

## 2020-09-10 NOTE — NURSING NOTE
Chief Complaint   Patient presents with     RECHECK     [employer] RETURN - HEADACHE F/U      Ute Morgan, EMT

## 2020-09-10 NOTE — PROGRESS NOTES
Re: Jemima Augustine      MRN# [id number]  YOB: 1995  Date of Visit:9/10/2020     OUTPATIENT NEUROLOGY VISIT NOTE    Reason for Visit:  Headache follow-up    Interval History:  Jemima Augustine is a 25-year-old female presents to the [facility] headache clinic today for headache follow-up.  Initial Headache Clinic visit on 2/17/2020, see note for details  Patient has been seeing Dr Diaz for spells.   Patient reports that she has EEG scheduled and will follow up with Dr Diaz after the EEG for next step.   History of headaches all patient's life with severe headaches in high school reports that at initial headache clinic visit.  Patient has been seen at [facility] neurological clinic in the past.  Patient was not started on any preventative treatment at the time of her initial visit.   Emgality started end of June and has been receiving Emgality injections for 3 month and due forth injection end of September. No side effects. Recently headaches are less in the past couple of weeks. Patient reports that she weaned off amitriptyline and no difference. Patient does not think amitriptyline helped but reports that anxiety may be more prevalant.   Patient reports that eletriptan has been helpful when headaches are bad and no side effects.   Patient is still on propranolol helps with anxiety but cannot increase because of fainting spells.   Patient has been on sertraline for 6 months and haven't helped.   Patient would like to eliminate some of her medications because planning on starting family.   Muscle spasms once in while lasting for a 5 minutes and annoying. Patient would like a refill of flexeril for muscle spasms and severe headache as needed. Reviewed side effects.   Patient will notify us if planning to get pregnant to stop Emgality due to unknown side effects and unknown adverse effects in pregnancy or breast-feeding.     Headache treatment plan discussed with the patient:  Migraine headache  "prevention-continue Emgality   Acute migraine headache treatment -eletriptan as needed   Cyclobenzaprine as needed refilled  Increased anxiety-follow up with PCP for options to review  Follow up in 3 months or sooner if needed-video     [facility] concerns[facility] referral      Past Medical History reviewed   Social History     Tobacco Use     Smoking status: Never Smoker     Smokeless tobacco: Never Used   Substance Use Topics     Alcohol use: Never     Frequency: Never    reviewed and verified with the patient     Allergies   Allergen Reactions     Lisdexamfetamine Other (See Comments)       Current Outpatient Medications   Medication Sig Dispense Refill     albuterol (PROAIR HFA/PROVENTIL HFA/VENTOLIN HFA) 108 (90 Base) MCG/ACT inhaler Inhale 1 puff into the lungs daily       SUMAtriptan (IMITREX) 100 MG tablet Take 100 mg by mouth daily       amitriptyline (ELAVIL) 25 MG tablet Take 1 tablet (25 mg) by mouth At Bedtime 30 tablet 3     amphetamine-dextroamphetamine (ADDERALL XR) 15 MG 24 hr capsule Take 1 capsule (15 mg) by mouth daily 30 capsule 0     amphetamine-dextroamphetamine (ADDERALL) 5 MG tablet Take 1 tablet (5 mg) by mouth daily as needed 30 tablet 0     cyclobenzaprine (FLEXERIL) 10 MG tablet TAKE ONE TABLET BY MOUTH THREE TIMES A DAY AS NEEDED FOR MUSCLE SPASMS 30 tablet 0     eletriptan (RELPAX) 20 MG tablet Take 1-2 tablets (20-40 mg) by mouth at onset of headache for migraine (may repeat 20-40 mg  in 2 hours as needed. Max 80 mg in 24 hours) 18 tablet 3     galcanezumab-gnlm (EMGALITY) 120 MG/ML injection Inject 240 mg subcutaneous first month and then inject 120 mg subcutaneous every 28 days 2 mL 11     ketorolac (TORADOL) 30 MG/ML injection INJECT 1ML INTO THE MUSCLE AS NEEDED FOR MODERATE PAIN. MAX OF 1 INJECTION PER WEEK. 4 mL 0     Needle, Disp, (BD ECLIPSE NEEDLE) 25G X 5/8\" MISC 1 Syringe daily as needed 25 each 1     ondansetron (ZOFRAN-ODT) 4 MG ODT tab Take 1 tablet (4 mg) by mouth every 8 " hours as needed for nausea or vomiting 20 tablet 3     propranolol (INDERAL) 60 MG tablet Take 60 mg by mouth       rizatriptan (MAXALT-MLT) 10 MG ODT Take 1 tablet (10 mg) by mouth at onset of headache for migraine May repeat in 2 hours. Max 3 tablets/24 hours. 9 tablet 1     sertraline (ZOLOFT) 100 MG tablet Take 2 tablets (200 mg) by mouth daily 180 tablet 1     Syringe, Disposable, (SYRINGE LUER LOCK) 3 ML MISC 1 Syringe daily as needed 25 each 1   reviewed and verified with the patient    Review of Systems:   A 10-point ROS including constitutional, eyes, respiratory, cardiovascular, gastroenterology, genitourinary, integumentary, musculoskeletal, neurology and psychiatric were all negative except as mentioned in the interval history.      General Exam:   /75   Pulse 102   SpO2 100%   GEN: Awake, NAD; good eye contact, responses appropriately , healthy apearingSpeech is fluent without dysarthria.Left TMD Clicking.  Mentation appears normal, affect normal/bright, judgement and insight intact, normal speech and appearance well-groomed.  HEENT: Head atraumatic/Normocephalic. Scalp normal. Pupils equally round, 4 mm, reactive to light and accommodation, sclera and conjunctiva normal. Fundoscopic examination reveals normal vessels no papilledema. Neck: Easily moveable without resistance  Speech is fluent without dysarthria. EOM intact. There is no nystagmus. Has conjugated gaze. Face is symmetrical. Intact and symmetrical eyebrow and lid raise and eyelid closure, smiles. Hearing Intact to conversation speech. The palates elevates symmetrical. The tongue protrudes midline with no atrophy or fasciculations. Strength  5/5 in the upper and lower extremities bilaterally. Sensation is intact to  touch throughout.  Reflexes symmetrical at biceps, triceps, brachioradialis, patellar, and Achilles. Normal casual gait.  Assessment and Plan:  As discussed above      Prescription flexeril refill and eletriptan provided.  Correct use and course provided. Expected benefits and typical side effects reviewed. Safety of concomitant medications and interactions reviewed. Patient taught signs and symptoms of adverse reactions and allergies. Patient understands teaching and accepts risks of prescribed medication regimen.    I discussed all my recommendation with Jemima Augustine. The patient verbalizes understanding and comfortable with the plan. The patient has our clinic phone number to call with any questions or concerns. All of the patient's questions were answered from the best of my current knowledge.   Time spent with pt answering questions, discussing findings, counseling and coordinating care was more than 50% the appointment time, 31  minutes.         HERMES Gonzalez, CNP  [facility]

## 2020-09-10 NOTE — LETTER
9/10/2020     RE: Jemima Augustine  [address] [address]     Dear Colleague,    Thank you for referring your patient, Jemima Augustine, to the [facility] NEUROLOGY at [facility]. Please see a copy of my visit note below.    Re: Jemima Augustine      MRN# [id number]  YOB: 1995  Date of Visit:9/10/2020     OUTPATIENT NEUROLOGY VISIT NOTE    Reason for Visit:  Headache follow-up    Interval History:  Jemima Augustine is a 25-year-old female presents to the [facility] headache clinic today for headache follow-up.  Initial Headache Clinic visit on 2/17/2020, see note for details  Patient has been seeing Dr Diaz for spells.   Patient reports that she has EEG scheduled and will follow up with Dr Diaz after the EEG for next step.   History of headaches all patient's life with severe headaches in high school reports that at initial headache clinic visit.  Patient has been seen at [facility] neurological clinic in the past.  Patient was not started on any preventative treatment at the time of her initial visit.   Emgality started end of June and has been receiving Emgality injections for 3 month and due forth injection end of September. No side effects. Recently headaches are less in the past couple of weeks. Patient reports that she weaned off amitriptyline and no difference. Patient does not think amitriptyline helped but reports that anxiety may be more prevalant.   Patient reports that eletriptan has been helpful when headaches are bad and no side effects.   Patient is still on propranolol helps with anxiety but cannot increase because of fainting spells.   Patient has been on sertraline for 6 months and haven't helped.   Patient would like to eliminate some of her medications because planning on starting family.   Muscle spasms once in while lasting for a 5 minutes and annoying. Patient would like a refill of flexeril for muscle  spasms and severe headache as needed. Reviewed side effects.   Patient will notify us if planning to get pregnant to stop Emgality due to unknown side effects and unknown adverse effects in pregnancy or breast-feeding.     Headache treatment plan discussed with the patient:  Migraine headache prevention-continue Emgality   Acute migraine headache treatment -eletriptan as needed   Cyclobenzaprine as needed refilled  Increased anxiety-follow up with PCP for options to review  Follow up in 3 months or sooner if needed-video     [facility] concerns-[facility] referral      Past Medical History reviewed   Social History     Tobacco Use     Smoking status: Never Smoker     Smokeless tobacco: Never Used   Substance Use Topics     Alcohol use: Never     Frequency: Never    reviewed and verified with the patient     Allergies   Allergen Reactions     Lisdexamfetamine Other (See Comments)       Current Outpatient Medications   Medication Sig Dispense Refill     albuterol (PROAIR HFA/PROVENTIL HFA/VENTOLIN HFA) 108 (90 Base) MCG/ACT inhaler Inhale 1 puff into the lungs daily       SUMAtriptan (IMITREX) 100 MG tablet Take 100 mg by mouth daily       amitriptyline (ELAVIL) 25 MG tablet Take 1 tablet (25 mg) by mouth At Bedtime 30 tablet 3     amphetamine-dextroamphetamine (ADDERALL XR) 15 MG 24 hr capsule Take 1 capsule (15 mg) by mouth daily 30 capsule 0     amphetamine-dextroamphetamine (ADDERALL) 5 MG tablet Take 1 tablet (5 mg) by mouth daily as needed 30 tablet 0     cyclobenzaprine (FLEXERIL) 10 MG tablet TAKE ONE TABLET BY MOUTH THREE TIMES A DAY AS NEEDED FOR MUSCLE SPASMS 30 tablet 0     eletriptan (RELPAX) 20 MG tablet Take 1-2 tablets (20-40 mg) by mouth at onset of headache for migraine (may repeat 20-40 mg  in 2 hours as needed. Max 80 mg in 24 hours) 18 tablet 3     galcanezumab-gnlm (EMGALITY) 120 MG/ML injection Inject 240 mg subcutaneous first month and then inject 120 mg subcutaneous every 28 days 2 mL 11      "ketorolac (TORADOL) 30 MG/ML injection INJECT 1ML INTO THE MUSCLE AS NEEDED FOR MODERATE PAIN. MAX OF 1 INJECTION PER WEEK. 4 mL 0     Needle, Disp, (BD ECLIPSE NEEDLE) 25G X 5/8\" MISC 1 Syringe daily as needed 25 each 1     ondansetron (ZOFRAN-ODT) 4 MG ODT tab Take 1 tablet (4 mg) by mouth every 8 hours as needed for nausea or vomiting 20 tablet 3     propranolol (INDERAL) 60 MG tablet Take 60 mg by mouth       rizatriptan (MAXALT-MLT) 10 MG ODT Take 1 tablet (10 mg) by mouth at onset of headache for migraine May repeat in 2 hours. Max 3 tablets/24 hours. 9 tablet 1     sertraline (ZOLOFT) 100 MG tablet Take 2 tablets (200 mg) by mouth daily 180 tablet 1     Syringe, Disposable, (SYRINGE LUER LOCK) 3 ML MISC 1 Syringe daily as needed 25 each 1   reviewed and verified with the patient    Review of Systems:   A 10-point ROS including constitutional, eyes, respiratory, cardiovascular, gastroenterology, genitourinary, integumentary, musculoskeletal, neurology and psychiatric were all negative except as mentioned in the interval history.      General Exam:   /75   Pulse 102   SpO2 100%   GEN: Awake, NAD; good eye contact, responses appropriately , healthy apearingSpeech is fluent without dysarthria.Left TMD Clicking.  Mentation appears normal, affect normal/bright, judgement and insight intact, normal speech and appearance well-groomed.  HEENT: Head atraumatic/Normocephalic. Scalp normal. Pupils equally round, 4 mm, reactive to light and accommodation, sclera and conjunctiva normal. Fundoscopic examination reveals normal vessels no papilledema. Neck: Easily moveable without resistance  Speech is fluent without dysarthria. EOM intact. There is no nystagmus. Has conjugated gaze. Face is symmetrical. Intact and symmetrical eyebrow and lid raise and eyelid closure, smiles. Hearing Intact to conversation speech. The palates elevates symmetrical. The tongue protrudes midline with no atrophy or fasciculations. " Strength  5/5 in the upper and lower extremities bilaterally. Sensation is intact to  touch throughout.  Reflexes symmetrical at biceps, triceps, brachioradialis, patellar, and Achilles. Normal casual gait.  Assessment and Plan:  As discussed above      Prescription flexeril refill and eletriptan provided. Correct use and course provided. Expected benefits and typical side effects reviewed. Safety of concomitant medications and interactions reviewed. Patient taught signs and symptoms of adverse reactions and allergies. Patient understands teaching and accepts risks of prescribed medication regimen.    I discussed all my recommendation with Jemima Augustine. The patient verbalizes understanding and comfortable with the plan. The patient has our clinic phone number to call with any questions or concerns. All of the patient's questions were answered from the best of my current knowledge.   Time spent with pt answering questions, discussing findings, counseling and coordinating care was more than 50% the appointment time, 31  minutes.       HERMES Gonzalez, CNP  [facility]

## 2020-11-09 ENCOUNTER — VIRTUAL VISIT (OUTPATIENT)
Dept: NEUROLOGY | Facility: CLINIC | Age: 25
End: 2020-11-09
Payer: COMMERCIAL

## 2020-11-09 DIAGNOSIS — Z53.9 ERRONEOUS ENCOUNTER--DISREGARD: Primary | ICD-10-CM

## 2020-11-09 NOTE — PROGRESS NOTES
"Jemima Augustine is a 25 year old female who is being evaluated via a billable video visit.      The patient has been notified of following:     \"This video visit will be conducted via a call between you and your physician/provider. We have found that certain health care needs can be provided without the need for an in-person physical exam.  This service lets us provide the care you need with a video conversation.  If a prescription is necessary we can send it directly to your pharmacy.  If lab work is needed we can place an order for that and you can then stop by our lab to have the test done at a later time.    Video visits are billed at different rates depending on your insurance coverage.  Please reach out to your insurance provider with any questions.    If during the course of the call the physician/provider feels a video visit is not appropriate, you will not be charged for this service.\"    Patient has given verbal consent for Video visit? Yes  How would you like to obtain your AVS? MyChart  If you are dropped from the video visit, the video invite should be resent to: Send to e-mail at: [email or web address]  Will anyone else be joining your video visit? No    Rojelio Malagon      Called a left a VM. Link was sent via [employer]. Dory -technical problems with camera.       This encounter was opened in error. Please disregard.  "

## 2020-11-09 NOTE — LETTER
"11/9/2020       RE: Jemima Augustine  [address] [place] [id number]     Dear Colleague,    Thank you for referring your patient, Jemima Augustine, to the [facility]UROLOG[facility] [place] at [facility]. Please see a copy of my visit note below.    Jemima Augustine is a 25 year old female who is being evaluated via a billable video visit.      The patient has been notified of following:     \"This video visit will be conducted via a call between you and your physician/provider. We have found that certain health care needs can be provided without the need for an in-person physical exam.  This service lets us provide the care you need with a video conversation.  If a prescription is necessary we can send it directly to your pharmacy.  If lab work is needed we can place an order for that and you can then stop by our lab to have the test done at a later time.    Video visits are billed at different rates depending on your insurance coverage.  Please reach out to your insurance provider with any questions.    If during the course of the call the physician/provider feels a video visit is not appropriate, you will not be charged for this service.\"    Patient has given verbal consent for Video visit? Yes  How would you like to obtain your AVS? MyChart  If you are dropped from the video visit, the video invite should be resent to: Send to e-mail at: [email or web address]  Will anyone else be joining your video visit? No    Rojelio Malagon      Called a left a VM. Link was sent via [employer]. Amwell -technical problems with camera.       This encounter was opened in error. Please disregard.      Again, thank you for allowing me to participate in the care of your patient.      Sincerely,    HERMES Doherty CNP      "

## 2020-11-10 ENCOUNTER — VIRTUAL VISIT (OUTPATIENT)
Dept: FAMILY MEDICINE | Facility: CLINIC | Age: 25
End: 2020-11-10
Payer: COMMERCIAL

## 2020-11-10 DIAGNOSIS — F90.0 ADHD (ATTENTION DEFICIT HYPERACTIVITY DISORDER), INATTENTIVE TYPE: ICD-10-CM

## 2020-11-10 DIAGNOSIS — F41.9 ANXIETY: Primary | ICD-10-CM

## 2020-11-10 PROCEDURE — 99214 OFFICE O/P EST MOD 30 MIN: CPT | Mod: 95 | Performed by: NURSE PRACTITIONER

## 2020-11-10 RX ORDER — DEXTROAMPHETAMINE SACCHARATE, AMPHETAMINE ASPARTATE, DEXTROAMPHETAMINE SULFATE AND AMPHETAMINE SULFATE 1.25; 1.25; 1.25; 1.25 MG/1; MG/1; MG/1; MG/1
5 TABLET ORAL DAILY PRN
Qty: 30 TABLET | Refills: 0 | Status: SHIPPED | OUTPATIENT
Start: 2020-11-10 | End: 2020-12-11

## 2020-11-10 RX ORDER — DEXTROAMPHETAMINE SACCHARATE, AMPHETAMINE ASPARTATE MONOHYDRATE, DEXTROAMPHETAMINE SULFATE AND AMPHETAMINE SULFATE 3.75; 3.75; 3.75; 3.75 MG/1; MG/1; MG/1; MG/1
15 CAPSULE, EXTENDED RELEASE ORAL DAILY
Qty: 30 CAPSULE | Refills: 0 | Status: SHIPPED | OUTPATIENT
Start: 2020-11-10 | End: 2020-12-11

## 2020-11-10 RX ORDER — CITALOPRAM HYDROBROMIDE 10 MG/1
10 TABLET ORAL DAILY
Qty: 30 TABLET | Refills: 1 | Status: SHIPPED | OUTPATIENT
Start: 2020-11-10 | End: 2021-01-05

## 2020-11-10 ASSESSMENT — ANXIETY QUESTIONNAIRES
6. BECOMING EASILY ANNOYED OR IRRITABLE: NOT AT ALL
5. BEING SO RESTLESS THAT IT IS HARD TO SIT STILL: NOT AT ALL
GAD7 TOTAL SCORE: 7
1. FEELING NERVOUS, ANXIOUS, OR ON EDGE: SEVERAL DAYS
7. FEELING AFRAID AS IF SOMETHING AWFUL MIGHT HAPPEN: SEVERAL DAYS
IF YOU CHECKED OFF ANY PROBLEMS ON THIS QUESTIONNAIRE, HOW DIFFICULT HAVE THESE PROBLEMS MADE IT FOR YOU TO DO YOUR WORK, TAKE CARE OF THINGS AT HOME, OR GET ALONG WITH OTHER PEOPLE: SOMEWHAT DIFFICULT
3. WORRYING TOO MUCH ABOUT DIFFERENT THINGS: MORE THAN HALF THE DAYS
2. NOT BEING ABLE TO STOP OR CONTROL WORRYING: MORE THAN HALF THE DAYS

## 2020-11-10 ASSESSMENT — PATIENT HEALTH QUESTIONNAIRE - PHQ9
5. POOR APPETITE OR OVEREATING: SEVERAL DAYS
SUM OF ALL RESPONSES TO PHQ QUESTIONS 1-9: 3

## 2020-11-10 NOTE — PROGRESS NOTES
"Jemima Augustine is a 25 year old female who is being evaluated via a billable video visit.      The patient has been notified of following:     \"This video visit will be conducted via a call between you and your physician/provider. We have found that certain health care needs can be provided without the need for an in-person physical exam.  This service lets us provide the care you need with a video conversation.  If a prescription is necessary we can send it directly to your pharmacy.  If lab work is needed we can place an order for that and you can then stop by our lab to have the test done at a later time.    Video visits are billed at different rates depending on your insurance coverage.  Please reach out to your insurance provider with any questions.    If during the course of the call the physician/provider feels a video visit is not appropriate, you will not be charged for this service.\"    Patient has given verbal consent for Video visit? Yes  How would you like to obtain your AVS? MyChart  If you are dropped from the video visit, the video invite should be resent to: Text to cell phone: .  Will anyone else be joining your video visit? No    Subjective     Jemima Augustine is a 25 year old female who presents today via video visit for the following health issues:    HPI     Anxiety Follow-Up    How are you doing with your anxiety since your last visit? No change- due to not seeing effectiveness with zoloft, will taper off and try celexa for anxiety    Are you having other symptoms that might be associated with anxiety? No    Have you had a significant life event? No     Are you feeling depressed? No    Do you have any concerns with your use of alcohol or other drugs? No     She is out of ADHD medicine.  Sometimes she does not feel that it is helping her ADHD, but then she goes without and she realizes that it was helping after all.     Social History     Tobacco Use     Smoking status: Never " Smoker     Smokeless tobacco: Never Used   Substance Use Topics     Alcohol use: Never     Frequency: Never     Drug use: Never     BEBO-7 SCORE 2/4/2020 3/12/2020 11/10/2020   Total Score 17 10 7     PHQ 2/4/2020 3/12/2020 11/10/2020   PHQ-9 Total Score - 4 3   Q9: Thoughts of better off dead/self-harm past 2 weeks Not at all Not at all Not at all     Last PHQ-9 11/10/2020   1.  Little interest or pleasure in doing things 0   2.  Feeling down, depressed, or hopeless 0   3.  Trouble falling or staying asleep, or sleeping too much 1   4.  Feeling tired or having little energy 1   5.  Poor appetite or overeating 0   6.  Feeling bad about yourself 0   7.  Trouble concentrating 1   8.  Moving slowly or restless 0   Q9: Thoughts of better off dead/self-harm past 2 weeks 0   PHQ-9 Total Score 3   Difficulty at work, home, or with people Somewhat difficult     BEBO-7  11/10/2020   1. Feeling nervous, anxious, or on edge 1   2. Not being able to stop or control worrying 2   3. Worrying too much about different things 2   4. Trouble relaxing 1   5. Being so restless that it is hard to sit still 0   6. Becoming easily annoyed or irritable 0   7. Feeling afraid, as if something awful might happen 1   BEBO-7 Total Score 7   If you checked any problems, how difficult have they made it for you to do your work, take care of things at home, or get along with other people? Somewhat difficult         How many servings of fruits and vegetables do you eat daily?  2-3    On average, how many sweetened beverages do you drink each day (Examples: soda, juice, sweet tea, etc.  Do NOT count diet or artificially sweetened beverages)?   1    How many days per week do you exercise enough to make your heart beat faster? 3 or less    How many minutes a day do you exercise enough to make your heart beat faster? 9 or less    How many days per week do you miss taking your medication? 0      Video Start Time: 0445    Review of Systems   Constitutional,  "HEENT, cardiovascular, pulmonary, GI, , musculoskeletal, neuro, skin, endocrine and psych systems are negative, except as otherwise noted.      Objective           Vitals:  No vitals were obtained today due to virtual visit.    Physical Exam     GENERAL: Healthy, alert and no distress  EYES: Eyes grossly normal to inspection.  No discharge or erythema, or obvious scleral/conjunctival abnormalities.  RESP: No audible wheeze, cough, or visible cyanosis.  No visible retractions or increased work of breathing.    SKIN: Visible skin clear. No significant rash, abnormal pigmentation or lesions.  NEURO: Cranial nerves grossly intact.  Mentation and speech appropriate for age.  PSYCH: Mentation appears normal, affect normal/bright, judgement and insight intact, normal speech and appearance well-groomed.          Assessment & Plan     Jemima was seen today for depression.    Diagnoses and all orders for this visit:    Anxiety  -     citalopram (CELEXA) 10 MG tablet; Take 1 tablet (10 mg) by mouth daily    ADHD (attention deficit hyperactivity disorder), inattentive type  -     amphetamine-dextroamphetamine (ADDERALL XR) 15 MG 24 hr capsule; Take 1 capsule (15 mg) by mouth daily  -     amphetamine-dextroamphetamine (ADDERALL) 5 MG tablet; Take 1 tablet (5 mg) by mouth daily as needed         BMI:   Estimated body mass index is 26.39 kg/m  as calculated from the following:    Height as of 2/17/20: 1.6 m (5' 3\").    Weight as of 3/12/20: 67.6 kg (149 lb).   Weight management plan: Discussed healthy diet and exercise guidelines         See Patient Instructions: Take one pill of zoloft daily for one week and then stop. Start citalopram today. Let me know how you are feeling over the next few weeks; sooner if you are feeling worse at any time.     Return in about 4 weeks (around 12/8/2020), or if symptoms worsen or fail to improve.    SHAYY Giang  [facility]      Video-Visit Details    Type of " service:  Video Visit    Video End Time:4:59 PM    Originating Location (pt. Location): Home    Distant Location (provider location):  [facility]     Platform used for Video Visit: Allyssa

## 2020-11-10 NOTE — PATIENT INSTRUCTIONS
Take one pill of zoloft daily for one week and then stop. Start citalopram today. Let me know how you are feeling over the next few weeks; sooner if you are feeling worse at any time.   Patient Education     Carpal Tunnel Syndrome Prevention Tips  Carpal tunnel syndrome is a painful condition in the hand and wrist. It occurs when there is too much pressure on the median nerve at the wrist. The median nerve runs from your forearm to the palm of your hand. It may get squeezed or pressed when it passes through the carpal tunnel from your wrist to your hand. You may then feel numbness, tingling, pain, or weakness in your hand and up your forearm.  Doing the same hand activities over and over can put you at higher risk for carpal tunnel syndrome. But you can reduce your risk. Learn how to change the way you use your hands. Below are tips for at home and on the job. Also follow the hand and wrist safety policies at your workplace.      Keep your wrist in a straight (neutral) position when exercising.      Keep your wrist in neutral  Keep a straight (neutral) wrist position as often as you can. Don t use your wrist in a bent (flexed) position for long periods of time. This includes extended or twisted positions.  When you sleep, don't have your wrist flexed (don't sleep all curled up on your side). And don't put extra pressure on your wrist for long periods of time (don't sleep on your stomach with your hands under you).  Watch your   Don t use only your thumb and index finger to grasp or lift something. This can put stress on your wrist. When you can, use your whole hand and all its fingers to grasp an object.  Minimize repetition  Don t move your arms or hands the same way for long periods of time. And don't hold an object in the same way for long periods of time. Even simple, light tasks can cause injury this way. Instead, switch tasks or switch hands.  Rest your hands  Give your hands a break from time to time with  a rest. Even a few minutes once an hour can help.  Reduce speed and force  Slow down when you do a forceful, repetitive motion. This gives your wrist time to recover from the effort. Use power tools to help reduce the force.  Strengthen the muscles  Weak muscles may lead to a poor wrist or arm position. Exercises will make your hand and arm muscles stronger. This can help you keep a better position.  [employer] last reviewed this educational content on 1/1/2018 2000-2020 [employer]. [address] [place] [address]. All rights reserved. This information is not intended as a substitute for professional medical care. Always follow your healthcare professional's instructions.           Patient Education     Understanding Carpal Tunnel Syndrome    The carpal tunnel is a narrow space inside the wrist. It is ringed by bone and a band of tough tissue called the transverse carpal ligament. A major nerve called the median nerve runs from the forearm into the hand through the carpal tunnel. Tendons also run through the carpal tunnel.  With carpal tunnel syndrome, the tendons or nearby tissues within the carpal tunnel may swell or thicken. Or the transverse carpal ligament may harden and shorten. This narrows the space in the carpal tunnel and puts pressure on the median nerve. This pressure leads to tingling and numbness of the hand and wrist. In time, the condition can make even simple tasks hard to do.  What causes carpal tunnel syndrome?  Doctors aren t entirely clear why the condition occurs. Certain things may make a person more likely to have it. These include:    Being female    Being pregnant    Being overweight    Having diabetes or rheumatoid arthritis  Symptoms of carpal tunnel syndrome  Symptoms often come and go. At first, symptoms may occur mainly at night. Later, they may be noticed during the day as well. They may get worse with activities such as driving, reading, typing, or holding a  phone. Symptoms can include:    Tingling and numbness in the hand or wrist    Sharp pain that shoots up the arm or down to the fingers    Hand stiffness or cramping, especially in the morning    Trouble making a fist    Hand weakness and clumsiness  Treatment for carpal tunnel syndrome  Certain treatments help reduce the pressure on the median nerve and relieve symptoms. Choices for treatment may include one or more of the following:    Wrist splint. This involves wearing a special brace on the wrist and hand. The splint holds the wrist straight, in a neutral position. This helps keep the carpal tunnel as open as possible.    Cortisone shots. Cortisone is a medicine that helps reduce swelling. It is injected directly into the wrist. It helps shrink tissues inside the carpal tunnel. This relieves symptoms for a time.    Pain medicines. You may take over-the-counter or prescription medicines to help reduce swelling and relieve symptoms.    Surgery. If the condition doesn t respond to other treatments and doesn t go away on its own, you may need surgery. During surgery, the surgeon cuts the transverse carpal ligament to relieve pressure on the median nerve.     When to call your healthcare provider  Call your healthcare provider right away if you have any of these:    Fever of 100.4 F (38 C) or higher, or as directed    Symptoms that don t get better, or get worse    New symptoms   John last reviewed this educational content on 3/10/2016    3676-9844 [employer]. [address] [place] [address], [place] [address]. All rights reserved. This information is not intended as a substitute for professional medical care. Always follow your healthcare professional's instructions.

## 2020-11-11 ASSESSMENT — ANXIETY QUESTIONNAIRES: GAD7 TOTAL SCORE: 7

## 2020-12-11 ENCOUNTER — MYC REFILL (OUTPATIENT)
Dept: FAMILY MEDICINE | Facility: CLINIC | Age: 25
End: 2020-12-11

## 2020-12-11 ENCOUNTER — MYC MEDICAL ADVICE (OUTPATIENT)
Dept: FAMILY MEDICINE | Facility: CLINIC | Age: 25
End: 2020-12-11

## 2020-12-11 DIAGNOSIS — F90.0 ADHD (ATTENTION DEFICIT HYPERACTIVITY DISORDER), INATTENTIVE TYPE: ICD-10-CM

## 2020-12-11 DIAGNOSIS — F41.9 ANXIETY: Primary | ICD-10-CM

## 2020-12-11 DIAGNOSIS — F41.9 ANXIETY: ICD-10-CM

## 2020-12-11 RX ORDER — PROPRANOLOL HYDROCHLORIDE 60 MG/1
60 TABLET ORAL AT BEDTIME
Qty: 30 TABLET | Refills: 1 | Status: SHIPPED | OUTPATIENT
Start: 2020-12-11 | End: 2021-02-26

## 2020-12-11 RX ORDER — PROPRANOLOL HYDROCHLORIDE 60 MG/1
60 TABLET ORAL AT BEDTIME
Qty: 30 TABLET | Refills: 1 | Status: CANCELLED | OUTPATIENT
Start: 2020-12-11

## 2020-12-11 NOTE — TELEPHONE ENCOUNTER
Routing refill request to provider for review/approval because:  Medication is reported/historical.  Pended for approval

## 2020-12-14 ENCOUNTER — VIRTUAL VISIT (OUTPATIENT)
Dept: NEUROLOGY | Facility: CLINIC | Age: 25
End: 2020-12-14
Payer: COMMERCIAL

## 2020-12-14 DIAGNOSIS — Z53.9 ERRONEOUS ENCOUNTER--DISREGARD: Primary | ICD-10-CM

## 2020-12-14 NOTE — TELEPHONE ENCOUNTER
Routing refill request to provider for review/approval because:  Drug not on the [facility] refill protocol     amphetamine-dextroamphetamine (ADDERALL XR) 15 MG 24 hr capsule  Last Written Prescription Date:  11/10/20  Last Fill Quantity: 30,  # refills: 0   Last office visit: 3/12/2020 with prescribing provider:  RAVINDER Hernandez on 11/10/20   Future Office Visit:      amphetamine-dextroamphetamine (ADDERALL) 5 MG tablet  Last Written Prescription Date:  11/10/20  Last Fill Quantity: 30,  # refills: 0   Last office visit: 3/12/2020 with prescribing provider:  RAVINDER Hernandez on 11/10/20   Future Office Visit:

## 2020-12-14 NOTE — TELEPHONE ENCOUNTER
Duplicate refill request for Inderal 60 mg. Sent to preferred pharmacy on 12/11/20.     Izzy Oviedo RN BSN

## 2020-12-14 NOTE — PROGRESS NOTES
Scheduled follow up visit today. Called patient and left a VM.   This encounter was opened in error. Please disregard.

## 2020-12-14 NOTE — LETTER
12/14/2020       RE: Jemima Augustine  [address] [facility] [address]     Dear Colleague,    Thank you for referring your patient, Jemima Augustine, to the [facility]UROLOG[facility]IN[facility] [place] at [facility]. Please see a copy of my visit note below.    Scheduled follow up visit today. Called patient and left a VM.   This encounter was opened in error. Please disregard.      Again, thank you for allowing me to participate in the care of your patient.      Sincerely,    HERMES Doherty CNP

## 2020-12-15 RX ORDER — DEXTROAMPHETAMINE SACCHARATE, AMPHETAMINE ASPARTATE MONOHYDRATE, DEXTROAMPHETAMINE SULFATE AND AMPHETAMINE SULFATE 3.75; 3.75; 3.75; 3.75 MG/1; MG/1; MG/1; MG/1
15 CAPSULE, EXTENDED RELEASE ORAL DAILY
Qty: 30 CAPSULE | Refills: 0 | Status: SHIPPED | OUTPATIENT
Start: 2020-12-15 | End: 2021-01-05

## 2020-12-15 RX ORDER — DEXTROAMPHETAMINE SACCHARATE, AMPHETAMINE ASPARTATE, DEXTROAMPHETAMINE SULFATE AND AMPHETAMINE SULFATE 1.25; 1.25; 1.25; 1.25 MG/1; MG/1; MG/1; MG/1
5 TABLET ORAL DAILY PRN
Qty: 30 TABLET | Refills: 0 | Status: SHIPPED | OUTPATIENT
Start: 2020-12-15 | End: 2021-01-05

## 2020-12-30 NOTE — PROGRESS NOTES
Subjective     Jemima Augustine is a 25 year old female who presents to clinic today for the following health issues:    HPI         Medication Followup of  Adderall     Taking Medication as prescribed: yes    Side Effects:  None    Medication Helping Symptoms:  Yes    Discussed if considering pregnancy, may want to consider reducing or getting off of her ADHD medication.  Discussed that some pregnant women choose to stay on the medication during pregnancy due to severity of symptoms, but there are risks.           Anxiety Follow-Up    How are you doing with your anxiety since your last visit? No change- she does not feel celexa or zoloft has helped so far, she is interested in trying a new antidepressant. She is not sleeping at night due to celexa.    Are you having other symptoms that might be associated with anxiety? Yes:  worrying     Have you had a significant life event? No     Are you feeling depressed? No    Do you have any concerns with your use of alcohol or other drugs? No     She reprots she has a hx of TMJ- was going to [facility] but was dropped as a patient d/t missed appointments. She also reports a clicking pain in her right wrist, she had an xray in [place] and was told her ulna is short causing the symptoms.  She also reports poor posture/ upper back and they are worried this could worsen when pregnant; also she suffers from migraines.  Discussed how chiropractor/ physical therapy could help with posture/back alignment; which might also help her migraines; and TMJ; TMJ can also be treated in PT chich she has not tried. Pt reported she went to neurology for her migraines and was recently prescribed a bunch of medications, but she has not started them, as this provider has seen her several times and always acts like she doesn't know her or what plans they have made or done in the past.  Discussed how she might not want to start some of them if thinking about pregnancy.  She reports she has not tried  topamax in the past for her headaches, nor chiropractor or massage.     Social History     Tobacco Use     Smoking status: Never Smoker     Smokeless tobacco: Never Used   Substance Use Topics     Alcohol use: Never     Frequency: Never     Drug use: Never     BEBO-7 SCORE 3/12/2020 11/10/2020 1/5/2021   Total Score 10 7 10     PHQ 3/12/2020 11/10/2020 1/5/2021   PHQ-9 Total Score 4 3 8   Q9: Thoughts of better off dead/self-harm past 2 weeks Not at all Not at all Not at all     Last PHQ-9 1/5/2021   1.  Little interest or pleasure in doing things 0   2.  Feeling down, depressed, or hopeless 0   3.  Trouble falling or staying asleep, or sleeping too much 2   4.  Feeling tired or having little energy 2   5.  Poor appetite or overeating 1   6.  Feeling bad about yourself 1   7.  Trouble concentrating 2   8.  Moving slowly or restless 0   Q9: Thoughts of better off dead/self-harm past 2 weeks 0   PHQ-9 Total Score 8   Difficulty at work, home, or with people -     BEBO-7  1/5/2021   1. Feeling nervous, anxious, or on edge 2   2. Not being able to stop or control worrying 2   3. Worrying too much about different things 2   4. Trouble relaxing 2   5. Being so restless that it is hard to sit still 1   6. Becoming easily annoyed or irritable 1   7. Feeling afraid, as if something awful might happen 0   BEBO-7 Total Score 10   If you checked any problems, how difficult have they made it for you to do your work, take care of things at home, or get along with other people? -         How many servings of fruits and vegetables do you eat daily?  1-2    On average, how many sweetened beverages do you drink each day (Examples: soda, juice, sweet tea, etc.  Do NOT count diet or artificially sweetened beverages)?   0    How many days per week do you exercise enough to make your heart beat faster? 3 or less    How many minutes a day do you exercise enough to make your heart beat faster? 20 - 29    How many days per week do you miss  "taking your medication? 0      Currently has IUD- would like to discuss having this removed.  Discussed can remove at any time.  Pt has wedding planned for June 2021 and they are wanting to try to conceive as soon as possible after that.    Review of Systems   Constitutional, HEENT, cardiovascular, pulmonary, GI, , musculoskeletal, neuro, skin, endocrine and psych systems are negative, except as otherwise noted.      Objective    /78   Pulse 76   Temp 98.1  F (36.7  C) (Tympanic)   Resp 16   Ht 1.6 m (5' 3\")   Wt 66.7 kg (147 lb)   SpO2 98%   Breastfeeding No   BMI 26.04 kg/m    Body mass index is 26.04 kg/m .  Physical Exam   GENERAL: healthy, alert and no distress  RESP: lungs clear to auscultation - no rales, rhonchi or wheezes  CV: regular rate and rhythm, normal S1 S2, no S3 or S4, no murmur, click or rub, no peripheral edema and peripheral pulses strong  MS: no gross musculoskeletal defects noted, no edema, spine appears normally aligned.  PSYCH: mentation appears normal, affect normal/bright          Assessment & Plan     Jemima was seen today for recheck medication.    Diagnoses and all orders for this visit:    Adjustment disorder with mixed anxiety and depressed mood  -     escitalopram (LEXAPRO) 10 MG tablet; Take 1 tablet (10 mg) by mouth daily    Anxiety  -     Discontinue: citalopram (CELEXA) 10 MG tablet; Take 1 tablet (10 mg) by mouth daily    ADHD (attention deficit hyperactivity disorder), inattentive type  -     amphetamine-dextroamphetamine (ADDERALL XR) 15 MG 24 hr capsule; Take 1 capsule (15 mg) by mouth daily  -     amphetamine-dextroamphetamine (ADDERALL XR) 15 MG 24 hr capsule; Take 1 capsule (15 mg) by mouth daily  -     amphetamine-dextroamphetamine (ADDERALL XR) 15 MG 24 hr capsule; Take 1 capsule (15 mg) by mouth daily  -     amphetamine-dextroamphetamine (ADDERALL) 5 MG tablet; Take 1 tablet (5 mg) by mouth daily In the afternoon as needed  -     " amphetamine-dextroamphetamine (ADDERALL) 5 MG tablet; Take 1 tablet (5 mg) by mouth daily In the afternoon as needed  -     amphetamine-dextroamphetamine (ADDERALL) 5 MG tablet; Take 1 tablet (5 mg) by mouth daily In the afternoon as needed    Screening examination for venereal disease  -     Neisseria gonorrhoeae PCR  -     Chlamydia trachomatis PCR    Needs flu shot  -     HC FLU VAC PRESRV FREE QUAD SPLIT VIR > 6 MONTHS IM  -     ADMIN 1st VACCINE    TMJ (temporomandibular joint syndrome)  -     CHIROPRACTOR NON-COVERED SER  -     PHYSICAL THERAPY REFERRAL; Future    Right wrist pain  -     Orthopedic & Spine  Referral; Future    Daily headache  -     CHIROPRACTOR NON-COVERED SER  -     PHYSICAL THERAPY REFERRAL; Future    Abnormal posture  -     CHIROPRACTOR NON-COVERED SER  -     PHYSICAL THERAPY REFERRAL; Future    Intractable migraine without aura and with status migrainosus    Adopted            See Patient Instructions: advised pt to let me know how medication change goes for her.  Schedule with PT/ chiropractor for TMJ, back issues and migraines. Schedule with orthopedics for wrist symptoms.     Return in about 4 weeks (around 2/2/2021), or if symptoms worsen or fail to improve, for medication check.    SHAYY Giang  [facility]

## 2021-01-04 ENCOUNTER — HEALTH MAINTENANCE LETTER (OUTPATIENT)
Age: 26
End: 2021-01-04

## 2021-01-04 ENCOUNTER — VIRTUAL VISIT (OUTPATIENT)
Dept: NEUROLOGY | Facility: CLINIC | Age: 26
End: 2021-01-04
Payer: COMMERCIAL

## 2021-01-04 DIAGNOSIS — G43.719 INTRACTABLE CHRONIC MIGRAINE WITHOUT AURA AND WITHOUT STATUS MIGRAINOSUS: Primary | ICD-10-CM

## 2021-01-04 DIAGNOSIS — M26.609 TMJ DYSFUNCTION: ICD-10-CM

## 2021-01-04 PROCEDURE — 99214 OFFICE O/P EST MOD 30 MIN: CPT | Mod: 95 | Performed by: NURSE PRACTITIONER

## 2021-01-04 RX ORDER — ONDANSETRON 4 MG/1
4 TABLET, ORALLY DISINTEGRATING ORAL EVERY 8 HOURS PRN
Qty: 20 TABLET | Refills: 1 | Status: SHIPPED | OUTPATIENT
Start: 2021-01-04 | End: 2021-01-21

## 2021-01-04 RX ORDER — GABAPENTIN 100 MG/1
CAPSULE ORAL
Qty: 60 CAPSULE | Refills: 3 | Status: SHIPPED | OUTPATIENT
Start: 2021-01-04 | End: 2021-01-21

## 2021-01-04 NOTE — PROGRESS NOTES
"Jemima Augustine is a 25 year old female who is being evaluated via a billable video visit.      How would you like to obtain your AVS? [employer]      Video Start Time: 4:27 PM    Subjective     Jemima Augustine is a 25 year old female who presents to clinic today for the following health issues -headache follow up     Last visit in September of 2020 and has been doing Emgality since June 2020 and currently  Patient takes eletriptan as needed and helps sometimes -50% of the time and helps more than rizatriptan or sumatriptan but still works only 50% of the time.   Migraine headache duration lasting a day and severe migraine headache frequency a couple per month.   Patient reports that Emgality helps with a severe migraine headaches but still has some little headaches a little more than half per month.   Patient takes naproxen or just waits until improves or turns into something worse. Milder headaches are 5-6/10 on the numeric pain scale and \"more like annoying\". Milder headaches in the temples.   Patient reports that she sleeps for a long time when she does not have school and sometimes cannot sleep because of the headache  Amitriptyline did not seem work so she stopped  Patient is on propranolol 60 mg ER for headaches and anxiety. Patient reports that was on 90 mg and was \"fainting\" with a blood pressure low. Has been on proranolol for years and initially helped but anxiety and headaches are not controlled.   Patient hasn't seen anyone except PCP.   Patient reports that she uses toradol injections one or twice per month  Reports that blood pressure is 100/70 in average.     TMD referral was given but patient reports that there was miscommunication paper work and patient messed up the date a second time and cancel patient as a client.       Plan discussed:  Continue Emgality for headache prevention  Gabapentin 100 mg at bedtime for one week, then take 200 mg at bedtime. Side effects-nausea, dizziness, " swelling  Anxiety control may help with headache prevention-discuss with PCP  Physical therapy for jaw, temples tension pain and TMJ- may be contributing to the headaches  Acute migraine headache treatment -a trial of Nurtec as needed for acute migraine headache treatment. Can be taken the same day with eletriptan. May cause nausea  Follow up in 6-8 weeks or sooner if needed     ROS:  CONSTITUTIONAL: NEGATIVE for fever, chills, change in weight  ENT/MOUTH: NEGATIVE for ear, mouth and throat problems  RESP: NEGATIVE for significant cough or SOB  CV: NEGATIVE for chest pain, palpitations or peripheral edema      Objective       Vitals:  No vitals were obtained today due to virtual visit.    Physical Exam   GENERAL: Healthy, alert and no distress, headache today at 5/10  EYES: Eyes grossly normal to inspection.   RESP: No audible wheeze, cough, or visible cyanosis.  No visible retractions or increased work of breathing.    SKIN: Visible skin clear.   NEURO: Cranial nerves grossly intact.  PSYCH: Mentation appears normal, affect normal/bright, judgement and insight intact, normal speech and appearance well-groomed.          Video-Visit Details    Type of service:  Video Visit    Video End Time:4:55 PM    Originating Location (pt. Location): Home    Distant Location (provider location):  [facility]OLOG[facility] [place]     Platform used for Video Visit: Marita Morgan, EMT

## 2021-01-04 NOTE — PATIENT INSTRUCTIONS
Plan:  Continue Emgality for headache prevention  Gabapentin 100 mg at bedtime for one week, then take 200 mg at bedtime. Side effects-nausea, dizziness, swelling  Anxiety control may help with headache prevention-discuss with PCP  Physical therapy for jaw, temples tension pain and TMJ- may be contributing to the headaches  Acute migraine headache treatment -a trial of Nurtec as needed for acute migraine headache treatment. Can be taken the same day with eletriptan  Follow up in 6-8 weeks or sooner if needed             Patient Education     Gabapentin capsules or tablets  Brand Names: Active-PAC with Gabapentin, Neurontin  What is this medicine?  GABAPENTIN (GA ba pen tin) is used to control seizures in certain types of epilepsy. It is also used to treat certain types of nerve pain.  How should I use this medicine?  Take this medicine by mouth with a glass of water. Follow the directions on the prescription label. You can take it with or without food. If it upsets your stomach, take it with food. Take your medicine at regular intervals. Do not take it more often than directed. Do not stop taking except on your doctor's advice.  If you are directed to break the 600 or 800 mg tablets in half as part of your dose, the extra half tablet should be used for the next dose. If you have not used the extra half tablet within 28 days, it should be thrown away.  A special MedGuide will be given to you by the pharmacist with each prescription and refill. Be sure to read this information carefully each time.  Talk to your pediatrician regarding the use of this medicine in children. While this drug may be prescribed for children as young as 3 years for selected conditions, precautions do apply.  What side effects may I notice from receiving this medicine?  Side effects that you should report to your doctor or health care professional as soon as possible:    allergic reactions like skin rash, itching or hives, swelling of the  face, lips, or tongue    breathing problems    suicidal thoughts, mood changes  Side effects that usually do not require medical attention (report to your doctor or health care professional if they continue or are bothersome):    dizziness    drowsiness    headache    nausea, vomiting    swelling of ankles, feet, hands    tiredness  What may interact with this medicine?  This medicine may interact with the following medications:    alcohol    antihistamines for allergy, cough, and cold    certain medicines for anxiety or sleep    certain medicines for depression like amitriptyline, fluoxetine, sertraline    certain medicines for seizures like phenobarbital, primidone    certain medicines for stomach problems    general anesthetics like halothane, isoflurane, methoxyflurane, propofol    local anesthetics like lidocaine, pramoxine, tetracaine    medicines that relax muscles for surgery    narcotic medicines for pain    phenothiazines like chlorpromazine, mesoridazine, prochlorperazine, thioridazine  What if I miss a dose?  If you miss a dose, take it as soon as you can. If it is almost time for your next dose, take only that dose. Do not take double or extra doses.  Where should I keep my medicine?  Keep out of reach of children.  This medicine may cause accidental overdose and death if it taken by other adults, children, or pets. Mix any unused medicine with a substance like cat litter or coffee grounds. Then throw the medicine away in a sealed container like a sealed bag or a coffee can with a lid. Do not use the medicine after the expiration date.  Store at room temperature between 15 and 30 degrees C (59 and 86 degrees F).  What should I tell my health care provider before I take this medicine?  They need to know if you have any of these conditions:    history of drug abuse or alcohol abuse problem    kidney disease    lung or breathing disease    suicidal thoughts, plans, or attempt; a previous suicide attempt by  you or a family member    an unusual or allergic reaction to gabapentin, other medicines, foods, dyes, or preservatives    pregnant or trying to get pregnant    breast-feeding  What should I watch for while using this medicine?  Visit your doctor or health care professional for regular checks on your progress. You may want to keep a record at home of how you feel your condition is responding to treatment. You may want to share this information with your doctor or health care professional at each visit. You should contact your doctor or health care professional if your seizures get worse or if you have any new types of seizures. Do not stop taking this medicine or any of your seizure medicines unless instructed by your doctor or health care professional. Stopping your medicine suddenly can increase your seizures or their severity.  Wear a medical identification bracelet or chain if you are taking this medicine for seizures, and carry a card that lists all your medications.  You may get drowsy, dizzy, or have blurred vision. Do not drive, use machinery, or do anything that needs mental alertness until you know how this medicine affects you. To reduce dizzy or fainting spells, do not sit or stand up quickly, especially if you are an older patient. Alcohol can increase drowsiness and dizziness. Avoid alcoholic drinks.  Your mouth may get dry. Chewing sugarless gum or sucking hard candy, and drinking plenty of water will help.  The use of this medicine may increase the chance of suicidal thoughts or actions. Pay special attention to how you are responding while on this medicine. Any worsening of mood, or thoughts of suicide or dying should be reported to your health care professional right away.  Women who become pregnant while using this medicine may enroll in the [place] Antiepileptic Drug Pregnancy Registry by calling 1-475.676.7886. This registry collects information about the safety of antiepileptic drug use  during pregnancy.  NOTE:This sheet is a summary. It may not cover all possible information. If you have questions about this medicine, talk to your doctor, pharmacist, or health care provider. Copyright  2020 [employer]

## 2021-01-04 NOTE — LETTER
"1/4/2021       RE: Jemima Augustine  [address] [place] [id number]     Dear Colleague,    Thank you for referring your patient, Jemima Augustine, to the [facility] [place] at [facility]. Please see a copy of my visit note below.    Jemima Augustine is a 25 year old female who is being evaluated via a billable video visit.      How would you like to obtain your AVS? [employer]      Video Start Time: 4:27 PM    Subjective     Jemima Augustine is a 25 year old female who presents to clinic today for the following health issues -headache follow up     Last visit in September of 2020 and has been doing Emgality since June 2020 and currently  Patient takes eletriptan as needed and helps sometimes -50% of the time and helps more than rizatriptan or sumatriptan but still works only 50% of the time.   Migraine headache duration lasting a day and severe migraine headache frequency a couple per month.   Patient reports that Emgality helps with a severe migraine headaches but still has some little headaches a little more than half per month.   Patient takes naproxen or just waits until improves or turns into something worse. Milder headaches are 5-6/10 on the numeric pain scale and \"more like annoying\". Milder headaches in the temples.   Patient reports that she sleeps for a long time when she does not have school and sometimes cannot sleep because of the headache  Amitriptyline did not seem work so she stopped  Patient is on propranolol 60 mg ER for headaches and anxiety. Patient reports that was on 90 mg and was \"fainting\" with a blood pressure low. Has been on proranolol for years and initially helped but anxiety and headaches are not controlled.   Patient hasn't seen anyone except PCP.   Patient reports that she uses toradol injections one or twice per month  Reports that blood pressure is 100/70 in average.     TMD referral " was given but patient reports that there was miscommunication paper work and patient messed up the date a second time and cancel patient as a client.       Plan discussed:  Continue Emgality for headache prevention  Gabapentin 100 mg at bedtime for one week, then take 200 mg at bedtime. Side effects-nausea, dizziness, swelling  Anxiety control may help with headache prevention-discuss with PCP  Physical therapy for jaw, temples tension pain and TMJ- may be contributing to the headaches  Acute migraine headache treatment -a trial of Nurtec as needed for acute migraine headache treatment. Can be taken the same day with eletriptan. May cause nausea  Follow up in 6-8 weeks or sooner if needed     ROS:  CONSTITUTIONAL: NEGATIVE for fever, chills, change in weight  ENT/MOUTH: NEGATIVE for ear, mouth and throat problems  RESP: NEGATIVE for significant cough or SOB  CV: NEGATIVE for chest pain, palpitations or peripheral edema      Objective       Vitals:  No vitals were obtained today due to virtual visit.    Physical Exam   GENERAL: Healthy, alert and no distress, headache today at 5/10  EYES: Eyes grossly normal to inspection.   RESP: No audible wheeze, cough, or visible cyanosis.  No visible retractions or increased work of breathing.    SKIN: Visible skin clear.   NEURO: Cranial nerves grossly intact.  PSYCH: Mentation appears normal, affect normal/bright, judgement and insight intact, normal speech and appearance well-groomed.          Video-Visit Details    Type of service:  Video Visit    Video End Time:4:55 PM    Originating Location (pt. Location): Home    Distant Location (provider location):  [facility] NEUROLOG[facility]     Platform used for Video Visit: Marita Morgan, EMT          Again, thank you for allowing me to participate in the care of your patient.      Sincerely,    HERMES Doherty CNP

## 2021-01-05 ENCOUNTER — OFFICE VISIT (OUTPATIENT)
Dept: FAMILY MEDICINE | Facility: CLINIC | Age: 26
End: 2021-01-05
Payer: COMMERCIAL

## 2021-01-05 ENCOUNTER — TELEPHONE (OUTPATIENT)
Dept: NEUROLOGY | Facility: CLINIC | Age: 26
End: 2021-01-05

## 2021-01-05 VITALS
SYSTOLIC BLOOD PRESSURE: 114 MMHG | HEART RATE: 76 BPM | DIASTOLIC BLOOD PRESSURE: 78 MMHG | OXYGEN SATURATION: 98 % | BODY MASS INDEX: 26.05 KG/M2 | TEMPERATURE: 98.1 F | HEIGHT: 63 IN | WEIGHT: 147 LBS | RESPIRATION RATE: 16 BRPM

## 2021-01-05 DIAGNOSIS — F43.23 ADJUSTMENT DISORDER WITH MIXED ANXIETY AND DEPRESSED MOOD: Primary | ICD-10-CM

## 2021-01-05 DIAGNOSIS — F90.0 ADHD (ATTENTION DEFICIT HYPERACTIVITY DISORDER), INATTENTIVE TYPE: ICD-10-CM

## 2021-01-05 DIAGNOSIS — F41.9 ANXIETY: ICD-10-CM

## 2021-01-05 DIAGNOSIS — G43.011 INTRACTABLE MIGRAINE WITHOUT AURA AND WITH STATUS MIGRAINOSUS: ICD-10-CM

## 2021-01-05 DIAGNOSIS — M26.609 TMJ (TEMPOROMANDIBULAR JOINT SYNDROME): ICD-10-CM

## 2021-01-05 DIAGNOSIS — R51.9 DAILY HEADACHE: ICD-10-CM

## 2021-01-05 DIAGNOSIS — R29.3 ABNORMAL POSTURE: ICD-10-CM

## 2021-01-05 DIAGNOSIS — M25.531 RIGHT WRIST PAIN: ICD-10-CM

## 2021-01-05 DIAGNOSIS — Z02.82 ADOPTED: ICD-10-CM

## 2021-01-05 DIAGNOSIS — Z11.3 SCREENING EXAMINATION FOR VENEREAL DISEASE: ICD-10-CM

## 2021-01-05 DIAGNOSIS — Z23 NEEDS FLU SHOT: ICD-10-CM

## 2021-01-05 PROCEDURE — 99214 OFFICE O/P EST MOD 30 MIN: CPT | Mod: 25 | Performed by: NURSE PRACTITIONER

## 2021-01-05 PROCEDURE — 90686 IIV4 VACC NO PRSV 0.5 ML IM: CPT | Performed by: NURSE PRACTITIONER

## 2021-01-05 PROCEDURE — 87491 CHLMYD TRACH DNA AMP PROBE: CPT | Performed by: NURSE PRACTITIONER

## 2021-01-05 PROCEDURE — 90471 IMMUNIZATION ADMIN: CPT | Performed by: NURSE PRACTITIONER

## 2021-01-05 PROCEDURE — 87591 N.GONORRHOEAE DNA AMP PROB: CPT | Performed by: NURSE PRACTITIONER

## 2021-01-05 RX ORDER — DEXTROAMPHETAMINE SACCHARATE, AMPHETAMINE ASPARTATE MONOHYDRATE, DEXTROAMPHETAMINE SULFATE AND AMPHETAMINE SULFATE 3.75; 3.75; 3.75; 3.75 MG/1; MG/1; MG/1; MG/1
15 CAPSULE, EXTENDED RELEASE ORAL DAILY
Qty: 30 CAPSULE | Refills: 0 | Status: CANCELLED | OUTPATIENT
Start: 2021-01-05

## 2021-01-05 RX ORDER — DEXTROAMPHETAMINE SACCHARATE, AMPHETAMINE ASPARTATE, DEXTROAMPHETAMINE SULFATE AND AMPHETAMINE SULFATE 1.25; 1.25; 1.25; 1.25 MG/1; MG/1; MG/1; MG/1
5 TABLET ORAL DAILY PRN
Qty: 30 TABLET | Refills: 0 | Status: CANCELLED | OUTPATIENT
Start: 2021-01-05

## 2021-01-05 RX ORDER — DEXTROAMPHETAMINE SACCHARATE, AMPHETAMINE ASPARTATE MONOHYDRATE, DEXTROAMPHETAMINE SULFATE AND AMPHETAMINE SULFATE 3.75; 3.75; 3.75; 3.75 MG/1; MG/1; MG/1; MG/1
15 CAPSULE, EXTENDED RELEASE ORAL DAILY
Qty: 30 CAPSULE | Refills: 0 | Status: SHIPPED | OUTPATIENT
Start: 2021-03-08 | End: 2021-01-22

## 2021-01-05 RX ORDER — CITALOPRAM HYDROBROMIDE 10 MG/1
10 TABLET ORAL DAILY
Qty: 30 TABLET | Refills: 1 | Status: SHIPPED | OUTPATIENT
Start: 2021-01-05 | End: 2021-01-05

## 2021-01-05 RX ORDER — DEXTROAMPHETAMINE SACCHARATE, AMPHETAMINE ASPARTATE, DEXTROAMPHETAMINE SULFATE AND AMPHETAMINE SULFATE 1.25; 1.25; 1.25; 1.25 MG/1; MG/1; MG/1; MG/1
5 TABLET ORAL DAILY
Qty: 30 TABLET | Refills: 0 | Status: SHIPPED | OUTPATIENT
Start: 2021-03-08 | End: 2021-04-07

## 2021-01-05 RX ORDER — DEXTROAMPHETAMINE SACCHARATE, AMPHETAMINE ASPARTATE, DEXTROAMPHETAMINE SULFATE AND AMPHETAMINE SULFATE 1.25; 1.25; 1.25; 1.25 MG/1; MG/1; MG/1; MG/1
5 TABLET ORAL DAILY
Qty: 30 TABLET | Refills: 0 | Status: SHIPPED | OUTPATIENT
Start: 2021-02-05 | End: 2021-03-07

## 2021-01-05 RX ORDER — ESCITALOPRAM OXALATE 10 MG/1
10 TABLET ORAL DAILY
Qty: 30 TABLET | Refills: 1 | Status: SHIPPED | OUTPATIENT
Start: 2021-01-05 | End: 2021-03-04

## 2021-01-05 RX ORDER — DEXTROAMPHETAMINE SACCHARATE, AMPHETAMINE ASPARTATE, DEXTROAMPHETAMINE SULFATE AND AMPHETAMINE SULFATE 1.25; 1.25; 1.25; 1.25 MG/1; MG/1; MG/1; MG/1
5 TABLET ORAL DAILY
Qty: 30 TABLET | Refills: 0 | Status: SHIPPED | OUTPATIENT
Start: 2021-01-05 | End: 2021-02-04

## 2021-01-05 RX ORDER — DEXTROAMPHETAMINE SACCHARATE, AMPHETAMINE ASPARTATE MONOHYDRATE, DEXTROAMPHETAMINE SULFATE AND AMPHETAMINE SULFATE 3.75; 3.75; 3.75; 3.75 MG/1; MG/1; MG/1; MG/1
15 CAPSULE, EXTENDED RELEASE ORAL DAILY
Qty: 30 CAPSULE | Refills: 0 | Status: SHIPPED | OUTPATIENT
Start: 2021-01-05 | End: 2021-02-04

## 2021-01-05 RX ORDER — DEXTROAMPHETAMINE SACCHARATE, AMPHETAMINE ASPARTATE MONOHYDRATE, DEXTROAMPHETAMINE SULFATE AND AMPHETAMINE SULFATE 3.75; 3.75; 3.75; 3.75 MG/1; MG/1; MG/1; MG/1
15 CAPSULE, EXTENDED RELEASE ORAL DAILY
Qty: 30 CAPSULE | Refills: 0 | Status: SHIPPED | OUTPATIENT
Start: 2021-02-05 | End: 2021-03-07

## 2021-01-05 ASSESSMENT — ANXIETY QUESTIONNAIRES
7. FEELING AFRAID AS IF SOMETHING AWFUL MIGHT HAPPEN: NOT AT ALL
1. FEELING NERVOUS, ANXIOUS, OR ON EDGE: MORE THAN HALF THE DAYS
GAD7 TOTAL SCORE: 10
3. WORRYING TOO MUCH ABOUT DIFFERENT THINGS: MORE THAN HALF THE DAYS
6. BECOMING EASILY ANNOYED OR IRRITABLE: SEVERAL DAYS
5. BEING SO RESTLESS THAT IT IS HARD TO SIT STILL: SEVERAL DAYS
2. NOT BEING ABLE TO STOP OR CONTROL WORRYING: MORE THAN HALF THE DAYS

## 2021-01-05 ASSESSMENT — MIFFLIN-ST. JEOR: SCORE: 1390.92

## 2021-01-05 ASSESSMENT — PATIENT HEALTH QUESTIONNAIRE - PHQ9
5. POOR APPETITE OR OVEREATING: MORE THAN HALF THE DAYS
SUM OF ALL RESPONSES TO PHQ QUESTIONS 1-9: 8

## 2021-01-05 NOTE — TELEPHONE ENCOUNTER
"Prior Authorization Retail Medication Request    Medication/Dose: Nurtec 75 mg  ICD code (if different than what is on RX):  Chronic migraine  Previously Tried and Failed:    Rationale:  Patient takes eletriptan as needed and helps sometimes -50% of the time and helps more than rizatriptan or sumatriptan but still works only 50% of the time.   Migraine headache duration lasting a day and severe migraine headache frequency a couple per month.   Patient reports that Emgality helps with a severe migraine headaches but still has some little headaches a little more than half per month.   Patient takes naproxen or just waits until improves or turns into something worse. Milder headaches are 5-6/10 on the numeric pain scale and \"more like annoying\". Milder headaches in the temples.   Patient reports that she sleeps for a long time when she does not have school and sometimes cannot sleep because of the headache  Amitriptyline did not seem work so she stopped  Patient is on propranolol 60 mg ER for headaches and anxiety. Patient reports that was on 90 mg and was \"fainting\" with a blood pressure low. Has been on proranolol for years and initially helped but anxiety and headaches are not controlled.    Insurance Name:  [facility]  Insurance ID:  [id number]       Pharmacy Information (if different than what is on RX)  Name:    Phone:    "

## 2021-01-05 NOTE — TELEPHONE ENCOUNTER
Central Prior Authorization Team   Phone: [phone]      PA Initiation    Medication: Nurtec 75 mg  Insurance Company: [employer] - Phone [phone] Fax [phone]  Pharmacy Filling the Rx: [id number] [place], [place] - [address] AT [facility] [address]  Filling Pharmacy Phone: [phone]  Filling Pharmacy Fax:    Start Date: 1/5/2021

## 2021-01-06 PROBLEM — Z78.9 ADOPTED: Status: ACTIVE | Noted: 2021-01-06

## 2021-01-06 PROBLEM — Z02.82 ADOPTED: Status: ACTIVE | Noted: 2021-01-06

## 2021-01-06 ASSESSMENT — ANXIETY QUESTIONNAIRES: GAD7 TOTAL SCORE: 10

## 2021-01-06 NOTE — PATIENT INSTRUCTIONS
Patient Education     Preparing for Pregnancy  BIG: Even before you become pregnant, your health matters to your future baby. Adopt good health habits today. And take care of any medical problems you have before becoming pregnant.  Remember: As soon as you know you are pregnant, get regular prenatal care.  Things to consider  Read through the list below. The more items that describe you, the healthier you may be:    I eat a balanced diet.    I keep physically active.    I have my health problems under control.    My weight is about right.    I don t smoke.    I don t use recreational drugs.    I don t have a drinking problem.  Think about the following:    Who will help you through pregnancy and with childcare?    Do you have health insurance?    Do you have the money needed to cover childcare and other day-to-day child expenses?    Will you be able to take the time you need away from your job for maternity needs and childcare?  Adopt a healthy lifestyle  Each of the following tips can improve your health as you prepare for pregnancy:    Take folic acid 400 to 800 micrograms or a prenatal vitamin daily.     Eat a healthy, well-balanced diet.    Exercise 3 or more times a week and at least 150 minutes weekly.    Get within 15 pounds of your ideal weight.  The first weeks of pregnancy are the most important time in a baby s development. Before you become pregnant:    Don t use recreational drugs.    Don t drink alcohol.    Don t smoke.    Get recommended vaccines.  Working with your healthcare provider  Your healthcare provider can help answer any questions you may have. Do you know when to stop taking birth control pills? Are any over-the-counter medicines safe for pregnant women? You can also ask about special care you may need if you have any of the following:    Sexually transmitted diseases (STDs), like herpes or chlamydia    Diabetes    High blood pressure    Other chronic health problems  StayWell last  reviewed this educational content on 10/1/2017    1251-2845 [employer]. [address] [place] [address], [place] [address]. All rights reserved. This information is not intended as a substitute for professional medical care. Always follow your healthcare professional's instructions.           Patient Education     Pregnancy    Your exam today shows that you are pregnant.  Pregnancy symptoms  During pregnancy your body s hormones change. This causes physical and emotional changes. This is normal. Knowing what to expect is important for your piece of mind and so you know when to seek help for a problem. Here are some of the most common symptoms:     Morning sickness or nausea. This can happen any time of the day or night.    Tender, swollen breasts    Need to urinate frequently    Tiredness or fatigue    Dizziness    Indigestion or heartburn    Food cravings or turn-offs    Constipation    Emotional changes. This can range from anxiety to excitement to depression.  General care for a healthy pregnancy  Here are things you can do to help make sure your baby is born healthy:    Rest when you feel tired. This is especially true in the later months of pregnancy.    Drink more fluids. Your body needs more fluids than you may be used to. Drink 8 to10 glasses of juice, milk, or water every day.    Eat well-balanced meals. Eat at regular times to give your body enough protein. You can expect to gain about 30 pounds during the pregnancy. Don t try to diet or lose weight while you are pregnant.    Take a prenatal vitamin every day. This helps you meet the extra nutritional needs of pregnancy.    Don t take any other medicine during your pregnancy unless your healthcare provider tells you to. This includes prescription medicines and those you buy over the counter. Many medicines can harm the growing baby.    If you have nausea or vomiting, don t eat greasy or fried foods. Eat several smaller meals throughout the day rather  than 3 large meals.    If you smoke, you must stop. The nicotine you breathe in goes right to the baby.    Stay away from alcohol, even in moderate amounts. Daily drinking will harm your baby and can cause permanent brain damage.    Don t use recreational drugs, especially cocaine, crack, and heroin. These will harm your baby. Also avoid marijuana.    If you were using recreational drugs or prescribed medicine when you found out that you were pregnant, talk with your healthcare provider about possible effects on your growing baby.    If you have medical problems that you need to take medicine for, talk with your healthcare provider.  Follow-up care  Call your healthcare provider to arrange for prenatal care. Prenatal care is important. You can see your family provider, a pregnancy specialist (obstetrician), a midwife, or a primary care clinic.   When to seek medical advice  Call your healthcare provider right away if any of these occur:    Vaginal bleeding    Pain in your belly (abdomen) or back that is moderate or severe    Lots of vomiting, or you can t keep any fluids down for 6 hours    Burning feeling when you urinate    Headache, dizziness, or rapid weight gain    Fever    Vision changes or blurred vision  [employer] last reviewed this educational content on 11/1/2019 2000-2020 [employer]. [address] [place] [address]. All rights reserved. This information is not intended as a substitute for professional medical care. Always follow your healthcare professional's instructions.           Patient Education     Pregnancy: Common Questions  There are plenty of myths and  old wives  tales  surrounding pregnancy. You may need help  fact from fiction. On this sheet, you ll find answers to a few common questions. If you have other questions, talk with your healthcare provider.     Will working harm my baby?  In most cases, working throughout your pregnancy is not harmful at all.  There may be concerns if the job involves dangerous machinery or chemicals, lifting, or standing for very long periods of time. Talk to your healthcare provider and employer about your particular job and pregnancy.   Is it safe to have sexual relations during pregnancy?  Yes, unless you are specifically instructed not to by your healthcare provider.  Why can t I change the cat litter box?  Cats carry a disease called toxoplasmosis. In adult humans, it shows up as a mild infection of the blood and organs. If you are infected during pregnancy, the baby s brain and eyes could be damaged. To be safe, have someone else change the litter. If you must handle it, wear a paper mask over your nose and mouth. Also, wear gloves and wash your hands afterward.   Which medicines are safe?  No prescription or over-the-counter medicine is safe for everyone all of the time. But sometimes medicines are needed.  Be sure your healthcare provider knows you are pregnant. Then use only the medicines he or she advises you to take.   Is it true that I can overheat my baby?  Yes. To avoid making your baby too warm:    Don t sit in a Jacuzzi. A long, warm bath is fine, but not in water over 100 F (37.7 C).    Exercise less intensely if you feel fatigued. Base your workout on how you feel, not your heart rate. Heart rates aren t a good way to measure effort during pregnancy.  Can I lift and carry safely?  Yes, if your healthcare provider doesn t tell you otherwise. Learn to lift and carry safely to avoid injury and reduce back pain during pregnancy. To protect your back:     Bend at the knees to bring the load nearer.    Get a good . Test the weight of the load.    Tighten your belly. Exhale as you lift.    Lift with your legs, not with your back.    Carry the load close to your body.    Hold the load so you can see where you are going.  What if I get sick?  Most women get sick at least once during pregnancy. Talk with your healthcare  provider if you do. Most likely it will not affect your pregnancy. Get plenty of rest and fluids, and eat what you can. Talk to your healthcare provider before taking any medicines.   [email or web address] last reviewed this educational content on 10/1/2017    8656-0893 [employer]. [address] [place] [address]. All rights reserved. This information is not intended as a substitute for professional medical care. Always follow your healthcare professional's instructions.           Patient Education     Pregnancy: Body Changes  From conception (fertilization) until after the birth of your child, you and your baby will change every day. To help you understand what is happening, we ve outlined how pregnancy begins and some of the changes you may notice.    Your changing body  Pregnancy affects almost every part of your body. You may notice some of the following physical and emotional changes:    Your uterus expands outward and upward as your baby grows. You may feel pressure on your bladder, stomach, and other organs.    You may notice skin color changes on your forehead, nose, and cheeks. A dark line may form from your bellybutton down to your pubic area. The skin color around your nipples and thighs may also change.    Pink stretch marks may appear on your abdomen, breasts, or hips.    Your hair may seem thicker. You lose less hair during pregnancy.    You may feel fine 1 day and weepy the next. This is caused by changes in your body, like increased hormones. These are chemicals that affect the function of certain organs and also your moods.    You may experience constipation, hemorrhoids, and/or heartburn.    You may experience mild shortness of breath.    Your legs may cramp.    You may have nausea and vomiting.    You may experience dizziness, extreme tiredness, and sleep problems.    You may experience temporary bladder control problems.    Nose bleeds and nasal stuffiness are common.     The  fertilized egg travels down the fallopian tube and attaches to the uterus.    How pregnancy begins  Conception is the union of a sperm and an egg. When it happens, your baby s genetic makeup is complete, even its sex. Fertilization takes place in the fallopian tube. The fertilized egg then travels down this tube to the uterus (womb). The egg attaches to the lining of the uterus about a week after conception. There it grows and is nourished.  [employer] last reviewed this educational content on 1/1/2018 2000-2020 [employer]. [address] [place] [address]. All rights reserved. This information is not intended as a substitute for professional medical care. Always follow your healthcare professional's instructions.

## 2021-01-07 LAB
C TRACH DNA SPEC QL NAA+PROBE: NEGATIVE
N GONORRHOEA DNA SPEC QL NAA+PROBE: NEGATIVE
SPECIMEN SOURCE: NORMAL
SPECIMEN SOURCE: NORMAL

## 2021-01-07 NOTE — TELEPHONE ENCOUNTER
Prior Authorization Not Needed pharmacy    Medication: Nurtec 75 mg- PA Not Needed  Insurance Company: [employer] - Phone [phone] Fax [phone]  Expected CoPay:      Pharmacy Filling the Rx: [id number] [place], [place] - [address] AT [facility] OF [address]  Pharmacy Notified: Yes  Patient Notified: No    Called pharmacy for insurance info but they stated PA is not needed and patient was able to  yesterday

## 2021-01-08 ENCOUNTER — TELEPHONE (OUTPATIENT)
Dept: NEUROLOGY | Facility: CLINIC | Age: 26
End: 2021-01-08

## 2021-01-08 NOTE — TELEPHONE ENCOUNTER
"Prior Authorization Retail Medication Request     Medication/Dose: Nurtec 75 mg  ICD code (if different than what is on RX):  Chronic migraine  Previously Tried and Failed:    Rationale:  Patient takes eletriptan as needed and helps sometimes -50% of the time and helps more than rizatriptan or sumatriptan but still works only 50% of the time.   Migraine headache duration lasting a day and severe migraine headache frequency a couple per month.   Patient reports that Emgality helps with a severe migraine headaches but still has some little headaches a little more than half per month.   Patient takes naproxen or just waits until improves or turns into something worse. Milder headaches are 5-6/10 on the numeric pain scale and \"more like annoying\". Milder headaches in the temples.   Patient reports that she sleeps for a long time when she does not have school and sometimes cannot sleep because of the headache  Amitriptyline did not seem work so she stopped  Patient is on propranolol 60 mg ER for headaches and anxiety. Patient reports that was on 90 mg and was \"fainting\" with a blood pressure low. Has been on proranolol for years and initially helped but anxiety and headaches are not controlled.     Insurance Name:  [employer]  Insurance ID:[id number]         Pharmacy Information (if different than what is on RX)  Name:    Phone:    "

## 2021-01-12 NOTE — TELEPHONE ENCOUNTER
Central Prior Authorization Team   Phone: [phone]      PA Initiation    Medication: Rimegepant Sulfate (Nurtec) 75 MG TBDP  Insurance Company: [employer] - Phone [phone] Fax [phone]  Pharmacy Filling the Rx: [facility] #[id number] [place], [place] - [address] AT [facility] OF [address]  Filling Pharmacy Phone: [phone]  Filling Pharmacy Fax:    Start Date: 1/12/2021

## 2021-01-14 NOTE — TELEPHONE ENCOUNTER
Prior Authorization Approval    Authorization Effective Date: 1/1/2021  Authorization Expiration Date: 1/14/2022  Medication: Rimegepant Sulfate (Nurtec) 75 MG TBDP-APPROVED  Approved Dose/Quantity:   Reference #:     Insurance Company: [employer] - Phone [phone] Fax [phone]  Expected CoPay:       CoPay Card Available:      Foundation Assistance Needed:    Which Pharmacy is filling the prescription (Not needed for infusion/clinic administered): [facility] #[id number] - [place], [place] - [address] AT [facility] OF [address]  Pharmacy Notified: Yes-Pharmacy stated patient was able to  on 1/4/21 for $0 copay.  Patient Notified: No

## 2021-01-20 ENCOUNTER — ANCILLARY PROCEDURE (OUTPATIENT)
Dept: GENERAL RADIOLOGY | Facility: CLINIC | Age: 26
End: 2021-01-20
Attending: ORTHOPAEDIC SURGERY
Payer: COMMERCIAL

## 2021-01-20 ENCOUNTER — MYC MEDICAL ADVICE (OUTPATIENT)
Dept: FAMILY MEDICINE | Facility: CLINIC | Age: 26
End: 2021-01-20

## 2021-01-20 ENCOUNTER — OFFICE VISIT (OUTPATIENT)
Dept: ORTHOPEDICS | Facility: CLINIC | Age: 26
End: 2021-01-20
Attending: NURSE PRACTITIONER
Payer: COMMERCIAL

## 2021-01-20 VITALS
SYSTOLIC BLOOD PRESSURE: 120 MMHG | BODY MASS INDEX: 25.27 KG/M2 | DIASTOLIC BLOOD PRESSURE: 78 MMHG | HEIGHT: 64 IN | HEART RATE: 97 BPM | WEIGHT: 148 LBS

## 2021-01-20 DIAGNOSIS — M25.531 RIGHT WRIST PAIN: ICD-10-CM

## 2021-01-20 PROCEDURE — 73110 X-RAY EXAM OF WRIST: CPT | Mod: RT | Performed by: RADIOLOGY

## 2021-01-20 PROCEDURE — 99243 OFF/OP CNSLTJ NEW/EST LOW 30: CPT | Performed by: ORTHOPAEDIC SURGERY

## 2021-01-20 ASSESSMENT — PAIN SCALES - GENERAL: PAINLEVEL: NO PAIN (0)

## 2021-01-20 ASSESSMENT — MIFFLIN-ST. JEOR: SCORE: 1403.38

## 2021-01-20 NOTE — LETTER
"    1/20/2021         RE: Jemima Augustine  [address] [place] [id number]        Dear Colleague,    Thank you for referring your patient, Jemima Augustine, to the [facility] [place]. Please see a copy of my visit note below.    Chief Complaint:   Chief Complaint   Patient presents with     Right Wrist - Pain     Right wrist pain on and off. Onset 2 months ago. Hurts to bear weight on right arm. Right hand dominant.           Jemima Augustine is seen today in the [facility] for evaluation of right wrist pain at the request of Marsha Hernandez NP.      HPI: Jemima Augustine is a 25 year old female , right -hand dominant, who presents for evaluation and management of a right wrist pain, no known injury, but then thinks maybe went down on it hard one day working out.. Pain has been present for 2 months. Since that time, pain has improved, doesn't really bother much. Was initially seen a month or so ago at a clinic in [place] and given a wrist brace. Was told \"the ulnar bone is too short\". She mentioned this at her primary care provider visit recently and was told to go see ortho about it. Locates pain along the outer aspect of the wrist, occasionally. She states it doesn't really bother her much now, maybe now and then. No swelling. No bruising. No treatments other than a brace.       She reports having mild pain/discomfort around the wrist site. She denies associated numbness or tingling. She denies any other injuries to her upper extremity.   Symptoms: pain.  Location of symptoms: volar-ulnar aspect of wrist.  Pain severity: 0/10  Pain quality: dull and tight/pulling  Frequency of symptoms: occasionally  Aggravating factors: certain movements.  Relieving factors: rest..    Previous treatment: brace    Past medical history:  has no past medical history on file.   Patient Active Problem List   Diagnosis     ADHD (attention " deficit hyperactivity disorder), inattentive type     Generalized anxiety disorder     Migraines     Overweight     Seizure-like activity (H)     Adopted          Past surgical history:  has no past surgical history on file.     Medications:    Current Outpatient Medications   Medication Sig Dispense Refill     amphetamine-dextroamphetamine (ADDERALL XR) 15 MG 24 hr capsule Take 1 capsule (15 mg) by mouth daily 30 capsule 0     [START ON 2/5/2021] amphetamine-dextroamphetamine (ADDERALL XR) 15 MG 24 hr capsule Take 1 capsule (15 mg) by mouth daily 30 capsule 0     [START ON 3/8/2021] amphetamine-dextroamphetamine (ADDERALL XR) 15 MG 24 hr capsule Take 1 capsule (15 mg) by mouth daily 30 capsule 0     amphetamine-dextroamphetamine (ADDERALL) 5 MG tablet Take 1 tablet (5 mg) by mouth daily In the afternoon as needed 30 tablet 0     [START ON 2/5/2021] amphetamine-dextroamphetamine (ADDERALL) 5 MG tablet Take 1 tablet (5 mg) by mouth daily In the afternoon as needed 30 tablet 0     [START ON 3/8/2021] amphetamine-dextroamphetamine (ADDERALL) 5 MG tablet Take 1 tablet (5 mg) by mouth daily In the afternoon as needed 30 tablet 0     cyclobenzaprine (FLEXERIL) 10 MG tablet Take 0.5 tablets (5 mg) by mouth nightly as needed for muscle spasms 30 tablet 1     eletriptan (RELPAX) 20 MG tablet Take 1-2 tablets (20-40 mg) by mouth at onset of headache for migraine (may repeat 20-40 mg  in 2 hours as needed. Max 80 mg in 24 hours) 18 tablet 9     escitalopram (LEXAPRO) 10 MG tablet Take 1 tablet (10 mg) by mouth daily 30 tablet 1     gabapentin (NEURONTIN) 100 MG capsule Take one cap at bedtime for one week, then take two caps at bedtime if tolerated (Patient not taking: Reported on 1/5/2021) 60 capsule 3     galcanezumab-gnlm (EMGALITY) 120 MG/ML injection Inject 240 mg subcutaneous first month and then inject 120 mg subcutaneous every 28 days 2 mL 11     ketorolac (TORADOL) 30 MG/ML injection INJECT 1ML INTO THE MUSCLE AS  "NEEDED FOR MODERATE PAIN. MAX OF 1 INJECTION PER WEEK. Due for follow up 4 mL 0     Needle, Disp, (BD ECLIPSE NEEDLE) 25G X 5/8\" MISC 1 Syringe daily as needed 25 each 1     ondansetron (ZOFRAN-ODT) 4 MG ODT tab Take 1 tablet (4 mg) by mouth every 8 hours as needed for nausea (Patient not taking: Reported on 1/5/2021) 20 tablet 1     ondansetron (ZOFRAN-ODT) 4 MG ODT tab Take 1 tablet (4 mg) by mouth every 8 hours as needed for nausea or vomiting (Patient not taking: Reported on 1/5/2021) 20 tablet 3     propranolol (INDERAL) 60 MG tablet Take 1 tablet (60 mg) by mouth At Bedtime 30 tablet 1     Rimegepant Sulfate 75 MG TBDP Take 75 mg by mouth at onset of headache (one tab in 24 hours) (Patient not taking: Reported on 1/5/2021) 8 tablet 3     Syringe, Disposable, (SYRINGE LUER LOCK) 3 ML MISC 1 Syringe daily as needed 25 each 1        Allergies:     Allergies   Allergen Reactions     Lisdexamfetamine Other (See Comments)        Family History: family history is not on file.     Social History: 6th grade .  reports that she has never smoked. She has never used smokeless tobacco. She reports that she does not drink alcohol or use drugs.    Review of Systems:  ROS: 10 point ROS neg other than the symptoms noted above in the HPI and past medical history.    Physical Exam  GENERAL APPEARANCE: healthy, alert, no distress.   SKIN: no suspicious lesions or rashes  NEURO: Normal strength and tone, mentation intact and speech normal  PSYCH:  mentation appears normal and affect normal. Not anxious.  RESPIRATORY: No increased work of breathing.    /78   Pulse 97   Ht 1.613 m (5' 3.5\")   Wt 67.1 kg (148 lb)   BMI 25.81 kg/m       RIGHT HAND / WRIST EXAM:    Skin intact. No abnormal skin discoloration, erythema or ecchymosis.   Normal wear pattern, color and tone.  No observable or palpable masses of the fingers or palm or wrist.  No palpable triggering of fingers.   No observable or palpable cords or " nodules of the fingers or palm.    There is no swelling in the forearm, wrist, hand of the right upper extremity .  There is minimal tenderness in the volar ulnar aspect of the wrist over the pisiform, hypothenar eminence, FCU.  There is no tenderness in the distal radius, distal ulna, TFCC, ECU, 1st dorsal compartment, extension tendons, flexor tendons, scaphoid, snuff box of the wrist.  There is no ecchymosis.  There is no erythema of the surrounding skin.  There is no maceration of the skin.  There is no gross deformity in the area.  Intact extensors. No extensor lag.      Intact sensation light touch median, radial, ulnar nerves of the hand  Intact sensation to the radial and ulnar digital nerves of the fingers, as well as the finger tips.  Intact epl fpl fdp edc wrist flexion/extension biceps/triceps deltoid  Brisk capillary refill to all fingers.   Palpable radial pulse, 2+.    X-rays:  3 views right wrist from 1/20/2021 were reviewed in clinic today. On my review, No obvious fracture or dislocation. No obvious bony abnormality or lesion.    Assessment: 24yo RHD female with right wrist pain, likely deep bone contusion.    Plan:  * exam, imaging findings discussed  * suspect maybe did land down on it and sore over the pisiform area. No obvious fracture. Suspect contusion of the carpal bones  * as it appears improved, will continue to monitor  * rest, over the counter pain control, wrist brace as needed, activity modification  * return to clinic as needed.        Cristi Matt M.D., M.S.  Dept. of Orthopaedic Surgery  [facility]        Again, thank you for allowing me to participate in the care of your patient.        Sincerely,        Cristi Matt MD

## 2021-01-20 NOTE — PROGRESS NOTES
"Chief Complaint:   Chief Complaint   Patient presents with     Right Wrist - Pain     Right wrist pain on and off. Onset 2 months ago. Hurts to bear weight on right arm. Right hand dominant.           Jemima Augustine is seen today in the [facility] for evaluation of right wrist pain at the request of Marsha Hernandez NP.      HPI: Jemima Augustine is a 25 year old female , right -hand dominant, who presents for evaluation and management of a right wrist pain, no known injury, but then thinks maybe went down on it hard one day working out.. Pain has been present for 2 months. Since that time, pain has improved, doesn't really bother much. Was initially seen a month or so ago at a clinic in [place] and given a wrist brace. Was told \"the ulnar bone is too short\". She mentioned this at her primary care provider visit recently and was told to go see ortho about it. Locates pain along the outer aspect of the wrist, occasionally. She states it doesn't really bother her much now, maybe now and then. No swelling. No bruising. No treatments other than a brace.       She reports having mild pain/discomfort around the wrist site. She denies associated numbness or tingling. She denies any other injuries to her upper extremity.   Symptoms: pain.  Location of symptoms: volar-ulnar aspect of wrist.  Pain severity: 0/10  Pain quality: dull and tight/pulling  Frequency of symptoms: occasionally  Aggravating factors: certain movements.  Relieving factors: rest..    Previous treatment: brace    Past medical history:  has no past medical history on file.   Patient Active Problem List   Diagnosis     ADHD (attention deficit hyperactivity disorder), inattentive type     Generalized anxiety disorder     Migraines     Overweight     Seizure-like activity (H)     Adopted          Past surgical history:  has no past surgical history on file.     Medications:    Current Outpatient Medications " "  Medication Sig Dispense Refill     amphetamine-dextroamphetamine (ADDERALL XR) 15 MG 24 hr capsule Take 1 capsule (15 mg) by mouth daily 30 capsule 0     [START ON 2/5/2021] amphetamine-dextroamphetamine (ADDERALL XR) 15 MG 24 hr capsule Take 1 capsule (15 mg) by mouth daily 30 capsule 0     [START ON 3/8/2021] amphetamine-dextroamphetamine (ADDERALL XR) 15 MG 24 hr capsule Take 1 capsule (15 mg) by mouth daily 30 capsule 0     amphetamine-dextroamphetamine (ADDERALL) 5 MG tablet Take 1 tablet (5 mg) by mouth daily In the afternoon as needed 30 tablet 0     [START ON 2/5/2021] amphetamine-dextroamphetamine (ADDERALL) 5 MG tablet Take 1 tablet (5 mg) by mouth daily In the afternoon as needed 30 tablet 0     [START ON 3/8/2021] amphetamine-dextroamphetamine (ADDERALL) 5 MG tablet Take 1 tablet (5 mg) by mouth daily In the afternoon as needed 30 tablet 0     cyclobenzaprine (FLEXERIL) 10 MG tablet Take 0.5 tablets (5 mg) by mouth nightly as needed for muscle spasms 30 tablet 1     eletriptan (RELPAX) 20 MG tablet Take 1-2 tablets (20-40 mg) by mouth at onset of headache for migraine (may repeat 20-40 mg  in 2 hours as needed. Max 80 mg in 24 hours) 18 tablet 9     escitalopram (LEXAPRO) 10 MG tablet Take 1 tablet (10 mg) by mouth daily 30 tablet 1     gabapentin (NEURONTIN) 100 MG capsule Take one cap at bedtime for one week, then take two caps at bedtime if tolerated (Patient not taking: Reported on 1/5/2021) 60 capsule 3     galcanezumab-gnlm (EMGALITY) 120 MG/ML injection Inject 240 mg subcutaneous first month and then inject 120 mg subcutaneous every 28 days 2 mL 11     ketorolac (TORADOL) 30 MG/ML injection INJECT 1ML INTO THE MUSCLE AS NEEDED FOR MODERATE PAIN. MAX OF 1 INJECTION PER WEEK. Due for follow up 4 mL 0     Needle, Disp, (BD ECLIPSE NEEDLE) 25G X 5/8\" MISC 1 Syringe daily as needed 25 each 1     ondansetron (ZOFRAN-ODT) 4 MG ODT tab Take 1 tablet (4 mg) by mouth every 8 hours as needed for nausea " "(Patient not taking: Reported on 1/5/2021) 20 tablet 1     ondansetron (ZOFRAN-ODT) 4 MG ODT tab Take 1 tablet (4 mg) by mouth every 8 hours as needed for nausea or vomiting (Patient not taking: Reported on 1/5/2021) 20 tablet 3     propranolol (INDERAL) 60 MG tablet Take 1 tablet (60 mg) by mouth At Bedtime 30 tablet 1     Rimegepant Sulfate 75 MG TBDP Take 75 mg by mouth at onset of headache (one tab in 24 hours) (Patient not taking: Reported on 1/5/2021) 8 tablet 3     Syringe, Disposable, (SYRINGE LUER LOCK) 3 ML MISC 1 Syringe daily as needed 25 each 1        Allergies:     Allergies   Allergen Reactions     Lisdexamfetamine Other (See Comments)        Family History: family history is not on file.     Social History: 6th grade .  reports that she has never smoked. She has never used smokeless tobacco. She reports that she does not drink alcohol or use drugs.    Review of Systems:  ROS: 10 point ROS neg other than the symptoms noted above in the HPI and past medical history.    Physical Exam  GENERAL APPEARANCE: healthy, alert, no distress.   SKIN: no suspicious lesions or rashes  NEURO: Normal strength and tone, mentation intact and speech normal  PSYCH:  mentation appears normal and affect normal. Not anxious.  RESPIRATORY: No increased work of breathing.    /78   Pulse 97   Ht 1.613 m (5' 3.5\")   Wt 67.1 kg (148 lb)   BMI 25.81 kg/m       RIGHT HAND / WRIST EXAM:    Skin intact. No abnormal skin discoloration, erythema or ecchymosis.   Normal wear pattern, color and tone.  No observable or palpable masses of the fingers or palm or wrist.  No palpable triggering of fingers.   No observable or palpable cords or nodules of the fingers or palm.    There is no swelling in the forearm, wrist, hand of the right upper extremity .  There is minimal tenderness in the volar ulnar aspect of the wrist over the pisiform, hypothenar eminence, FCU.  There is no tenderness in the distal radius, distal " ulna, TFCC, ECU, 1st dorsal compartment, extension tendons, flexor tendons, scaphoid, snuff box of the wrist.  There is no ecchymosis.  There is no erythema of the surrounding skin.  There is no maceration of the skin.  There is no gross deformity in the area.  Intact extensors. No extensor lag.      Intact sensation light touch median, radial, ulnar nerves of the hand  Intact sensation to the radial and ulnar digital nerves of the fingers, as well as the finger tips.  Intact epl fpl fdp edc wrist flexion/extension biceps/triceps deltoid  Brisk capillary refill to all fingers.   Palpable radial pulse, 2+.    X-rays:  3 views right wrist from 1/20/2021 were reviewed in clinic today. On my review, No obvious fracture or dislocation. No obvious bony abnormality or lesion.    Assessment: 24yo RHD female with right wrist pain, likely deep bone contusion.    Plan:  * exam, imaging findings discussed  * suspect maybe did land down on it and sore over the pisiform area. No obvious fracture. Suspect contusion of the carpal bones  * as it appears improved, will continue to monitor  * rest, over the counter pain control, wrist brace as needed, activity modification  * return to clinic as needed.        Cristi Matt M.D., M.S.  Dept. of Orthopaedic Surgery  [facility]

## 2021-01-22 ENCOUNTER — OFFICE VISIT (OUTPATIENT)
Dept: FAMILY MEDICINE | Facility: CLINIC | Age: 26
End: 2021-01-22
Payer: COMMERCIAL

## 2021-01-22 VITALS
BODY MASS INDEX: 25.47 KG/M2 | HEIGHT: 64 IN | OXYGEN SATURATION: 97 % | SYSTOLIC BLOOD PRESSURE: 116 MMHG | WEIGHT: 149.2 LBS | TEMPERATURE: 97.9 F | HEART RATE: 90 BPM | RESPIRATION RATE: 18 BRPM | DIASTOLIC BLOOD PRESSURE: 74 MMHG

## 2021-01-22 DIAGNOSIS — Z13.1 SCREENING FOR DIABETES MELLITUS: ICD-10-CM

## 2021-01-22 DIAGNOSIS — Z13.220 LIPID SCREENING: ICD-10-CM

## 2021-01-22 DIAGNOSIS — Z12.4 CERVICAL CANCER SCREENING: ICD-10-CM

## 2021-01-22 DIAGNOSIS — Z30.432 ENCOUNTER FOR IUD REMOVAL: ICD-10-CM

## 2021-01-22 DIAGNOSIS — Z00.00 ROUTINE GENERAL MEDICAL EXAMINATION AT A HEALTH CARE FACILITY: Primary | ICD-10-CM

## 2021-01-22 PROCEDURE — 58301 REMOVE INTRAUTERINE DEVICE: CPT | Performed by: FAMILY MEDICINE

## 2021-01-22 PROCEDURE — 99395 PREV VISIT EST AGE 18-39: CPT | Mod: 25 | Performed by: FAMILY MEDICINE

## 2021-01-22 PROCEDURE — G0145 SCR C/V CYTO,THINLAYER,RESCR: HCPCS | Performed by: FAMILY MEDICINE

## 2021-01-22 ASSESSMENT — MIFFLIN-ST. JEOR: SCORE: 1408.83

## 2021-01-22 ASSESSMENT — ANXIETY QUESTIONNAIRES
6. BECOMING EASILY ANNOYED OR IRRITABLE: SEVERAL DAYS
2. NOT BEING ABLE TO STOP OR CONTROL WORRYING: MORE THAN HALF THE DAYS
GAD7 TOTAL SCORE: 11
1. FEELING NERVOUS, ANXIOUS, OR ON EDGE: MORE THAN HALF THE DAYS
7. FEELING AFRAID AS IF SOMETHING AWFUL MIGHT HAPPEN: SEVERAL DAYS
3. WORRYING TOO MUCH ABOUT DIFFERENT THINGS: MORE THAN HALF THE DAYS
5. BEING SO RESTLESS THAT IT IS HARD TO SIT STILL: MORE THAN HALF THE DAYS

## 2021-01-22 ASSESSMENT — PATIENT HEALTH QUESTIONNAIRE - PHQ9
SUM OF ALL RESPONSES TO PHQ QUESTIONS 1-9: 6
5. POOR APPETITE OR OVEREATING: SEVERAL DAYS

## 2021-01-22 NOTE — PROGRESS NOTES
SUBJECTIVE:   CC: Jemima Augustine is an 25 year old woman who presents for preventive health visit.       Patient has been advised of split billing requirements and indicates understanding: Yes  Healthy Habits:    Do you get at least three servings of calcium containing foods daily (dairy, green leafy vegetables, etc.)? yes    Amount of exercise or daily activities, outside of work: 5 day(s) per week    Problems taking medications regularly No    Medication side effects: No    Have you had an eye exam in the past two years? yes    Do you see a dentist twice per year? yes    Do you have sleep apnea, excessive snoring or daytime drowsiness?no      Requesting IUD removal.     History  Of ADH, inattentive type- currently on Aderrall. Following with her PCP.     History of migraine headaches, controlled on medication. Following with neurology.     HEALTH CARE MAINTENANCE: Due for a Pap smear.       Patient informed that anything we discuss that is not related to preventative medicine, may be billed for; patient verbalizes understanding.      Today's PHQ-2 Score:   PHQ-2 ( 1999 [employer]) 1/22/2021 1/5/2021   Q1: Little interest or pleasure in doing things 0 0   Q2: Feeling down, depressed or hopeless 0 0   PHQ-2 Score 0 0       Abuse: Current or Past(Physical, Sexual or Emotional)- No  Do you feel safe in your environment? Yes        Social History     Tobacco Use     Smoking status: Never Smoker     Smokeless tobacco: Never Used   Substance Use Topics     Alcohol use: Never     Frequency: Never     If you drink alcohol do you typically have >3 drinks per day or >7 drinks per week? No                     Reviewed orders with patient.  Reviewed health maintenance and updated orders accordingly - Yes  Lab work is in process  Labs reviewed in EPIC  BP Readings from Last 3 Encounters:   01/22/21 116/74   01/20/21 120/78   01/05/21 114/78    Wt Readings from Last 3 Encounters:   01/22/21 67.7 kg (149 lb 3.2 oz)  "  01/20/21 67.1 kg (148 lb)   01/05/21 66.7 kg (147 lb)                  Patient Active Problem List   Diagnosis     ADHD (attention deficit hyperactivity disorder), inattentive type     Generalized anxiety disorder     Migraines     Overweight     Seizure-like activity (H)     Adopted     Past Surgical History:   Procedure Laterality Date     FOOT SURGERY Right        Social History     Tobacco Use     Smoking status: Never Smoker     Smokeless tobacco: Never Used   Substance Use Topics     Alcohol use: Never     Frequency: Never     Family History   Adopted: Yes         Current Outpatient Medications   Medication Sig Dispense Refill     amphetamine-dextroamphetamine (ADDERALL XR) 15 MG 24 hr capsule Take 1 capsule (15 mg) by mouth daily 30 capsule 0     amphetamine-dextroamphetamine (ADDERALL) 5 MG tablet Take 1 tablet (5 mg) by mouth daily In the afternoon as needed 30 tablet 0     eletriptan (RELPAX) 20 MG tablet Take 1-2 tablets (20-40 mg) by mouth at onset of headache for migraine (may repeat 20-40 mg  in 2 hours as needed. Max 80 mg in 24 hours) 18 tablet 9     escitalopram (LEXAPRO) 10 MG tablet Take 1 tablet (10 mg) by mouth daily 30 tablet 1     galcanezumab-gnlm (EMGALITY) 120 MG/ML injection Inject 240 mg subcutaneous first month and then inject 120 mg subcutaneous every 28 days 2 mL 11     ketorolac (TORADOL) 30 MG/ML injection INJECT 1ML INTO THE MUSCLE AS NEEDED FOR MODERATE PAIN. MAX OF 1 INJECTION PER WEEK. Due for follow up 4 mL 0     Needle, Disp, (BD ECLIPSE NEEDLE) 25G X 5/8\" MISC 1 Syringe daily as needed 25 each 1     propranolol (INDERAL) 60 MG tablet Take 1 tablet (60 mg) by mouth At Bedtime 30 tablet 1     Syringe, Disposable, (SYRINGE LUER LOCK) 3 ML MISC 1 Syringe daily as needed 25 each 1     [START ON 2/5/2021] amphetamine-dextroamphetamine (ADDERALL XR) 15 MG 24 hr capsule Take 1 capsule (15 mg) by mouth daily 30 capsule 0     [START ON 2/5/2021] amphetamine-dextroamphetamine " "(ADDERALL) 5 MG tablet Take 1 tablet (5 mg) by mouth daily In the afternoon as needed 30 tablet 0     [START ON 3/8/2021] amphetamine-dextroamphetamine (ADDERALL) 5 MG tablet Take 1 tablet (5 mg) by mouth daily In the afternoon as needed 30 tablet 0     Allergies   Allergen Reactions     Lisdexamfetamine Other (See Comments)       Mammogram not appropriate for this patient based on age.    Pertinent mammograms are reviewed under the imaging tab.  History of abnormal Pap smear: NO - age 21-29 PAP every 3 years recommended     Reviewed and updated as needed this visit by clinical staff  Tobacco  Allergies  Meds   Med Hx  Surg Hx  Fam Hx          Reviewed and updated as needed this visit by Provider  Tobacco     Med Hx  Surg Hx  Fam Hx             ROS:  CONSTITUTIONAL: NEGATIVE for fever, chills, change in weight  INTEGUMENTARU/SKIN: NEGATIVE for worrisome rashes, moles or lesions  EYES: NEGATIVE for vision changes or irritation  ENT: NEGATIVE for ear, mouth and throat problems  RESP: NEGATIVE for significant cough or SOB  BREAST: NEGATIVE for masses, tenderness or discharge  CV: NEGATIVE for chest pain, palpitations or peripheral edema  GI: NEGATIVE for nausea, abdominal pain, heartburn, or change in bowel habits  : NEGATIVE for unusual urinary or vaginal symptoms. Periods are regular.  MUSCULOSKELETAL: NEGATIVE for significant arthralgias or myalgia  NEURO: NEGATIVE for weakness, dizziness or paresthesias  PSYCHIATRIC: NEGATIVE for changes in mood or affect    OBJECTIVE:   /74   Pulse 90   Temp 97.9  F (36.6  C) (Tympanic)   Resp 18   Ht 1.613 m (5' 3.5\")   Wt 67.7 kg (149 lb 3.2 oz)   SpO2 97%   Breastfeeding No   BMI 26.02 kg/m    EXAM:  GENERAL: healthy, alert and no distress  EYES: Eyes grossly normal to inspection, PERRL and conjunctivae and sclerae normal  HENT: ear canals and TM's normal, nose and mouth without ulcers or lesions  NECK: no adenopathy, no asymmetry, masses, or scars and " thyroid normal to palpation  RESP: lungs clear to auscultation - no rales, rhonchi or wheezes  BREAST: normal without masses, tenderness or nipple discharge and no palpable axillary masses or adenopathy  CV: regular rate and rhythm, normal S1 S2, no S3 or S4, no murmur, click or rub, no peripheral edema and peripheral pulses strong  ABDOMEN: soft, nontender, no hepatosplenomegaly, no masses and bowel sounds normal   (female): normal female external genitalia, normal urethral meatus, vaginal mucosa pink, moist, well rugated, and normal cervix/adnexa/uterus without masses or discharge. IUD in place. Pap smear done.   MS: no gross musculoskeletal defects noted, no edema  SKIN: no suspicious lesions or rashes  NEURO: Normal strength and tone, mentation intact and speech normal  PSYCH: mentation appears normal, affect normal/bright    Diagnostic Test Results:  Labs reviewed in Epic    ASSESSMENT/PLAN:   Jemima was seen today for physical.    Diagnoses and all orders for this visit:    Routine general medical examination at a health care facility    Cervical cancer screening  -     Pap imaged thin layer screen reflex to HPV if ASCUS - recommend age 25 - 29    Lipid screening  -     Lipid Profile; Future    Screening for diabetes mellitus  -     Glucose; Future    Encounter for IUD removal  -     REMOVE INTRAUTERINE DEVICE    Verbal consent obtained.   Speculum was inserted, IUD strings were visualized. Using ring forceps, the strings were grasped and pulled toward the introitus in a firm and deliberate motion until the IUD was delivered.   Patient tolerated procedure well without any immediate complications.  Postprocedure information given, may have some minor spotting and cramping for a few days. May take OTC NSAID as needed for discomfort        Patient has been advised of split billing requirements and indicates understanding: Yes  COUNSELING:   Reviewed preventive health counseling, as reflected in patient  "instructions       Regular exercise       Healthy diet/nutrition    Estimated body mass index is 26.02 kg/m  as calculated from the following:    Height as of this encounter: 1.613 m (5' 3.5\").    Weight as of this encounter: 67.7 kg (149 lb 3.2 oz).    Weight management plan: Discussed healthy diet and exercise guidelines    She reports that she has never smoked. She has never used smokeless tobacco.      Counseling Resources:  ATP IV Guidelines  Pooled Cohorts Equation Calculator  Breast Cancer Risk Calculator  BRCA-Related Cancer Risk Assessment: FHS-7 Tool  FRAX Risk Assessment  ICSI Preventive Guidelines  Dietary Guidelines for Americans, 2010  USDA's MyPlate  ASA Prophylaxis  Lung CA Screening    Follow up in 6 months- med check.   Next Annual Physical due in 1 /2021    Aleta Matthews MD  [facility]  "

## 2021-01-23 ASSESSMENT — ANXIETY QUESTIONNAIRES: GAD7 TOTAL SCORE: 11

## 2021-01-26 LAB
COPATH REPORT: NORMAL
PAP: NORMAL

## 2021-02-16 ENCOUNTER — MYC MEDICAL ADVICE (OUTPATIENT)
Dept: FAMILY MEDICINE | Facility: CLINIC | Age: 26
End: 2021-02-16

## 2021-02-16 DIAGNOSIS — G43.719 INTRACTABLE CHRONIC MIGRAINE WITHOUT AURA AND WITHOUT STATUS MIGRAINOSUS: ICD-10-CM

## 2021-02-16 NOTE — TELEPHONE ENCOUNTER
[place] message sent to patient.    Donna BSN-RN  Triage Nurse  [facility]: [facility]

## 2021-02-19 NOTE — TELEPHONE ENCOUNTER
Jemima Augustine  to Marsha Hernandez, NP     4:42 PM    [facility]     And Namrata Nava    Patient wants a Rx for Emgality and a PA will be needed. Can this be prescribed here? MyChart message was addressed to Marsha ZAIDI

## 2021-02-22 DIAGNOSIS — G43.719 INTRACTABLE CHRONIC MIGRAINE WITHOUT AURA AND WITHOUT STATUS MIGRAINOSUS: ICD-10-CM

## 2021-02-22 NOTE — TELEPHONE ENCOUNTER
Rx Authorization:    Requested Medication/ Dose galcanezumab-gnlm (EMGALITY) 120 MG/ML injection    Date last refill ordered: 2/19/21    Quantity ordered: 2mL    # refills: 4    Date of last clinic visit with ordering provider: 1/4/21    Date of next clinic visit with ordering provider:     All pertinent protocol data (lab date/result):     Include pertinent information from patients message:

## 2021-02-23 ENCOUNTER — TELEPHONE (OUTPATIENT)
Dept: NEUROLOGY | Facility: CLINIC | Age: 26
End: 2021-02-23

## 2021-02-23 NOTE — TELEPHONE ENCOUNTER
Prior Authorization Specialty Medication Request    Medication/Dose: galcanezumab-gnlm (EMGALITY) 120 MG/ML injection  ICD code (if different than what is on RX):    Previously Tried and Failed:      Insurance Name: Insurance ID:   Insurance Phone Number:     Pharmacy Information (if different than what is on RX)  Name:  [facility]  Phone: [phone]

## 2021-02-25 DIAGNOSIS — F41.9 ANXIETY: ICD-10-CM

## 2021-02-25 NOTE — TELEPHONE ENCOUNTER
PA Initiation    Medication: galcanezumab-gnlm (EMGALITY) 120 MG/ML injection  Insurance Company: [employer] - Phone [phone] Fax [phone]  Pharmacy Filling the Rx: [id number] [place], [place] - [address] AT [facility] [address]  Filling Pharmacy Phone: [phone]  Filling Pharmacy Fax:    Start Date: 2/25/2021    Central Prior Authorization Team   Phone: [phone]

## 2021-02-25 NOTE — TELEPHONE ENCOUNTER
Prior Authorization Approval    Authorization Effective Date: 1/26/2021  Authorization Expiration Date: 2/25/2022  Medication: galcanezumab-gnlm (EMGALITY) 120 MG/ML injection  Approved Dose/Quantity: 1ml  Reference #:   [id number]  Insurance Company: [employer] - Phone [phone] Fax [phone]  Which Pharmacy is filling the prescription (Not needed for infusion/clinic administered): [id number] - [place], [place] - [address] AT [facility] [address]  Pharmacy Notified: Yes

## 2021-02-26 RX ORDER — PROPRANOLOL HYDROCHLORIDE 60 MG/1
TABLET ORAL
Qty: 30 TABLET | Refills: 1 | Status: SHIPPED | OUTPATIENT
Start: 2021-02-26 | End: 2021-04-19

## 2021-02-27 NOTE — TELEPHONE ENCOUNTER
"Prescription approved per [facility] Refill Protocol.  Requested Prescriptions   Pending Prescriptions Disp Refills     propranolol (INDERAL) 60 MG tablet [Pharmacy Med Name: PROPRANOLOL 60MG TABLETS] 30 tablet 1     Sig: TAKE 1 TABLET(60 MG) BY MOUTH AT BEDTIME       Beta-Blockers Protocol Passed - 2/25/2021  3:59 PM        Passed - Blood pressure under 140/90 in past 12 months     BP Readings from Last 3 Encounters:   01/22/21 116/74   01/20/21 120/78   01/05/21 114/78                 Passed - Patient is age 6 or older        Passed - Recent (12 mo) or future (30 days) visit within the authorizing provider's specialty     Patient has had an office visit with the authorizing provider or a provider within the authorizing providers department within the previous 12 mos or has a future within next 30 days. See \"Patient Info\" tab in inbasket, or \"Choose Columns\" in Meds & Orders section of the refill encounter.              Passed - Medication is active on med list             "

## 2021-03-03 ENCOUNTER — TRANSFERRED RECORDS (OUTPATIENT)
Dept: HEALTH INFORMATION MANAGEMENT | Facility: CLINIC | Age: 26
End: 2021-03-03

## 2021-03-04 ENCOUNTER — TELEPHONE (OUTPATIENT)
Dept: FAMILY MEDICINE | Facility: CLINIC | Age: 26
End: 2021-03-04

## 2021-03-23 ENCOUNTER — MYC MEDICAL ADVICE (OUTPATIENT)
Dept: FAMILY MEDICINE | Facility: CLINIC | Age: 26
End: 2021-03-23

## 2021-03-23 DIAGNOSIS — G43.719 INTRACTABLE CHRONIC MIGRAINE WITHOUT AURA AND WITHOUT STATUS MIGRAINOSUS: ICD-10-CM

## 2021-03-23 DIAGNOSIS — M54.9 BACK PAIN, UNSPECIFIED BACK LOCATION, UNSPECIFIED BACK PAIN LATERALITY, UNSPECIFIED CHRONICITY: Primary | ICD-10-CM

## 2021-04-08 ENCOUNTER — OFFICE VISIT (OUTPATIENT)
Dept: PALLIATIVE MEDICINE | Facility: CLINIC | Age: 26
End: 2021-04-08
Attending: NURSE PRACTITIONER
Payer: COMMERCIAL

## 2021-04-08 VITALS — SYSTOLIC BLOOD PRESSURE: 115 MMHG | DIASTOLIC BLOOD PRESSURE: 62 MMHG | HEART RATE: 78 BPM

## 2021-04-08 DIAGNOSIS — G43.719 INTRACTABLE CHRONIC MIGRAINE WITHOUT AURA AND WITHOUT STATUS MIGRAINOSUS: ICD-10-CM

## 2021-04-08 DIAGNOSIS — M54.9 BACK PAIN, UNSPECIFIED BACK LOCATION, UNSPECIFIED BACK PAIN LATERALITY, UNSPECIFIED CHRONICITY: ICD-10-CM

## 2021-04-08 PROCEDURE — 99204 OFFICE O/P NEW MOD 45 MIN: CPT | Performed by: PAIN MEDICINE

## 2021-04-08 ASSESSMENT — PAIN SCALES - GENERAL: PAINLEVEL: MILD PAIN (2)

## 2021-04-08 NOTE — Clinical Note
I feel her symptoms are less likely to be associated with any neck pathology.  I know patient is seeing a new neurologist so I did write down some recommendations  Thanks for the referral please reach out anytime with questions or concerns

## 2021-04-08 NOTE — PATIENT INSTRUCTIONS
- Further procedures recommended:    - consider bilateral TMJ injection   - would discuss botox as an option in order to be able to titrate off some medicatons  - Medication Management: Would recommend making one change at a time. Has an appointment with neurology in 5/25/21  - Physical Therapy: Would consider PHYSICAL THERAPY  For your neck/posture    - Would highly recommend speaking with your dentist about an mouth piece for TJ  - Clinical Health Psychologist to address issues of relaxation, behavioral change, coping style, and other factors important to improvement: consider   - Diagnostic Studies: consider cervical xray to r/o cervical spondylosis  - Urine toxicology screen today: no    - Follow up: as needed       ----------------------------------------------------------------  Clinic Number:  [phone]     Call with any questions about your care and for scheduling assistance.     Calls are returned Monday through Friday between 8 AM and 4:30 PM. We usually get back to you within 2 business days depending on the issue/request.    If we are prescribing your medications:    For opioid medication refills, call the clinic or send a [employer] message 7 days in advance.  Please include:    Name of requested medication    Name of the pharmacy.    For non-opioid medications, call your pharmacy directly to request a refill. Please allow 3-4 days to be processed.     Per [place] State Law:    All controlled substance prescriptions must be filled within 30 days of being written.      For those controlled substances allowing refills, pickup must occur within 30 days of last fill.      We believe regular attendance is key to your success in our program!      Any time you are unable to keep your appointment we ask that you call us at least 24 hours in advance to cancel.This will allow us to offer the appointment time to another patient.     Multiple missed appointments may lead to dismissal from the clinic.

## 2021-04-08 NOTE — PROGRESS NOTES
[facility]    Date of visit: 4/8/2021    Primary Care Provider: Dr. Physician No Ref-Primary    Reason for consultation:    Jemima Augustine is a 25 year old female who is seen in consultation today at the request of her primary care physician, No Ref-Primary, Physician .     Consultation and Evaluation for: Medical spine evaluation    Review of [place] Prescription Monitoring Program (): Today I have also reviewed the patient's history of controlled substance use, as provided by [place] licensed pharmacies and prescriber dispensers.    Review of Pain Questionnaire: Please see the [facility] [place] health questionnaire, which the patient completed and reviewed with me in detail.    Review of Electronic Chart: Today I have also reviewed available medical information in the patient's medical record at [facility] ([facility]), including relevant provider notes, laboratory work, and imaging.       Chief Complaint:    Headache    Pain history: History of migraines    Jemima Augustine is a 25 year old female with chronic headaches    Concern for TMJ as a cause currently working with PT mild improvement.  No patient reports never discussing a custom mouthguard  Never started gabapentin does not want to be on more medications  The pt is adopted so unsure of family hx interms of headaches Started when she was 10   She denies any inciting event  The pt has had a lot of head trauma in setting of fainting.  Although this was later in life  headache   30   month.   Headache location and description: throbbing pain.  Headache frequency: continuous  Associated symptoms: eye pain, fatigue, nausea and neck pain   Exacerbating factors: Stress/decreased sleep  Alleviating factors: dark room, rest, significant benefit with current medical regimen  Previous headache Hx is positive for: Chronic daily  Past treatment regiments: Multiple agents  Ranges in intersity   Headaches are  intermittent worse although has a baseline level of headaches at all times  + n/neg vomitng  Neg aura/blind spots  Neg tearing  Neg rhino  + stress + sleep triggers  Hx of having vaso vagal  When having HAs.  Denies any weakness when she has headaches  The HA are mostly over the occiput and radiate forward  The pain varies in severity   The pain varies in nature  The pain can be dull  The pain can be throbbing      Of note patient reports minimal upper neck pain    Overall headaches significantly affect her quality of life.  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     Impact of Pain: There is significant limitation quality of life.    Current medication treatments include:    Pain medications are being prescribed by .  emgality   lexapro  relpax  Prop  Nurtec- takes 2-3 tmes a wk   toradol    Never started kaylee    Previous medication treatments included:  elavil  Other treatments have included:  Jemima Augustine has not been seen at a pain clinic in the past.  Although is currently seen by neurology    PT: Currently for TMJ.  No never done anything for her neck  No injections    Past Medical History:  Past Medical History:   Diagnosis Date     ADHD (attention deficit hyperactivity disorder), inattentive type      History of migraine headaches     ff Neurology     IUD (intrauterine device) in place      Past Surgical History:  Past Surgical History:   Procedure Laterality Date     FOOT SURGERY Right      Medications:  Current Outpatient Medications   Medication Sig Dispense Refill     eletriptan (RELPAX) 20 MG tablet Take 1-2 tablets (20-40 mg) by mouth at onset of headache for migraine (may repeat 20-40 mg  in 2 hours as needed.  "Max 80 mg in 24 hours) 18 tablet 9     escitalopram (LEXAPRO) 10 MG tablet TAKE 1 TABLET(10 MG) BY MOUTH DAILY 30 tablet 1     galcanezumab-gnlm (EMGALITY) 120 MG/ML injection Inject 1 mL (120 mg) Subcutaneous every 28 days 1 mL 11     ketorolac (TORADOL) 30 MG/ML injection INJECT 1ML INTO THE MUSCLE AS NEEDED FOR MODERATE PAIN. MAX OF 1 INJECTION PER WEEK. Due for follow up 4 mL 0     Needle, Disp, (BD ECLIPSE NEEDLE) 25G X 5/8\" MISC 1 Syringe daily as needed 25 each 1     propranolol (INDERAL) 60 MG tablet TAKE 1 TABLET(60 MG) BY MOUTH AT BEDTIME 30 tablet 1     Syringe, Disposable, (SYRINGE LUER LOCK) 3 ML MISC 1 Syringe daily as needed 25 each 1     Allergies:     Allergies   Allergen Reactions     Lisdexamfetamine Other (See Comments)       Social History:    Occupation/Schooling:y    Chemical dependency: The patient denies any history of treatment for alcohol or drug abuse.    Family history:  Family History   Adopted: Yes             Diagnostic Tests:  Reviewed  Review of Systems:    POSTIVE IN BOLD  GENERAL: fever/chills, fatigue, general unwell feeling, weight gain/loss.  HEAD/EYES:  headache, dizziness, or vision changes.    EARS/NOSE/THROAT:  Nosebleeds, hearing loss, sinus infection, earache, tinnitus.  IMMUNE:  Allergies, cancer, immune deficiency, or infections.  SKIN:  Urticaria, rash, hives  HEME/Lymphatic:   anemia, easy bruising, easy bleeding.  RESPIRATORY:  cough, wheezing, or shortness of breath  CARDIOVASCULAR/Circulation:  Extremity edema, syncope, hypertension, tachycardia, or angina.  GASTROINTESTINAL:  abdominal pain, nausea/emesis, diarrhea, constipation,  hematochezia, or melena.  ENDOCRINE:  Diabetes, steroid use,  thyroid disease or osteoporosis.  MUSCULOSKELETAL: neck pain, back pain, arthralgia, arthritis, or gout.  GENITOURINARY:  frequency, urgency, dysuria, difficulty voiding, hematuria or incontinence.  NEUROLOGIC:  weakness, numbness, paresthesias, seizure, tremor, stroke or " memory loss.  PSYCHIATRIC:  depression, anxiety, stress, suicidal thoughts or mood swings.     Physical Exam:  There were no vitals filed for this visit.  Exam:  Constitutional: healthy, alert and no distress  Head: normocephalic. Atraumatic.   Eyes: no redness or jaundice noted   ENT: oropharnx normal.  MMM.  Neck supple.    Cardiovascular: Negative JVD  Respiratory: Speaking full sentences no accessory muscles    G  Skin: no suspicious lesions or rashes  Psychiatric: mentation appears normal and affect normal/bright    Musculoskeletal exam:  Gait/Station/Posture: Within normal limits  Cervical spine: *Within normal limits negative Spurling   thoracic spine:  Normal       Neurologic exam:  Motor:  5/5 UE    Reflexes:     Biceps:     R:  2/4 L: 2/4   Brachioradialis   R:  2/4 L: 2/4   Triceps:  R:  2/4 L: 2/4     Sensory:  (upper and lower extremities):   Light touch: normal    Allodynia: absent    Hyperalgesia: absent     Diagnostic tests:       Assessment:  Jemima Augustine is a 25 year old female with chronic headaches/migraines    [place] [facility] database review:         Plan:  Diagnosis reviewed, treatment options addressed, and risks/benefits discussed. The patient was involved in shared decision making regarding the plan as laid out below.    - Further procedures recommended:    - consider bilateral TMJ injection   - would discuss botox as an option in order to be able to titrate off some medicatons  - Medication Management: Would recommend making one change at a time. Has an appointment with neurology in 5/25/21  - Physical Therapy: Would consider PHYSICAL THERAPY  For your neck/posture    - Would highly recommend speaking with your dentist about an mouth piece for TMJ  - Clinical Health Psychologist to address issues of relaxation, behavioral change, coping style, and other factors important to improvement: consider   - Diagnostic Studies: consider cervical xray to r/o cervical spondylosis  - Urine toxicology  screen today: no    - Follow up: as needed             The total TIME spent on this patient on the day of the appointment was 35 minutes.   Time spent preparing to see the patient (reviewing records and tests)  Time spend face to face with the patient  Time spent ordering tests, medications, procedures and referrals  Time spent Referring and communicating with other healthcare professionals  Documenting clinical information in Epic  Zackary Frausto MD.  Medical Spine Specialist  [facility]

## 2021-04-15 DIAGNOSIS — F41.9 ANXIETY: ICD-10-CM

## 2021-04-15 DIAGNOSIS — F43.23 ADJUSTMENT DISORDER WITH MIXED ANXIETY AND DEPRESSED MOOD: ICD-10-CM

## 2021-04-19 RX ORDER — ESCITALOPRAM OXALATE 10 MG/1
TABLET ORAL
Qty: 30 TABLET | Refills: 1 | Status: SHIPPED | OUTPATIENT
Start: 2021-04-19 | End: 2021-07-14

## 2021-04-19 RX ORDER — PROPRANOLOL HYDROCHLORIDE 60 MG/1
TABLET ORAL
Qty: 30 TABLET | Refills: 1 | Status: SHIPPED | OUTPATIENT
Start: 2021-04-19 | End: 2021-07-06

## 2021-04-19 NOTE — TELEPHONE ENCOUNTER
Routing refill request to provider for review/approval because:  Failed protocols: PHQ-9>4  PHQ-9 score:    PHQ 1/22/2021   PHQ-9 Total Score 6   Q9: Thoughts of better off dead/self-harm past 2 weeks Not at all       Iza Milligan RN

## 2021-05-11 DIAGNOSIS — G43.719 INTRACTABLE CHRONIC MIGRAINE WITHOUT AURA AND WITHOUT STATUS MIGRAINOSUS: ICD-10-CM

## 2021-05-11 RX ORDER — GALCANEZUMAB 120 MG/ML
120 INJECTION, SOLUTION SUBCUTANEOUS
Qty: 1 ML | Refills: 11 | Status: SHIPPED | OUTPATIENT
Start: 2021-05-11 | End: 2021-07-14

## 2021-05-11 NOTE — TELEPHONE ENCOUNTER
Rx Authorization:    Requested Medication/ Dose: Emgality 120 MG/ML AUTO INJECTOR 1ML    Date last refill ordered: 2/22/21    Quantity ordered: 1ML    # refills:     Date of last clinic visit with ordering provider: 1/4/21    Date of next clinic visit with ordering provider: 5/25/21    All pertinent protocol data (lab date/result):     Include pertinent information from patients message:

## 2021-05-25 ENCOUNTER — VIRTUAL VISIT (OUTPATIENT)
Dept: NEUROLOGY | Facility: CLINIC | Age: 26
End: 2021-05-25
Payer: COMMERCIAL

## 2021-05-25 DIAGNOSIS — R55 SYNCOPE, UNSPECIFIED SYNCOPE TYPE: ICD-10-CM

## 2021-05-25 DIAGNOSIS — G43.009 MIGRAINE WITHOUT AURA AND WITHOUT STATUS MIGRAINOSUS, NOT INTRACTABLE: Primary | ICD-10-CM

## 2021-05-25 PROBLEM — R56.9 SEIZURE-LIKE ACTIVITY (H): Status: RESOLVED | Noted: 2019-06-18 | Resolved: 2021-05-25

## 2021-05-25 PROCEDURE — 99215 OFFICE O/P EST HI 40 MIN: CPT | Mod: 95 | Performed by: PSYCHIATRY & NEUROLOGY

## 2021-05-25 PROCEDURE — 99417 PROLNG OP E/M EACH 15 MIN: CPT | Performed by: PSYCHIATRY & NEUROLOGY

## 2021-05-25 RX ORDER — RIMEGEPANT SULFATE 75 MG/75MG
75 TABLET, ORALLY DISINTEGRATING ORAL DAILY PRN
Qty: 8 TABLET | Refills: 11 | Status: SHIPPED | OUTPATIENT
Start: 2021-05-25 | End: 2021-06-22

## 2021-05-25 RX ORDER — PREDNISOLONE 5 MG/1
5 TABLET ORAL DAILY
COMMUNITY
End: 2021-07-14

## 2021-05-25 RX ORDER — DEXTROAMPHETAMINE SACCHARATE, AMPHETAMINE ASPARTATE MONOHYDRATE, DEXTROAMPHETAMINE SULFATE AND AMPHETAMINE SULFATE 3.75; 3.75; 3.75; 3.75 MG/1; MG/1; MG/1; MG/1
1 CAPSULE, EXTENDED RELEASE ORAL PRN
COMMUNITY
Start: 2021-05-11 | End: 2021-07-14

## 2021-05-25 SDOH — ECONOMIC STABILITY: FOOD INSECURITY: WITHIN THE PAST 12 MONTHS, YOU WORRIED THAT YOUR FOOD WOULD RUN OUT BEFORE YOU GOT MONEY TO BUY MORE.: NOT ASKED

## 2021-05-25 SDOH — ECONOMIC STABILITY: TRANSPORTATION INSECURITY
IN THE PAST 12 MONTHS, HAS THE LACK OF TRANSPORTATION KEPT YOU FROM MEDICAL APPOINTMENTS OR FROM GETTING MEDICATIONS?: NOT ASKED

## 2021-05-25 SDOH — ECONOMIC STABILITY: INCOME INSECURITY: HOW HARD IS IT FOR YOU TO PAY FOR THE VERY BASICS LIKE FOOD, HOUSING, MEDICAL CARE, AND HEATING?: NOT ASKED

## 2021-05-25 SDOH — ECONOMIC STABILITY: TRANSPORTATION INSECURITY
IN THE PAST 12 MONTHS, HAS LACK OF TRANSPORTATION KEPT YOU FROM MEETINGS, WORK, OR FROM GETTING THINGS NEEDED FOR DAILY LIVING?: NOT ASKED

## 2021-05-25 SDOH — ECONOMIC STABILITY: FOOD INSECURITY: WITHIN THE PAST 12 MONTHS, THE FOOD YOU BOUGHT JUST DIDN'T LAST AND YOU DIDN'T HAVE MONEY TO GET MORE.: NOT ASKED

## 2021-05-25 NOTE — LETTER
5/25/2021       RE: Jemima Augustine  [address] [place] [id number]     Dear Colleague,    Thank you for referring your patient, Jemima Augustine, to the [facility] [place] at [facility]. Please see a copy of my visit note below.    Jemima is a 26 year old who is being evaluated via a billable video visit.      How would you like to obtain your AVS? Mychart  If the video visit is dropped, the invitation should be resent by: [phone]    Video-Visit Details    Type of service:  Video Visit    Video Start Time: 2:06 PM    Video End Time: 2:42PM    Originating Location (pt. Location): Home    Distant Location (provider location):  [facility] [place]     Platform used for Video Visit: [employer] [place] [facility]    Jemima Augustine MRN# [id number]   Age: 26 year old YOB: 1995     Requesting physician: Referred Self  No Ref-Primary, Physician            Assessment and Plan:      (G43.009) Migraine without aura and without status migrainosus, not intractable  (primary encounter diagnosis)  Comment: The patient currently has episodic migraine without aura. She is on a lot of preventive medicine for the amount of symptoms she is reporting. I advised her to stop Emgality as she is about to get  and interested in becoming pregnant. She will not be able to use Nurtec during pregnancy either.  Plan:  1. Preventive: Stop Emgality after June dose  2. Preventive: Continue Propranolol for now, awaiting cardiology work up  3.Acute:  Nurtec prn for acute therapy but will not be able to take in future. May need to switch back to triptans      (R55) Syncope, unspecified syncope type  Comment: This still has not been worked up completely. She seems to tell slightly different variations to different providers. She is having fainting episodes 10 or so in life without warning and  no clear precipitant. Unlikely to be vasovagal if occurring while seated. She has at least one ER visit with injury due to fall and two episodes of clonic activity (one assoiciated with marijuana and one associated with alcohol) . EEG normal but MRI does have some asymmetry so seizure snot entirely ruled out at this time but seems less likely. She is adopted so family history not available.   Plan:   1. 14 day Zio patch looking for arrhythmia  2. If negative we will stop propranolol and observe  3. Consider repeat MRI brain with 3 T to assess for hippocampal sclerosis    I will see after cardiac monitoring. She wants to wait until after her June wedding.     Today I spent 79 minutes caring for the patient including chart review, video visit and documentation.    Ann Marie Lubin MD          History of Present Illness:     chief complaint: Transfer of care for migraines     Jemima Augustine is a 26 year old patient presenting for assessment of headaches. She has previously been seen by Ms Nvaa and has requested transfer.     She has had headaches all her life since childhood. Migraines were severe in highschool. She was put on propranolol and that stopped the severe migraines but she still has headaches    She describes migraines as severe headaches that leave her unable to do anything, just sleeps and stays in bed    Her current headaches are less severe, can still function still go to work most of the time but can still have migrainous features of nausea. She misses work about 3 times a year due to these headaches.     Current headache symptoms:  Frequency: 1 times a week  Quality: Dull  Location: can be anywhere in the head, temples, back of the head, coming up for the neck  Duration: 4-24 hoursAssociated symptoms: more severe headache-nausea. She notes she motion sickness.   Awakens from sleep due to sx's:  Yes 1 x a month  Precipitating Injury:  No    Triggers: Nothing (she has kept food logs and hasn't  found any clear triggers)    Previous Treatment Trials:  Acute therapies:  Eletriptan not effective  Sumatriptan  Rizatriptan  Nurtec-on currently and it works  Toradol helped    Chronic therapies:  Propranolol 60 mg   Gabapentin never took  Emgality on currently not sure if working.   Amitriptyline   Sertraline    She also reports fainting every once in a while since high school. 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 Seems to be getting worse lately. She had her dose lowered for propranolol but it hasn't fixed the issue. She doesn't get warning.  See below for review of record and summary of syncope vs seizure work up.             Physical Exam:     Limited due to video  She is awake and alert. No acute distress. Some poor recall in regards to past testing and work up of syncope vs seizures and past medication trials  Her face appears symmetric and face movements are normal. No aphasia or dysarthria         Pertinent history/data   EEG 8/13/2020 Read by Dr Garcia was normal  MRI brain report 11/8/2018 [facility] subtle asymmetric volume in right hippocampal formation (images in PACS and I reviewed and I agree there is a very mild asymmetry but also note the patient is not lined up well in scanner and image quality is not high)  MRA brain 11/8/2018 reportedly normal  3/10/2020-EKG sinus rhythm low voltage QRS consider septal infarct  4/15/2020 48 hour holter ordered, ?performed patient does not remember    Reviewed [facility] records from Dr Eladio Caldwell. There is history of two events of loss of consciousness associated with clonic activity. First associated with marijuana use, second associated with drinking. The patient was placed on lamotrigine but she subsequently chose to stop the medicine. They were documenting a diagnosis of generalized seizures.     Reviewed Dr Jean's notes 1/8/2020 no mention of  seizures. Just mention of syncope and dose reduction of propranolol  2/17/2020 Amanuel Nava's note reviewed. Syncope vs Seizure question noted. Referral made to Dr Diaz for second opinion.  4/15/2020 Dr Diaz's note reviewed he recommended EEG (results listed above) and Cardiology work up but I can't see cardiology consult was placed. 48 hours holter ordered 4/20/2020 but I can't see it was ever performed.     3/27/17 Dr Gallagher [facility] note reviewed. HR in 40s and 50s, reduced propranolol dose from 80 to 60      Again, thank you for allowing me to participate in the care of your patient.      Sincerely,    Ann Marie Lubin MD

## 2021-05-25 NOTE — PROGRESS NOTES
Jemima is a 26 year old who is being evaluated via a billable video visit.      How would you like to obtain your AVS? Mychart  If the video visit is dropped, the invitation should be resent by: [phone]    Video Start Time: 2:06 PM  Video-Visit Details    Type of service:  Video Visit    Video End Time: 2:42PM    Originating Location (pt. Location): Home    Distant Location (provider location):  [facility] [place]     Platform used for Video Visit: [employer] [place] [facility]    Jemima Augustine MRN# [id number]   Age: 26 year old YOB: 1995     Requesting physician: Referred Self  No Ref-Primary, Physician            Assessment and Plan:      (G43.009) Migraine without aura and without status migrainosus, not intractable  (primary encounter diagnosis)  Comment: The patient currently has episodic migraine without aura. She is on a lot of preventive medicine for the amount of symptoms she is reporting. I advised her to stop Emgality as she is about to get  and interested in becoming pregnant. She will not be able to use Nurtec during pregnancy either.  Plan:  1. Preventive: Stop Emgality after June dose  2. Preventive: Continue Propranolol for now, awaiting cardiology work up  3.Acute:  Nurtec prn for acute therapy but will not be able to take in future. May need to switch back to triptans      (R55) Syncope, unspecified syncope type  Comment: This still has not been worked up completely. She seems to tell slightly different variations to different providers. She is having fainting episodes 10 or so in life without warning and no clear precipitant. Unlikely to be vasovagal if occurring while seated. She has at least one ER visit with injury due to fall and two episodes of clonic activity (one assoiciated with marijuana and one associated with alcohol) . EEG normal but MRI does have some asymmetry so seizure snot entirely ruled out at this time but seems  less likely. She is adopted so family history not available.   Plan:   1. 14 day Zio patch looking for arrhythmia  2. If negative we will stop propranolol and observe  3. Consider repeat MRI brain with 3 T to assess for hippocampal sclerosis    I will see after cardiac monitoring. She wants to wait until after her June wedding.     Today I spent 79 minutes caring for the patient including chart review, video visit and documentation.    Ann Marie Lubin MD          History of Present Illness:     chief complaint: Transfer of care for migraines     Jemima Augustine is a 26 year old patient presenting for assessment of headaches. She has previously been seen by Ms Nava and has requested transfer.     She has had headaches all her life since childhood. Migraines were severe in highschool. She was put on propranolol and that stopped the severe migraines but she still has headaches    She describes migraines as severe headaches that leave her unable to do anything, just sleeps and stays in bed    Her current headaches are less severe, can still function still go to work most of the time but can still have migrainous features of nausea. She misses work about 3 times a year due to these headaches.     Current headache symptoms:  Frequency: 1 times a week  Quality: Dull  Location: can be anywhere in the head, temples, back of the head, coming up for the neck  Duration: 4-24 hoursAssociated symptoms: more severe headache-nausea. She notes she motion sickness.   Awakens from sleep due to sx's:  Yes 1 x a month  Precipitating Injury:  No    Triggers: Nothing (she has kept food logs and hasn't found any clear triggers)    Previous Treatment Trials:  Acute therapies:  Eletriptan not effective  Sumatriptan  Rizatriptan  Nurtec-on currently and it works  Toradol helped    Chronic therapies:  Propranolol 60 mg   Gabapentin never took  Emgality on currently not sure if working.   Amitriptyline   Sertraline    She also reports  fainting every once in a while since high school. 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 Seems to be getting worse lately. She had her dose lowered for propranolol but it hasn't fixed the issue. She doesn't get warning.  See below for review of record and summary of syncope vs seizure work up.             Physical Exam:     Limited due to video  She is awake and alert. No acute distress. Some poor recall in regards to past testing and work up of syncope vs seizures and past medication trials  Her face appears symmetric and face movements are normal. No aphasia or dysarthria         Pertinent history/data   EEG 8/13/2020 Read by Dr Garcia was normal  MRI brain report 11/8/2018 [facility] subtle asymmetric volume in right hippocampal formation (images in PACS and I reviewed and I agree there is a very mild asymmetry but also note the patient is not lined up well in scanner and image quality is not high)  MRA brain 11/8/2018 reportedly normal  3/10/2020-EKG sinus rhythm low voltage QRS consider septal infarct  4/15/2020 48 hour holter ordered, ?performed patient does not remember    [facility]io Cal[facility]. There is history of two events of loss of consciousness associated with clonic activity. First associated with marijuana use, second associated with drinking. The patient was placed on lamotrigine but she subsequently chose to stop the medicine. They were documenting a diagnosis of generalized seizures.     Reviewed Dr Jean's notes 1/8/2020 no mention of seizures. Just mention of syncope and dose reduction of propranolol  2/17/2020 Amanuel Nava's note reviewed. Syncope vs Seizure question noted. Referral made to Dr Diaz for second opinion.  4/15/2020 Dr Diaz's note reviewed he recommended EEG (results listed above) and Cardiology work up but I can't see cardiology consult was  placed. 48 hours holter ordered 4/20/2020 but I can't see it was ever performed.     3/27/17 Dr Gallagher [facility] note reviewed. HR in 40s and 50s, reduced propranolol dose from 80 to 60

## 2021-06-08 ENCOUNTER — VIRTUAL VISIT (OUTPATIENT)
Dept: FAMILY MEDICINE | Facility: CLINIC | Age: 26
End: 2021-06-08
Payer: COMMERCIAL

## 2021-06-08 DIAGNOSIS — R56.9 SEIZURE-LIKE ACTIVITY (H): ICD-10-CM

## 2021-06-08 DIAGNOSIS — Z31.69 PRE-CONCEPTION COUNSELING: ICD-10-CM

## 2021-06-08 DIAGNOSIS — G43.719 INTRACTABLE CHRONIC MIGRAINE WITHOUT AURA AND WITHOUT STATUS MIGRAINOSUS: ICD-10-CM

## 2021-06-08 DIAGNOSIS — M54.16 LUMBAR RADICULOPATHY: Primary | ICD-10-CM

## 2021-06-08 PROCEDURE — 99214 OFFICE O/P EST MOD 30 MIN: CPT | Mod: 95 | Performed by: NURSE PRACTITIONER

## 2021-06-08 NOTE — PROGRESS NOTES
Jemima is a 26 year old who is being evaluated via a billable video visit.      How would you like to obtain your AVS? MyChart  If the video visit is dropped, the invitation should be resent by: Text to cell phone: [phone]  Will anyone else be joining your video visit? No    Video Start Time: 5:10pm    Assessment & Plan     Lumbar radiculopathy    - MR Lumbar Spine w/o Contrast; Future    Seizure-like activity (H)    - MR Brain w/o & w Contrast; Future  - NEUROLOGY ADULT REFERRAL    Intractable chronic migraine without aura and without status migrainosus    - MR Brain w/o & w Contrast; Future    Pre-conception counseling    30 minutes spent on the date of the encounter doing chart review, history and exam, documentation and further activities per the note     See Patient Instructions: advised pt to take flexeril at bedtime the next few days. Starting tomorrow- take 600 mg ibuprofen with food tid- until no pain or until through wedding. Schedule with neurologist. Schedule MRIs/ EEG if symptoms not improving. Have a great wedding and move!  Follow up as needed.     No follow-ups on file.    Marsha Hernandez, SHAYY  [facility] JAMARIIRWIN Onofre is a 26 year old who presents for the following health issues     HPI   Patient also would like to discuss conceiving and what medications she can and can't take- reviewed meds with patient and SO via [employer].  Discussed propranolol and nurtec not recommended in pregnancy as well as adderall, advised she schedule with her neurologist to see what meds she can take for her migraines when pregnant. Discussed ecitalopram- recommended mother continue medication if needed for symptoms mamagement. Pt thinks she would feel more anxious taking it while pregnant- discussed how she can taper off this medication- after the wedding. She has her IUD removed. Discussed starting a prenatal vitamin.        Pain  Onset: April- went to urgent care, was told she  had to see orthopedics- Went to [facility]- they did an xray and gave prednisone.  Prednisone finished yesterday, has wedding next week. They discussed MRI if not improving; [facility]  She reports pain is worse with sitting/ laying. No known injury, pain comes and goes, she does have night sweats but no weight loss, no loss of bowel/bladder control.   She reports she was referred to PT from [place] but cant get into there for awhile. She has an acupuncture appointment Friday and a massage scheduled for Monday. Pt's bridgette reports seizure like activity- he has found her shaking x1 with no recollection from patient- but no loss of bladder, no bite marks in mouth; he has also noticed times where she stares off into space and he can't get her attention; and he wonders if this could be seizure activity as well.  Has not had brain MRI in years.  Discussed with neurology and they just wanted to put her on a bunch of seizure meds per pt.  No EEG has been done.     Description:   Location: right leg-upper leg and lower back on right side    Progression of Symptoms: worse    Accompanying Signs & Symptoms:  Other symptoms: none    Precipitating factors:   Trauma or overuse: no   Therapies Tried and outcome: ortho provider, heat, ice, lidocaine patches, topical pain medication, flexeril makes her too tired.     Review of Systems   Constitutional, HEENT, cardiovascular, pulmonary, GI, , musculoskeletal, neuro, skin, endocrine and psych systems are negative, except as otherwise noted.      Objective           Vitals:  No vitals were obtained today due to virtual visit.    Physical Exam   GENERAL: Healthy, alert and no distress  EYES: Eyes grossly normal to inspection.  No discharge or erythema, or obvious scleral/conjunctival abnormalities.  RESP: No audible wheeze, cough, or visible cyanosis.  No visible retractions or increased work of breathing.    SKIN: Visible skin clear. No  significant rash, abnormal pigmentation or lesions.  NEURO: Cranial nerves grossly intact.  Mentation and speech appropriate for age.  PSYCH: Mentation appears normal, affect normal/bright, judgement and insight intact, normal speech and appearance well-groomed.    See orders          Video-Visit Details    Type of service:  Video Visit    Video End Time:5:43 PM    Originating Location (pt. Location): Home    Distant Location (provider location):  [facility] JAMARI     Platform used for Video Visit: [employer]

## 2021-06-20 DIAGNOSIS — G43.009 MIGRAINE WITHOUT AURA AND WITHOUT STATUS MIGRAINOSUS, NOT INTRACTABLE: ICD-10-CM

## 2021-06-21 NOTE — TELEPHONE ENCOUNTER
Rx Authorization:    Requested Medication/ Dose NURTEC 75MG ODT TABLETS    Date last refill ordered: 5/25/21    Quantity ordered: 8 tabs    # refills: 11    Date of last clinic visit with ordering provider: 1/4/21    Date of next clinic visit with ordering provider:     All pertinent protocol data (lab date/result):      Include pertinent information from patients message:

## 2021-06-22 RX ORDER — RIMEGEPANT SULFATE 75 MG/75MG
TABLET, ORALLY DISINTEGRATING ORAL
Qty: 8 TABLET | Refills: 9 | Status: SHIPPED | OUTPATIENT
Start: 2021-06-22 | End: 2021-07-14

## 2021-06-23 ENCOUNTER — TRANSFERRED RECORDS (OUTPATIENT)
Dept: HEALTH INFORMATION MANAGEMENT | Facility: CLINIC | Age: 26
End: 2021-06-23

## 2021-06-24 ENCOUNTER — HOSPITAL ENCOUNTER (OUTPATIENT)
Dept: MRI IMAGING | Facility: CLINIC | Age: 26
Discharge: HOME | End: 2021-06-24
Attending: NURSE PRACTITIONER | Admitting: NURSE PRACTITIONER
Payer: COMMERCIAL

## 2021-06-24 DIAGNOSIS — G43.719 INTRACTABLE CHRONIC MIGRAINE WITHOUT AURA AND WITHOUT STATUS MIGRAINOSUS: ICD-10-CM

## 2021-06-24 DIAGNOSIS — R56.9 SEIZURE-LIKE ACTIVITY (H): ICD-10-CM

## 2021-06-24 PROCEDURE — A9585 GADOBUTROL INJECTION: HCPCS | Performed by: NURSE PRACTITIONER

## 2021-06-24 PROCEDURE — 255N000002 HC RX 255 OP 636: Performed by: NURSE PRACTITIONER

## 2021-06-24 PROCEDURE — 70553 MRI BRAIN STEM W/O & W/DYE: CPT

## 2021-06-24 RX ORDER — GADOBUTROL 604.72 MG/ML
6.5 INJECTION INTRAVENOUS ONCE
Status: COMPLETED | OUTPATIENT
Start: 2021-06-24 | End: 2021-06-24

## 2021-06-24 RX ADMIN — GADOBUTROL 6.5 ML: 604.72 INJECTION INTRAVENOUS at 08:40

## 2021-06-25 NOTE — RESULT ENCOUNTER NOTE
Teja Onofre,    Thank you for your recent office visit.    Here are your recent results.  Per radiology-     IMPRESSION:  1. Unremarkable brain MRI.  2. No definite structural epileptogenic focus identified.     Feel free to contact me via [employer] or call the clinic at [phone].    Sincerely,    HERMES Henderson, FNP-BC

## 2021-07-05 ENCOUNTER — MYC MEDICAL ADVICE (OUTPATIENT)
Dept: FAMILY MEDICINE | Facility: CLINIC | Age: 26
End: 2021-07-05

## 2021-07-05 NOTE — TELEPHONE ENCOUNTER
Should patient schedule with OB for a nurse intake appointment? Or have a confirmation appointment with you?    Thank you,  Iza Milligan RN

## 2021-07-06 ENCOUNTER — OFFICE VISIT (OUTPATIENT)
Dept: FAMILY MEDICINE | Facility: CLINIC | Age: 26
End: 2021-07-06
Payer: COMMERCIAL

## 2021-07-06 VITALS
HEIGHT: 64 IN | HEART RATE: 77 BPM | DIASTOLIC BLOOD PRESSURE: 60 MMHG | WEIGHT: 146.6 LBS | BODY MASS INDEX: 25.03 KG/M2 | SYSTOLIC BLOOD PRESSURE: 108 MMHG | OXYGEN SATURATION: 100 % | TEMPERATURE: 97.8 F

## 2021-07-06 DIAGNOSIS — G43.109 MIGRAINE WITH AURA AND WITHOUT STATUS MIGRAINOSUS, NOT INTRACTABLE: ICD-10-CM

## 2021-07-06 DIAGNOSIS — Z3A.01 LESS THAN 8 WEEKS GESTATION OF PREGNANCY: Primary | ICD-10-CM

## 2021-07-06 DIAGNOSIS — Z32.01 PREGNANCY EXAMINATION OR TEST, POSITIVE RESULT: ICD-10-CM

## 2021-07-06 LAB — HCG UR QL: POSITIVE

## 2021-07-06 PROCEDURE — 99214 OFFICE O/P EST MOD 30 MIN: CPT | Performed by: NURSE PRACTITIONER

## 2021-07-06 PROCEDURE — 81025 URINE PREGNANCY TEST: CPT | Performed by: NURSE PRACTITIONER

## 2021-07-06 RX ORDER — PROPRANOLOL HYDROCHLORIDE 10 MG/1
TABLET ORAL
Qty: 30 TABLET | Refills: 0 | Status: SHIPPED | OUTPATIENT
Start: 2021-07-06 | End: 2021-08-03

## 2021-07-06 ASSESSMENT — MIFFLIN-ST. JEOR: SCORE: 1400.84

## 2021-07-06 ASSESSMENT — PAIN SCALES - GENERAL: PAINLEVEL: NO PAIN (0)

## 2021-07-06 NOTE — TELEPHONE ENCOUNTER
Recommend appointment in clinic for confirmation with Marsha or any available provider.    Chitra SCHMITTC

## 2021-07-06 NOTE — TELEPHONE ENCOUNTER
Information below sent to patient per jesus.    Donna BSN-RN  Triage Nurse  [facility]: [facility]

## 2021-07-06 NOTE — PROGRESS NOTES
"    Assessment & Plan       ICD-10-CM    1. Less than 8 weeks gestation of pregnancy  Z3A.01 HCL URINE PREGNANCY TEST     HCG Qual, Urine ([id number])   2. Pregnancy examination or test, positive result  Z32.01 US OB < 14 Weeks Single     OB/GYN REFERRAL   3. Migraine with aura and without status migrainosus, not intractable  G43.109 propranolol (INDERAL) 10 MG tablet     Congratulated patient on positive pregnancy.  Referral placed to OB/GYN to start OB care.  Order placed for early ultrasound to confirm viability. Discussed with patient obtaining this around 7-8 weeks gestation. (around 7/19)  Discussed early pregnancy with patient and advised to start a prenatal vitamin.  Based on LMP she is 4 weeks 5 days pregnant with an approximate due date of 3/10/2022.         See Patient Instructions    Return in about 7 weeks (around 8/24/2021) for Initial OB appointment.    HERMES [facility]    Mercy Onofre is a 26 year old who presents for the following health issues  accompanied by her spouse:    HPI     Concern - confirmation of pregnancy  Patient's last menstrual period was 06/03/2021.   This was a planned pregnancy. Was \"practicing\" ovulation strips and is now 3 days late.  It was a planned but earlier pregnancy than they were expecting.  She was  2 weeks ago to her now .  This is a welcomed pregnancy and both are excited.  Overall she has been feeling well except for feeling some fatigue.  No previous pregnancy history for patient.  Her spouse has not fathered any children.  She is currently on propanolol for migraine headache prophylasixis.  She takes 60 mg daily.  She would like to know how to reduce use of this.  She does follow with a neurologist/headache doctor for management of her migraine headaches.  She also has a history of ADHD and is not currently taking her extended release Adderall. She has not yet started a prenatal vitamin. " "      Past Medical History:   Diagnosis Date     ADHD (attention deficit hyperactivity disorder), inattentive type      History of migraine headaches     ff Neurology     IUD (intrauterine device) in place      Past Surgical History:   Procedure Laterality Date     FOOT SURGERY Right      Social History     Tobacco Use     Smoking status: Never Smoker     Smokeless tobacco: Never Used   Substance Use Topics     Alcohol use: Never     Frequency: Never     Drug use: Never     Patient Active Problem List   Diagnosis     ADHD (attention deficit hyperactivity disorder), inattentive type     Generalized anxiety disorder     Migraine without aura and without status migrainosus, not intractable     Overweight     Adopted         Review of Systems   Constitutional, HEENT, cardiovascular, pulmonary, gi and gu systems are negative, except as otherwise noted.      Objective    /60 (BP Location: Right arm, Patient Position: Sitting, Cuff Size: Adult Regular)   Pulse 77   Temp 97.8  F (36.6  C) (Tympanic)   Ht 1.627 m (5' 4.06\")   Wt 66.5 kg (146 lb 9.6 oz)   LMP 06/03/2021   SpO2 100%   BMI 25.12 kg/m    Body mass index is 25.12 kg/m .     Physical Exam   GENERAL: healthy, alert and no distress  PSYCH: mentation appears normal, affect normal/bright    Results for orders placed or performed in visit on 07/06/21 (from the past 24 hour(s))   HCG Qual, Urine ([id number])   Result Value Ref Range    HCG Qual Urine Positive (A) NEG^Negative                   "

## 2021-07-06 NOTE — PATIENT INSTRUCTIONS
Patient Education     Pregnancy: Common Questions  There are plenty of myths and  old wives  tales  about pregnancy. You may need help  fact from fiction. On this sheet, you ll find answers to a few common questions. If you have other questions, talk with your healthcare provider.    Will working harm my baby?  In most cases, working throughout your pregnancy is not harmful at all. There may be concerns if the job involves dangerous machinery or chemicals, lifting, or standing for very long periods of time. Talk with your healthcare provider and employer about your particular job and pregnancy.  Is it safe to have sex during pregnancy?  Yes, unless you are specifically advised not to by your healthcare provider.  Why can t I change the cat litter box?  Cats carry a disease called toxoplasmosis. In adult humans, it shows up as a mild infection of the blood and organs. If you are infected during pregnancy, the baby s brain and eyes could be damaged. To be safe, have someone else change the litter. If you must handle it, wear a paper mask over your nose and mouth. Also, wear gloves and wash your hands afterward.  Which medicines are safe?  No prescription or over-the-counter medicine is safe for everyone all of the time. But sometimes medicines are needed. Be sure your healthcare provider knows you are pregnant. Then use only the medicines he or she advises you to take.  Is it true that I can overheat my baby?  Yes. To prevent making your baby too warm:    Don t sit in a Jacuzzi. A long, warm bath is fine, but not in water over 100 F (37.7 C).    Exercise less intensely if you feel tired. Base your workout on how you feel, not your heart rate. Heart rates aren t a good way to measure effort during pregnancy.  Can I lift and carry safely?  Yes, if your healthcare provider doesn t tell you otherwise. Learn to lift and carry safely to prevent injury and reduce back pain during pregnancy. To protect your  back:    Bend at the knees to bring the load nearer.    Get a good . Test the weight of the load.    Tighten your belly. Exhale as you lift.    Lift with your legs, not with your back.    Carry the load close to your body.    Hold the load so you can see where you are going.  What if I get sick?  Most women get sick at least once during pregnancy. Talk with your healthcare provider if you do. Most likely it will not affect your pregnancy. Get plenty of rest and fluids, and eat what you can. Talk with your provider before taking any medicines.  [employer] last reviewed this educational content on 8/1/2020 2000-2021 [employer]. All rights reserved. This information is not intended as a substitute for professional medical care. Always follow your healthcare professional's instructions.

## 2021-07-08 RX ORDER — PROPRANOLOL HYDROCHLORIDE 10 MG/1
TABLET ORAL
Qty: 90 TABLET | Refills: 1 | OUTPATIENT
Start: 2021-07-08

## 2021-07-12 ENCOUNTER — TELEPHONE (OUTPATIENT)
Dept: NEUROLOGY | Facility: CLINIC | Age: 26
End: 2021-07-12

## 2021-07-12 NOTE — TELEPHONE ENCOUNTER
I called pt to discuss her message. I recommended we schedule a follow up visit to review her treatment plan. We scheduled a visit on 8/3 at 9:30am via video. I will message Dr. Lubin as well to see if any changes to treatment need to be made sooner due to pt pregnancy.     Tiffany EASTON

## 2021-07-12 NOTE — TELEPHONE ENCOUNTER
[facility] Call Center    Phone Message    May a detailed message be left on voicemail: yes     Reason for Call: Patient just found out she's expecting.  Would like to speak with Dr. Lubin or the care team in regards to what medications are safe and what the next steps are in her care.  Please reach out to patient.    Action Taken: Message routed to:  [facility] Neuro    Travel Screening: Not Applicable

## 2021-07-13 ENCOUNTER — TELEPHONE (OUTPATIENT)
Dept: NEUROLOGY | Facility: CLINIC | Age: 26
End: 2021-07-13

## 2021-07-14 ENCOUNTER — PRENATAL OFFICE VISIT (OUTPATIENT)
Dept: OBGYN | Facility: CLINIC | Age: 26
End: 2021-07-14

## 2021-07-14 ENCOUNTER — APPOINTMENT (OUTPATIENT)
Dept: OBGYN | Facility: CLINIC | Age: 26
End: 2021-07-14
Payer: COMMERCIAL

## 2021-07-14 DIAGNOSIS — Z34.00 PRENATAL CARE, FIRST PREGNANCY: ICD-10-CM

## 2021-07-14 PROCEDURE — 99207 PR NO CHARGE NURSE ONLY: CPT | Performed by: OBSTETRICS & GYNECOLOGY

## 2021-07-14 RX ORDER — PRENATAL VIT/IRON FUM/FOLIC AC 27MG-0.8MG
1 TABLET ORAL DAILY
COMMUNITY
Start: 2021-05-01

## 2021-07-14 RX ORDER — UBIDECARENONE 200 MG
1 CAPSULE ORAL DAILY
COMMUNITY

## 2021-07-14 NOTE — PROGRESS NOTES
Lifestyle and nutrition teaching completed   [facility] OB Intake Nurse    Patient supplied answers from flow sheet for:  Prenatal OB Questionnaire.  Past Medical History  Have you ever recieved care for your mental health? : No  Have you ever been in a major accident or suffered serious trauma?: No  Within the last year, has anyone hit, slapped, kicked or otherwise hurt you?: No  In the last year, has anyone forced you to have sex when you didn't want to?: No    Past Medical History 2   Have you ever received a blood transfusion?: No  Would you accept a blood transfusion if was medically recommended?: Yes  Does anyone in your home smoke?: No   Is your blood type Rh negative?: No  Have you ever ?: No  Have you been hospitalized for a nonsurgical reason excluding normal delivery?: No  Have you ever had an abnormal pap smear?: No    Past Medical History (Continued)  Do you have a history of abnormalities of the uterus?: No  Did your mother take KULDIP or any other hormones when she was pregnant with you?: Unknown  Do you have any other problems we have not asked about which you feel may be important to this pregnancy?: No

## 2021-07-29 ENCOUNTER — PRENATAL OFFICE VISIT (OUTPATIENT)
Dept: OBGYN | Facility: CLINIC | Age: 26
End: 2021-07-29
Payer: COMMERCIAL

## 2021-07-29 VITALS
TEMPERATURE: 99 F | BODY MASS INDEX: 25.87 KG/M2 | SYSTOLIC BLOOD PRESSURE: 102 MMHG | HEIGHT: 63 IN | WEIGHT: 146 LBS | RESPIRATION RATE: 16 BRPM | DIASTOLIC BLOOD PRESSURE: 52 MMHG | HEART RATE: 84 BPM

## 2021-07-29 DIAGNOSIS — Z34.01 ENCOUNTER FOR PRENATAL CARE IN FIRST TRIMESTER OF FIRST PREGNANCY: Primary | ICD-10-CM

## 2021-07-29 LAB
ABO/RH(D): NORMAL
ALBUMIN UR-MCNC: NEGATIVE MG/DL
ANTIBODY SCREEN: NEGATIVE
APPEARANCE UR: CLEAR
BACTERIA #/AREA URNS HPF: ABNORMAL /HPF
BILIRUB UR QL STRIP: NEGATIVE
COLOR UR AUTO: YELLOW
ERYTHROCYTE [DISTWIDTH] IN BLOOD BY AUTOMATED COUNT: 11.9 % (ref 10–15)
GLUCOSE UR STRIP-MCNC: NEGATIVE MG/DL
HCT VFR BLD AUTO: 33.5 % (ref 35–47)
HGB BLD-MCNC: 11.3 G/DL (ref 11.7–15.7)
HGB UR QL STRIP: NEGATIVE
KETONES UR STRIP-MCNC: NEGATIVE MG/DL
LEUKOCYTE ESTERASE UR QL STRIP: NEGATIVE
MCH RBC QN AUTO: 30.4 PG (ref 26.5–33)
MCHC RBC AUTO-ENTMCNC: 33.7 G/DL (ref 31.5–36.5)
MCV RBC AUTO: 90 FL (ref 78–100)
NITRATE UR QL: NEGATIVE
PH UR STRIP: 6 [PH] (ref 5–7)
PLATELET # BLD AUTO: 220 10E3/UL (ref 150–450)
RBC # BLD AUTO: 3.72 10E6/UL (ref 3.8–5.2)
RBC #/AREA URNS AUTO: ABNORMAL /HPF
SP GR UR STRIP: >=1.03 (ref 1–1.03)
SPECIMEN EXPIRATION DATE: NORMAL
SQUAMOUS #/AREA URNS AUTO: ABNORMAL /LPF
UROBILINOGEN UR STRIP-ACNC: 0.2 E.U./DL
WBC # BLD AUTO: 8.2 10E3/UL (ref 4–11)
WBC #/AREA URNS AUTO: ABNORMAL /HPF

## 2021-07-29 PROCEDURE — 86762 RUBELLA ANTIBODY: CPT | Performed by: OBSTETRICS & GYNECOLOGY

## 2021-07-29 PROCEDURE — 86780 TREPONEMA PALLIDUM: CPT | Performed by: OBSTETRICS & GYNECOLOGY

## 2021-07-29 PROCEDURE — 87491 CHLMYD TRACH DNA AMP PROBE: CPT | Performed by: OBSTETRICS & GYNECOLOGY

## 2021-07-29 PROCEDURE — 86850 RBC ANTIBODY SCREEN: CPT | Performed by: OBSTETRICS & GYNECOLOGY

## 2021-07-29 PROCEDURE — 87340 HEPATITIS B SURFACE AG IA: CPT | Performed by: OBSTETRICS & GYNECOLOGY

## 2021-07-29 PROCEDURE — 87591 N.GONORRHOEAE DNA AMP PROB: CPT | Performed by: OBSTETRICS & GYNECOLOGY

## 2021-07-29 PROCEDURE — 36415 COLL VENOUS BLD VENIPUNCTURE: CPT | Performed by: OBSTETRICS & GYNECOLOGY

## 2021-07-29 PROCEDURE — 85027 COMPLETE CBC AUTOMATED: CPT | Performed by: OBSTETRICS & GYNECOLOGY

## 2021-07-29 PROCEDURE — 81001 URINALYSIS AUTO W/SCOPE: CPT | Performed by: OBSTETRICS & GYNECOLOGY

## 2021-07-29 PROCEDURE — 87389 HIV-1 AG W/HIV-1&-2 AB AG IA: CPT | Performed by: OBSTETRICS & GYNECOLOGY

## 2021-07-29 PROCEDURE — 86900 BLOOD TYPING SEROLOGIC ABO: CPT | Performed by: OBSTETRICS & GYNECOLOGY

## 2021-07-29 PROCEDURE — 86803 HEPATITIS C AB TEST: CPT | Performed by: OBSTETRICS & GYNECOLOGY

## 2021-07-29 PROCEDURE — 76817 TRANSVAGINAL US OBSTETRIC: CPT | Performed by: OBSTETRICS & GYNECOLOGY

## 2021-07-29 PROCEDURE — 86901 BLOOD TYPING SEROLOGIC RH(D): CPT | Performed by: OBSTETRICS & GYNECOLOGY

## 2021-07-29 PROCEDURE — 99207 PR FIRST OB VISIT: CPT | Performed by: OBSTETRICS & GYNECOLOGY

## 2021-07-29 PROCEDURE — 87086 URINE CULTURE/COLONY COUNT: CPT | Performed by: OBSTETRICS & GYNECOLOGY

## 2021-07-29 RX ORDER — CALCIUM CARBONATE 750 MG/1
750 TABLET, CHEWABLE ORAL DAILY
COMMUNITY
End: 2022-03-23

## 2021-07-29 RX ORDER — PYRIDOXINE HCL (VITAMIN B6) 25 MG
25 TABLET ORAL DAILY
Status: ON HOLD | COMMUNITY
End: 2022-03-19

## 2021-07-29 ASSESSMENT — MIFFLIN-ST. JEOR: SCORE: 1381.38

## 2021-07-29 NOTE — PROGRESS NOTES
Discussed physician coverage, tertiary support, diet, exercise, weight gain, schedule of visits, routine and indicated ultrasounds, childbirth education and antepartum testing for certain birth defects.  Encouraged patient to review contents of Prenatal Breastfeeding Education Toolkit. Offered opportunity to answer questions regarding the importance of skin to skin contact, early initiation of exclusive breastfeeding for the first six months and rooming in while in the hospital.    Syphilis is a sexually transmitted disease that can cause birth defects in the babies of untreated mothers. Every pregnant patient is tested for syphilis early in each pregnancy as part of the routine lab work. The [place] Department of [facility] has seen an increase in the rate of syphilis in [place]. The [facility] now recommends testing for syphilis 2 times during a pregnancy, the new prenatal visit, and 28 weeks or when admitted for delivery. Patient accepts lab testing for syphilis.    Group call support for deliveries was discussed, as well as tertiary support in event of high risk issues within [facility].    Discussed current state of COVID-19 in pregnancy, when to seek out emergency care, how to self care at home with milder symptoms.  Discussed COVID vaccine, latest safety information, potential benefits in pregnancy to mom and baby (lower risk for hospitalization and death)        Options for  testing for birth defects were discussed with the patient, generally including CVS testing, Quad screen serum testing, nuchal lucency/blood marker testing, genetic amniocentesis, NIPT testing  and/or level 2 ultrasound.    Current issues include: nausea, mild    Past medical, surgical, social and family histories reviewed on OB questionaire and included on episode summary.  Pertinent review of systems items stated above. See OB questionaire for pertinent components of HPI.    Past Medical History:   Diagnosis  "Date     ADHD (attention deficit hyperactivity disorder), inattentive type      History of migraine headaches     ff Neurology     IUD (intrauterine device) in place      See epic chart    Past Surgical History:   Procedure Laterality Date     FOOT SURGERY Right      See epic chart    Patient Active Problem List    Diagnosis Date Noted     Prenatal care, first pregnancy 07/14/2021     Priority: Medium     Adopted 01/06/2021     Priority: Medium     Migraine without aura and without status migrainosus, not intractable 12/03/2019     Priority: Medium     IMO Update 10/11       ADHD (attention deficit hyperactivity disorder), inattentive type 05/15/2018     Priority: Medium     Generalized anxiety disorder 03/08/2016     Priority: Medium     Overweight 03/08/2016     Priority: Medium       OBJECTIVE: /52 (BP Location: Right arm, Patient Position: Chair, Cuff Size: Adult Regular)   Pulse 84   Temp 99  F (37.2  C) (Tympanic)   Resp 16   Ht 1.6 m (5' 3\")   Wt 66.2 kg (146 lb)   LMP 06/03/2021   Breastfeeding No   BMI 25.86 kg/m      GENERAL APPEARANCE: healthy, alert and no distress  ABDOMEN:  soft, nontender, no hepato-splenomegaly or hernias  PELVIC:  EGBUS:  within normal limits  VAGINA:  normoestrogenic, well-supported, no unusual discharge  CERVIX:  smooth, non-friable, no gross lesions, thin-layer PAP was not taken  UTERUS:  anteverted and enlarged, 8 weeks size  ADNEXAE:  non-tender, no masses palpable, no cul de sac nodularity     NECK: no adenopathy, no asymmetry, masses, or scars and thyroid normal to palpation     RESP: lungs clear to auscultation , respiratory effort WNL     CV: regular rates and rhythm, normal S1 S2, no S3 or S4 and no murmur, click or rub -     SKIN: no suspicious lesions or rashes     PSYCH: mentation appears normal. and affect normal/bright, oriented to person/place/time     LYMPHATICS: No axillary, cervical, inguinal, or supraclavicular nodes    Transvaginal ultrasound was " performed. A live single intrauterine pregnancy was seen.  CRL=1.38 cm, c/w 7 weeks, 5 days.  EDC by sono =03/12/22  EDC by LMP=03/10/22+++    Fetal heart motion was visualized. NMY=241 bpm                    ASSESSMENT:    ICD-10-CM    1. Encounter for prenatal care in first trimester of first pregnancy  Z34.01 US OB <14 Weeks w Transvaginal Single     ABO/Rh type and screen     CBC with platelets     Hepatitis B surface antigen     Rubella Antibody IgG     HIV Antigen Antibody Combo     Treponema Abs w Reflex to RPR and Titer     Hepatitis C Screen Reflex to HCV RNA Quant and Genotype     UA with Microscopic     Urine Culture     Chlamydia trachomatis/Neisseria gonorrhoeae by PCR     Urine Microscopic Exam         PLAN: see orders and OB problem list annotations    Mini Rivera MD

## 2021-07-29 NOTE — NURSING NOTE
"Initial /52 (BP Location: Right arm, Patient Position: Chair, Cuff Size: Adult Regular)   Pulse 84   Temp 99  F (37.2  C) (Tympanic)   Resp 16   Ht 1.6 m (5' 3\")   Wt 66.2 kg (146 lb)   LMP 06/03/2021   Breastfeeding No   BMI 25.86 kg/m   Estimated body mass index is 25.86 kg/m  as calculated from the following:    Height as of this encounter: 1.6 m (5' 3\").    Weight as of this encounter: 66.2 kg (146 lb). .      "

## 2021-07-30 LAB
BACTERIA UR CULT: NO GROWTH
C TRACH DNA SPEC QL PROBE+SIG AMP: NEGATIVE
HBV SURFACE AG SERPL QL IA: NONREACTIVE
HCV AB SERPL QL IA: NONREACTIVE
HIV 1+2 AB+HIV1 P24 AG SERPL QL IA: NONREACTIVE
N GONORRHOEA DNA SPEC QL NAA+PROBE: NEGATIVE
RUBV IGG SERPL QL IA: 0.72 INDEX
RUBV IGG SERPL QL IA: NORMAL
T PALLIDUM AB SER QL: NONREACTIVE

## 2021-08-03 ENCOUNTER — VIRTUAL VISIT (OUTPATIENT)
Dept: NEUROLOGY | Facility: CLINIC | Age: 26
End: 2021-08-03
Payer: COMMERCIAL

## 2021-08-03 DIAGNOSIS — G43.009 MIGRAINE WITHOUT AURA AND WITHOUT STATUS MIGRAINOSUS, NOT INTRACTABLE: Primary | ICD-10-CM

## 2021-08-03 PROCEDURE — 99213 OFFICE O/P EST LOW 20 MIN: CPT | Mod: 95 | Performed by: PSYCHIATRY & NEUROLOGY

## 2021-08-03 NOTE — LETTER
8/3/2021       RE: Jemima Augustine  [address]  [place] [facility] [place] [facility] [place] [address]     Dear Colleague,    Thank you for referring your patient, Jemima Augustine, to the [facility] [place] [facility]. Please see a copy of my visit note below.    [facility]    Jemima Augustine MRN# [id number]   Age: 26 year old YOB: 1995     Requesting physician: No ref. provider found  Marsha Hernandez          Assessment and Plan:     (G43.009) Migraine without aura and without status migrainosus, not intractable  (primary encounter diagnosis)  Comment:   The patient is currently pregnant with her first child. Last dose of Emgality in May. No clear information regarding pregnancy. She will be given info regarding registry data collection.    Plan:   1. Tylenol prn  2. Lifestyle modifications-hydration, sleep, exercise  3. Follow up in March      Ann Marie Lubin MD               History of Present Illness:     CC: Recheck    Jemima is a 26 year old woman who has migraine. She had been seen in May but now has found out she is pregnant. She stopped all medicines  (propranolol, Lexapro, Emgality last dose May, Nurtec late May or early June)  Feeling well so far. Nauseous all day everyday. Ob care at [facility] rotating group of providers.     Current headache frequency is once a week, mild severity lasting for a few hours.              Physical Exam:     LMP 06/03/2021     Deferred         Pertinent History/Data for appointment:     Current Outpatient Medications   Medication Sig Dispense Refill     albuterol (PROVENTIL) 2 MG tablet Take 2 mg by mouth 3 times daily       calcium carbonate 750 MG CHEW Take 750 mg by mouth daily       coenzyme Q-10 200 MG CAPS Take 1 capsule by mouth daily       doxylamine (UNISOM) 25 MG TABS tablet Take 25 mg by mouth At Bedtime       Prenatal Vit-Fe Fumarate-FA (PRENATAL MULTIVITAMIN W/IRON)  27-0.8 MG tablet Take 1 tablet by mouth daily       pyridOXINE (VITAMIN B6) 25 MG tablet Take 25 mg by mouth daily       Vitamin D3 (CHOLECALCIFEROL) 125 MCG (5000 UT) tablet Take 125 mcg by mouth daily             Again, thank you for allowing me to participate in the care of your patient.      Sincerely,    Ann Marie Lubin MD

## 2021-08-03 NOTE — PROGRESS NOTES
Jemima is a 26 year old who is being evaluated via a billable video visit.      How would you like to obtain your AVS? MyChart  If the video visit is dropped, the invitation should be resent by: Send to e-mail at: [email or web address]  Will anyone else be joining your video visit? No    Video Start Time: 9:26 AM  Video-Visit Details    Type of service:  Video Visit    Video End Time: 9:45AM    Originating Location (pt. Location): Home    Distant Location (provider location):  [facility] [place]     Platform used for Video Visit: [employer] [place] [facility]    Jemima Augustine MRN# [id number]   Age: 26 year old YOB: 1995     Requesting physician: No ref. provider found  Marsha Hernandez            Assessment and Plan:     (G43.009) Migraine without aura and without status migrainosus, not intractable  (primary encounter diagnosis)  Comment:   The patient is currently pregnant with her first child. Last dose of Emgality in May. No clear information regarding pregnancy. She will be given info regarding registry data collection.    Plan:   1. Tylenol prn  2. Lifestyle modifications-hydration, sleep, exercise  3. Follow up in March      Ann Marie Lubin MD               History of Present Illness:     CC: Recheck    Jemima is a 26 year old woman who has migraine. She had been seen in May but now has found out she is pregnant. She stopped all medicines  (propranolol, Lexapro, Emgality last dose May, Nurtec late May or early June)  Feeling well so far. Nauseous all day everyday. Ob care at [facility] rotating group of providers.     Current headache frequency is once a week, mild severity lasting for a few hours.              Physical Exam:     LMP 06/03/2021     Deferred         Pertinent History/Data for appointment:     Current Outpatient Medications   Medication Sig Dispense Refill     albuterol (PROVENTIL) 2 MG tablet Take 2 mg by mouth 3 times daily        calcium carbonate 750 MG CHEW Take 750 mg by mouth daily       coenzyme Q-10 200 MG CAPS Take 1 capsule by mouth daily       doxylamine (UNISOM) 25 MG TABS tablet Take 25 mg by mouth At Bedtime       Prenatal Vit-Fe Fumarate-FA (PRENATAL MULTIVITAMIN W/IRON) 27-0.8 MG tablet Take 1 tablet by mouth daily       pyridOXINE (VITAMIN B6) 25 MG tablet Take 25 mg by mouth daily       Vitamin D3 (CHOLECALCIFEROL) 125 MCG (5000 UT) tablet Take 125 mcg by mouth daily

## 2021-08-20 ENCOUNTER — OFFICE VISIT (OUTPATIENT)
Dept: URGENT CARE | Facility: URGENT CARE | Age: 26
End: 2021-08-20
Payer: COMMERCIAL

## 2021-08-20 VITALS
DIASTOLIC BLOOD PRESSURE: 62 MMHG | HEART RATE: 67 BPM | SYSTOLIC BLOOD PRESSURE: 99 MMHG | BODY MASS INDEX: 25.79 KG/M2 | OXYGEN SATURATION: 100 % | WEIGHT: 145.6 LBS | TEMPERATURE: 98.1 F

## 2021-08-20 DIAGNOSIS — R21 RASH AND NONSPECIFIC SKIN ERUPTION: Primary | ICD-10-CM

## 2021-08-20 PROCEDURE — 99213 OFFICE O/P EST LOW 20 MIN: CPT | Performed by: PHYSICIAN ASSISTANT

## 2021-08-20 ASSESSMENT — PAIN SCALES - GENERAL: PAINLEVEL: NO PAIN (0)

## 2021-08-20 NOTE — PROGRESS NOTES
"    Assessment & Plan     Rash and nonspecific skin eruption  Reassurance, no rash at the moment. Continue to monitor. If symptoms return can try over the counter calritin or zyrtec as needed for itching. Can also add pepcid or zantac two times daily as needed for itching. Return to clinic if symptoms worsen or do not improve; otherwise follow up as needed                 BMI:   Estimated body mass index is 25.79 kg/m  as calculated from the following:    Height as of 7/29/21: 1.6 m (5' 3\").    Weight as of this encounter: 66 kg (145 lb 9.6 oz).           Return in about 1 week (around 8/27/2021), or if symptoms worsen or fail to improve.    Alayna Barnett PA-C  [facility] [place]    Subjective   Chief Complaint   Patient presents with     Hives     Patient started to break out in hives this morning. She breaks out and then they are gone. She is pregnant and worried at this time.         HPI     Derm problem   Onset of symptoms was 1 day(s) ago.  Course of illness is improving.    Severity moderate  Current and Associated symptoms: rash on legs and arms   Treatment measures tried include None tried.  Predisposing factors include None.              Review of Systems   Constitutional, HEENT, cardiovascular, pulmonary, gi and gu systems are negative, except as otherwise noted.      Objective    BP 99/62 (BP Location: Left arm, Patient Position: Sitting, Cuff Size: Adult Small)   Pulse 67   Temp 98.1  F (36.7  C) (Tympanic)   Wt 66 kg (145 lb 9.6 oz)   LMP 06/03/2021   SpO2 100%   BMI 25.79 kg/m    Body mass index is 25.79 kg/m .  Physical Exam  Constitutional:       Appearance: She is well-developed.   HENT:      Head: Normocephalic.      Right Ear: Tympanic membrane and ear canal normal.      Left Ear: Tympanic membrane and ear canal normal.   Eyes:      Conjunctiva/sclera: Conjunctivae normal.   Cardiovascular:      Rate and Rhythm: Normal rate.      Heart sounds: Normal heart sounds. "   Pulmonary:      Effort: Pulmonary effort is normal.      Breath sounds: Normal breath sounds.   Skin:     General: Skin is warm and dry.      Findings: No rash.   Psychiatric:         Behavior: Behavior normal.

## 2021-08-26 ENCOUNTER — PRENATAL OFFICE VISIT (OUTPATIENT)
Dept: OBGYN | Facility: CLINIC | Age: 26
End: 2021-08-26
Payer: COMMERCIAL

## 2021-08-26 VITALS
RESPIRATION RATE: 12 BRPM | DIASTOLIC BLOOD PRESSURE: 69 MMHG | SYSTOLIC BLOOD PRESSURE: 109 MMHG | BODY MASS INDEX: 25.56 KG/M2 | HEIGHT: 64 IN | WEIGHT: 149.7 LBS | TEMPERATURE: 98.2 F

## 2021-08-26 DIAGNOSIS — Z34.01 ENCOUNTER FOR PRENATAL CARE IN FIRST TRIMESTER OF FIRST PREGNANCY: Primary | ICD-10-CM

## 2021-08-26 DIAGNOSIS — R51.9 PREGNANCY HEADACHE IN FIRST TRIMESTER: ICD-10-CM

## 2021-08-26 DIAGNOSIS — O26.891 PREGNANCY HEADACHE IN FIRST TRIMESTER: ICD-10-CM

## 2021-08-26 PROCEDURE — 99207 PR PRENATAL VISIT: CPT | Performed by: OBSTETRICS & GYNECOLOGY

## 2021-08-26 PROCEDURE — 36415 COLL VENOUS BLD VENIPUNCTURE: CPT | Performed by: OBSTETRICS & GYNECOLOGY

## 2021-08-26 RX ORDER — ACETAMINOPHEN 325 MG/1
325-650 TABLET ORAL EVERY 6 HOURS PRN
COMMUNITY

## 2021-08-26 ASSESSMENT — MIFFLIN-ST. JEOR: SCORE: 1414.03

## 2021-08-26 NOTE — PROGRESS NOTES
"[facility]    Return OB Note    CC: Return OB     Subjective:  Jemima is a 26 year old  at 12w0d   Denies vaginal bleeding, loss of fluid, or pelvic cramping. No fetal movement yet.  Complaints today: Headaches, taking tylenol already. Also a few episodes of lightheadedness, no other symptoms, pulse has been normal, no SOB, chest pain, palpitations. Discussed hydration.    Objective:  /69 (BP Location: Right arm, Patient Position: Sitting, Cuff Size: Adult Regular)   Temp 98.2  F (36.8  C) (Tympanic)   Resp 12   Ht 1.626 m (5' 4\")   Wt 67.9 kg (149 lb 11.2 oz)   LMP 2021   BMI 25.70 kg/m      FHT: 176bpm    Assessment/Plan:   Encounter Diagnosis   Name Primary?     Encounter for prenatal care in first trimester of first pregnancy Yes       IUP at 12w0d  -Intermittent rash on hands, arms: has happened twice. Resolved with benadryl. No known inciting factors. Discussed benadryl and non drowsy antihistamines as needed.  -Headaches: Rx for Magnesium oxide. Discussed Reglan if this still isn't helping  -NOB labs normal, rubella non immune, plan for MMR post partum  -Genetic screening: desires NIPT, ordered today       RTC 4 weeks    Breanna Ceuva,     "

## 2021-08-26 NOTE — NURSING NOTE
"Initial /69 (BP Location: Right arm, Patient Position: Sitting, Cuff Size: Adult Regular)   Temp 98.2  F (36.8  C) (Tympanic)   Resp 12   Ht 1.626 m (5' 4\")   Wt 67.9 kg (149 lb 11.2 oz)   LMP 06/03/2021   BMI 25.70 kg/m   Estimated body mass index is 25.7 kg/m  as calculated from the following:    Height as of this encounter: 1.626 m (5' 4\").    Weight as of this encounter: 67.9 kg (149 lb 11.2 oz). .      "

## 2021-08-30 ENCOUNTER — OFFICE VISIT (OUTPATIENT)
Dept: OBGYN | Facility: CLINIC | Age: 26
End: 2021-08-30
Payer: COMMERCIAL

## 2021-08-30 ENCOUNTER — TELEPHONE (OUTPATIENT)
Dept: OBGYN | Facility: CLINIC | Age: 26
End: 2021-08-30

## 2021-08-30 VITALS
DIASTOLIC BLOOD PRESSURE: 63 MMHG | WEIGHT: 147 LBS | SYSTOLIC BLOOD PRESSURE: 108 MMHG | BODY MASS INDEX: 25.23 KG/M2 | HEART RATE: 64 BPM

## 2021-08-30 DIAGNOSIS — Z34.01 ENCOUNTER FOR PRENATAL CARE IN FIRST TRIMESTER OF FIRST PREGNANCY: ICD-10-CM

## 2021-08-30 DIAGNOSIS — R30.0 DYSURIA: Primary | ICD-10-CM

## 2021-08-30 DIAGNOSIS — M54.50 ACUTE MIDLINE LOW BACK PAIN WITHOUT SCIATICA: ICD-10-CM

## 2021-08-30 LAB

## 2021-08-30 PROCEDURE — 87210 SMEAR WET MOUNT SALINE/INK: CPT | Performed by: OBSTETRICS & GYNECOLOGY

## 2021-08-30 PROCEDURE — 99214 OFFICE O/P EST MOD 30 MIN: CPT | Performed by: OBSTETRICS & GYNECOLOGY

## 2021-08-30 PROCEDURE — 81003 URINALYSIS AUTO W/O SCOPE: CPT | Performed by: OBSTETRICS & GYNECOLOGY

## 2021-08-30 NOTE — PROGRESS NOTES
Jemima is a 26 year old  at 12 weeks 4 days referred here by self for consultation regarding lower back pains x 4 days. Last seen for prenatal visit 5 days ago. Describe lowe back pains as 8/10 with no relief from tylenol. No pelvic pain or bleeding , but has vaginal discharge..    ROS: Ten point review of systems was reviewed and negative except the above.    Gyne: - abn pap (last pap ), - STD's    Past Medical History:   Diagnosis Date     ADHD (attention deficit hyperactivity disorder), inattentive type      History of migraine headaches     ff Neurology     IUD (intrauterine device) in place      Past Surgical History:   Procedure Laterality Date     FOOT SURGERY Right      Patient Active Problem List   Diagnosis     ADHD (attention deficit hyperactivity disorder), inattentive type     Generalized anxiety disorder     Migraine without aura and without status migrainosus, not intractable     Overweight     Adopted     Prenatal care, first pregnancy       ALL/Meds: Her medication and allergy histories were reviewed and are documented in their appropriate chart areas.    SH: - tob, - EtOH,     FH: Her family history was reviewed and documented in its appropriate chart area.    PE: /63   Pulse 64   Wt 66.7 kg (147 lb)   LMP 2021   Breastfeeding No   BMI 25.23 kg/m    Body mass index is 25.23 kg/m .    General Appearance:  healthy, alert, active, no distress  HEENT: NCAT  Abdomen: Soft, nontender.  Normal bowel sounds.  No masses  BACK: NO CVA tenderness. Minimal Lumbar tenderness  Pelvic:       SSSE: Cervix is closed  Results for orders placed or performed in visit on 21   UA Macro with Reflex to Micro and Culture - lab collect     Status: Normal    Specimen: Urine, Midstream   Result Value Ref Range    Color Urine Yellow Colorless, Straw, Light Yellow, Yellow    Appearance Urine Clear Clear    Glucose Urine Negative Negative mg/dL    Bilirubin Urine Negative Negative    Ketones Urine  Negative Negative mg/dL    Specific Gravity Urine >=1.030 1.003 - 1.035    Blood Urine Negative Negative    pH Urine 5.5 5.0 - 7.0    Protein Albumin Urine Negative Negative mg/dL    Urobilinogen Urine 0.2 0.2, 1.0 E.U./dL    Nitrite Urine Negative Negative    Leukocyte Esterase Urine Negative Negative    Narrative    Microscopic not indicated   Wet prep - Clinic Collect     Status: Abnormal    Specimen: Vagina; Swab   Result Value Ref Range    Trichomonas Absent Absent    Yeast Absent Absent    Clue Cells Absent Absent    WBCs/high power field 1+ (A) None     POSITIVE FHR and MOVEMENT on bedside ultrasound    NURSE FOR EXAM.  A/P    ICD-10-CM    1. Dysuria  R30.0 UA Macro with Reflex to Micro and Culture - lab collect     UA Macro with Reflex to Micro and Culture - lab collect     Wet prep - Clinic Collect   2. Encounter for prenatal care in first trimester of first pregnancy  Z34.01 Wet prep - Clinic Collect   3. Acute midline low back pain without sciatica  M54.5 Wet prep - Clinic Collect    Normal labs.  Suspect lumbersacral pain.  May use short course of NSAIDS for 3 days.  If no improvement she will F/U with her OB provider,    Total time preparing to see patient with reviewing prior encounter and labs, face to face time,  and coordinating care on the same calendar date:30 mins.  CEPHAS AGBEH, MD.

## 2021-08-30 NOTE — PATIENT INSTRUCTIONS
If you have any questions regarding your visit, Please contact your care team.  [email or web address] Access Services: [phone]  Women s Health CLINIC HOURS TELEPHONE NUMBER   Cephas Agbeh, M.D. Becky-RN Kylie-RN Heidi-RN Deanna-MA Angela-  Cara-         Monday-[place]    8:00a.m-4:45 p.m    Tuesday--[place]     8:00a.m-4:45 p.m.    Thursday-[place]    8:00a.m-4:45 p.m.    Friday-[place]    8:00a.m-4:45 p.m    [facility]   [address]   [place] [address]   [phone]   Fax [phone]   [phone]     [facility]   [address] [place] [address] [place] [address]   [phone]    [facility]  [address] [facility] [address]  [phone]  [id number]-[phone]   Urgent Care locations:    [facility] Monday-Friday  5 pm - 9 pm  Saturday and Sunday   9 am - 5 pm   Monday-Friday   5 pm - 9 pm  Saturday and Sunday  9 am - 5 pm    [phone]   If you need a medication refill, please contact your pharmacy. Please allow 3 business days for your refill to be completed.  As always, Thank you for trusting us with your healthcare needs!

## 2021-08-30 NOTE — TELEPHONE ENCOUNTER
Spoke to patient and she is 12 weeks gestation and she has been having low back pain that has been increasing over the weekend. She denied any injury to her back that she can remember. No other changes including no bleeding, cramping, fever, or nausea. She has been taking tylenol every six hours that takes the edge off. She has been alternating hot and cold with not much relief. She has noticed more pain when going from sitting to standing position. She is unsure if this could be muscle related. Pain rated at 8-9 of 10 currently. She is wondering if she should come to clinic or be seen. Please review and advise.  Floridalma SHEFFIELD RN BSN PHN  Specialty Clinics

## 2021-08-30 NOTE — RESULT ENCOUNTER NOTE
Ms. Augustine,    All of your labs were normal for you, as discussed at your appointment today.    Please contact the clinic if you have additional questions.  Thank you.    Sincerely,    Cephas Mawuena Agbeh, MD

## 2021-08-30 NOTE — TELEPHONE ENCOUNTER
Carmelina,     Are you able to speak with the patient and triage? If she is doing everything she can at home with the heat/ice, Tylenol, stretching and it isn't working, she likely needs to be evaluated. If she doesn't have any OB symptoms (no bleeding, cramping), she should likely be evaluated by ER/PCP. There also isn't much availability in our clinic today, so might need to go to ED if in severe pain.     Thanks,   Breanna     Message text    Patient was advised to be further evaluated by PCP or Urgent Care if needed.    Carmelina Guillen   Ob/Gyn Clinic  RN

## 2021-08-31 LAB — SCANNED LAB RESULT: NORMAL

## 2021-08-31 NOTE — RESULT ENCOUNTER NOTE
Pt notified of below.  Pt reports understanding.  Pt does not have further questions or concerns.  Gender revealed per patient request - girl.    Carmelina Guillen   Ob/Gyn Clinic  RN

## 2021-08-31 NOTE — RESULT ENCOUNTER NOTE
Call to pt to notify of below.  Unable to reach.  Left message for pt to call back.  Will ask if patient desires to know gender of baby.    Carmelina Guillen   Ob/Gyn Clinic  RN

## 2021-09-05 ENCOUNTER — MYC MEDICAL ADVICE (OUTPATIENT)
Dept: FAMILY MEDICINE | Facility: CLINIC | Age: 26
End: 2021-09-05

## 2021-09-06 NOTE — TELEPHONE ENCOUNTER
Note: patient is currently pregnant.     Izzy Oviedo RN BSN  [facility]

## 2021-09-08 ENCOUNTER — HOSPITAL ENCOUNTER (EMERGENCY)
Facility: CLINIC | Age: 26
Discharge: HOME | End: 2021-09-08
Payer: COMMERCIAL

## 2021-09-08 ENCOUNTER — TELEPHONE (OUTPATIENT)
Dept: OBGYN | Facility: CLINIC | Age: 26
End: 2021-09-08

## 2021-09-08 NOTE — TELEPHONE ENCOUNTER
"I would suggest she go to ER; they can give her a migraine \"cocktail\"   Mini Mericle    Message text    Call to pt to notify of below.  Call to pt to notify of above.  Unable to reach.  Left message for pt to call back.  Recommendation is to go to the ER for treatment.    Carmelina Guillen   Ob/Gyn Clinic  RN    "

## 2021-09-08 NOTE — TELEPHONE ENCOUNTER
S-(situation): Migraine headache for 24 hours, symptoms are very normal for her with migraine. Patient used to be on propranolol for prophylactic treatment but stopped when she found out she was pregnant. This is her first migraine that she has had since being pregnant.    Tylenol is not helping. Patient has been taking her magnesium regularly. Also, discussed with Dr. Anay Vasquez if this does not help. Patient unsure if that would help with migraine.    Patient reports light and sound sensitive.  Patient reports + nausea but denies vomiting.    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    Patient denies pregnancy concerns.    B-(background):  at 13w6d    A-(assessment): migraine headache    R-(recommendations): Reassurance provided. Comfort measures, dark quiet room, sleep, warm tub bath. Ice at the base of neck. Patient willing to try one serving of caffeine. Increase oral hydration, food intake as tolerated. Reviewed some stress reducing activities. Discuss with provider at next clinic visit if these become more frequent, if alternative preventative medication would be an option.    Patient thinking of going to the ER.    Please review and advise on other alternative treatemtns for patient.    Thank you.    Carmelina Guillen   Ob/Gyn Clinic  RN

## 2021-09-15 ENCOUNTER — E-VISIT (OUTPATIENT)
Dept: FAMILY MEDICINE | Facility: CLINIC | Age: 26
End: 2021-09-15
Payer: COMMERCIAL

## 2021-09-15 ENCOUNTER — TELEPHONE (OUTPATIENT)
Dept: OBGYN | Facility: CLINIC | Age: 26
End: 2021-09-15

## 2021-09-15 DIAGNOSIS — Z33.1 PREGNANT STATE, INCIDENTAL: ICD-10-CM

## 2021-09-15 DIAGNOSIS — M54.50 ACUTE BILATERAL LOW BACK PAIN WITHOUT SCIATICA: Primary | ICD-10-CM

## 2021-09-15 PROCEDURE — 99421 OL DIG E/M SVC 5-10 MIN: CPT | Performed by: NURSE PRACTITIONER

## 2021-09-15 NOTE — TELEPHONE ENCOUNTER
Reason for Call:  Other appointment    Detailed comments: Pt states she tried to get appt through appt line and was unable. Pt would like to get fit in today, if possible.    Phone Number Patient can be reached at: Home number on file [phone] (home) -can also send message through [email or web address]. 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    Best Time: any    Can we leave a detailed message on this number? YES    Call taken on 9/15/2021 at 12:51 PM by Georges Anderson

## 2021-09-15 NOTE — PATIENT INSTRUCTIONS
Patient Education     Relieving Back Pain During Pregnancy: Wall Stretch, Body Bend  Before trying these exercises, talk to your healthcare provider to make sure they are safe for you. Ask your healthcare provider how many times to do each exercise.      Wall stretch Body bend   Wall stretch  This strengthens and loosens the muscles in your upper back:  1. Lean against a wall with a firm pillow or rolled towel under your shoulder blades. Your feet should be about 12 inches from the wall and shoulder-width apart. Point your chin down.  2. Breathe in. Push your shoulders, neck, and head against the wall. You will feel a stretch in your shoulders.  3. Hold for 5 counts. Then breathe out, and relax your shoulders and neck.  Body bend  This strengthens your back and buttocks muscles:  1. Stand with your legs shoulder-width apart. Put your hands on your upper thighs and bend your knees slightly.  2. Slowly bend forward at the hips. Push your hips back and keep your shoulders up. Make sure your back is straight. You ll feel a stretch in your upper thighs. You ll also feel your back muscles holding you in position.  3. Hold for 5 counts, then straighten.  [employer] last reviewed this educational content on 2/1/2018 2000-2021 [employer]. All rights reserved. This information is not intended as a substitute for professional medical care. Always follow your healthcare professional's instructions.           Patient Education     Tailor Sit, Trunk Turn for Back Pain During Pregnancy  Before trying these exercises, talk to your healthcare provider to make sure they are safe for you. Ask your healthcare provider how many times to do each exercise.      Tailor sit Trunk turns   Tailor sit  This exercise makes your thigh, pelvic, and hip muscles more flexible.  4. Sit on the floor with the soles of your feet together. Your back should be straight.  5. Gently lean forward until you feel a mild stretch in your hip  and thigh muscles. Your back should remain straight. Don t push down on your legs with your hands.  6. Hold and count to 5, then relax.  Trunk turns  This helps make your trunk (from your shoulders to your hips) more flexible.  4. Sit on the floor with your legs crossed. Your back should be straight.  5. Put your left hand on your right knee. Rest your right hand on the floor to support yourself and help you balance.  6. Slowly twist right. To do this, turn your head, shoulders, and chest as far right as you comfortably can. Keep your hips, knees, and feet in place.  7. Hold for 5 counts. Then change sides and slowly twist left.  [employer] last reviewed this educational content on 2/1/2018 2000-2021 [employer]. All rights reserved. This information is not intended as a substitute for professional medical care. Always follow your healthcare professional's instructions.           Patient Education     Back Pain During Pregnancy: Moving Safely  Learning the proper ways to bend, lift, and carry objects may help relieve back strain. It will also help you protect your back after your baby is born. Remember, if you re having trouble protecting your back, it s OK to ask the people around you for help!       Bending Lifting Carrying   Bending  To protect your back as you bend:    Put 1 foot slightly in front of the other. Bend at the knees and hips, pushing your hips backward. Keep your upper body as straight as you can.    Face forward. Try to keep your ears, shoulders, and hips in a line.    Don t hold your breath.  Lifting  To lift a large object or a child:    Get as close to the load as you can. Face forward, to help keep your ears and shoulders aligned.    Use the muscles in your thighs and buttocks to stand up. As you lift, tighten your stomach and pelvic floor muscles.    Don t hold your breath. Avoid twisting.  Carrying  To carry a load safely:    Carry an object or child in front of you, not  resting on your hip.    Break up your load into 2 smaller bags, if you can. Carry 1 bag on each side to maintain balance. Or, break the load into smaller ones and take more trips.    Try to tighten your stomach and pelvic floor muscles as you walk. This helps take weight off your back.  [employer] last reviewed this educational content on 2/1/2018 2000-2021 [employer]. All rights reserved. This information is not intended as a substitute for professional medical care. Always follow your healthcare professional's instructions.           Patient Education     Back Pain During Pregnancy: Positioning Yourself     When standing, resting a foot on a low block can ease back pain.   You likely position yourself differently now than you did before you were pregnant. Did you know that standing, sitting, or lying in certain ways can lead to back pain? To ease pain, use positions that support your body comfortably.   Tips for good posture  Using good posture means holding yourself so that your spine is aligned and your muscles can work without strain. To use good posture:    Raise your chest and head. Try to keep your ears lined up over your shoulders.    Use your stomach muscles to pull in your stomach. This reduces the amount of weight your back must support.    Keep your pelvis level. Think of your pelvis as a bowl of water that will spill if it tips too far forward or backward.  Standing  If you must stand for long periods, try to change positions every 15 minutes. This gives your muscles a break. When standing, also:    Keep your legs slightly apart. This helps you balance your weight.    Rest one foot on a book, ledge, or low stool. Every few minutes, switch legs.    Wear comfortable shoes with padded soles and arch support, like athletic shoes.  Sitting  When sitting in a chair or car, make sure your spine s lumbar curve is supported. Use a chair with lumbar support built in, or put a firm pillow against  your lower back. Also try the following:    Sit with your knees slightly lower than your hips. Don t cross your legs.    Take deep breaths often. This helps keep your spine and stomach in the best position.    Vary your activity each hour. For instance, get up from your desk and take a 5-minute walk around the office.  Lying down  To lie safely and comfortably:    Lie on your side with your knees slightly bent. This takes pressure off your uterus and improves blood flow to your baby.    Place pillows under your abdomen and between your knees.    To get out of bed, roll onto your side. Use your arms to push yourself into a seated position. Scoot to the edge of the bed and place your feet on the floor. Lean forward, then use your leg muscles to stand.    Consider investing in a firm mattress.  Lifting  Tip to safely lift:    Do not bend over from the waist to pick things up-squat down, bend your knees, and keep your back straight.  [employer] last reviewed this educational content on 2/1/2018 2000-2021 [employer]. All rights reserved. This information is not intended as a substitute for professional medical care. Always follow your healthcare professional's instructions.           Patient Education     Relieving Back Pain  Back pain is a common problem. You can strain back muscles by lifting too much weight or just by moving the wrong way. Back strain can be uncomfortable, even painful. And it can take weeks or months to improve. To help yourself feel better and prevent future back strains, try these tips.  Important: Don't give aspirin to children or teens without first discussing it with your child's healthcare provider.  Ice    Ice reduces muscle pain and swelling. It helps most during the first 24 to 48 hours after an injury.    Wrap an ice pack or a bag of frozen peas in a thin towel. Never put ice directly on your skin.    Place the ice where your back hurts the most.    Don t ice for more than  20 minutes at a time.    You can use ice several times a day.  Medicines  Over-the-counter pain relievers include acetaminophen and anti-inflammatory medicines, which includes aspirin, naproxen, or ibuprofen. They can help ease discomfort. Some also reduce swelling.    Tell your healthcare provider about any medicines you are already taking.    Take medicines only as directed.  Manipulation and massage  Having manipulation by an osteopathic doctor or chiropractor may be helpful. Getting a massage also may help.   Heat  After the first 48 hours, heat can relax sore muscles and improve blood flow.    Try a warm bath or shower. Or use a heating pad set on low. To prevent a burn, keep a cloth between you and the heating pad.    Don t use a heating pad for more than 15 minutes at a time. Never sleep on a heating pad.  [employer] last reviewed this educational content on 6/1/2018 2000-2021 [employer]. All rights reserved. This information is not intended as a substitute for professional medical care. Always follow your healthcare professional's instructions.

## 2021-09-15 NOTE — TELEPHONE ENCOUNTER
Return call to pt.  Unable to reach.  Unable to leave a message.  Will send a MyChart message.    Patient needs evaluation for back pain by Family Practice/Primary Care Provider.  Patient need to go to Urgent Care if unable to get in to clinic for appointment.    Carmelina Guillen   [facility]  RN

## 2021-09-16 ENCOUNTER — MYC MEDICAL ADVICE (OUTPATIENT)
Dept: FAMILY MEDICINE | Facility: CLINIC | Age: 26
End: 2021-09-16

## 2021-09-19 ENCOUNTER — MYC MEDICAL ADVICE (OUTPATIENT)
Dept: FAMILY MEDICINE | Facility: CLINIC | Age: 26
End: 2021-09-19

## 2021-09-21 NOTE — TELEPHONE ENCOUNTER
Patient was evaluated in orthopedic urgent care over the weekend.    Carmelina Guillen   [facility]  RN

## 2021-09-23 ENCOUNTER — PRENATAL OFFICE VISIT (OUTPATIENT)
Dept: OBGYN | Facility: CLINIC | Age: 26
End: 2021-09-23
Payer: COMMERCIAL

## 2021-09-23 VITALS
TEMPERATURE: 99 F | WEIGHT: 151.8 LBS | HEIGHT: 64 IN | SYSTOLIC BLOOD PRESSURE: 123 MMHG | DIASTOLIC BLOOD PRESSURE: 68 MMHG | BODY MASS INDEX: 25.92 KG/M2 | HEART RATE: 82 BPM | RESPIRATION RATE: 12 BRPM

## 2021-09-23 DIAGNOSIS — Z23 NEED FOR PROPHYLACTIC VACCINATION AND INOCULATION AGAINST INFLUENZA: ICD-10-CM

## 2021-09-23 DIAGNOSIS — Z34.00 ENCOUNTER FOR SUPERVISION PREGNANCY IN PRIMIGRAVIDA, ANTEPARTUM: Primary | ICD-10-CM

## 2021-09-23 DIAGNOSIS — G43.009 MIGRAINE WITHOUT AURA AND WITHOUT STATUS MIGRAINOSUS, NOT INTRACTABLE: ICD-10-CM

## 2021-09-23 PROCEDURE — 90686 IIV4 VACC NO PRSV 0.5 ML IM: CPT | Performed by: OBSTETRICS & GYNECOLOGY

## 2021-09-23 PROCEDURE — 90471 IMMUNIZATION ADMIN: CPT | Performed by: OBSTETRICS & GYNECOLOGY

## 2021-09-23 PROCEDURE — 99207 PR PRENATAL VISIT: CPT | Performed by: OBSTETRICS & GYNECOLOGY

## 2021-09-23 RX ORDER — METOCLOPRAMIDE 10 MG/1
10 TABLET ORAL 3 TIMES DAILY PRN
Qty: 10 TABLET | Refills: 0 | Status: ON HOLD | OUTPATIENT
Start: 2021-09-23 | End: 2022-03-19

## 2021-09-23 ASSESSMENT — MIFFLIN-ST. JEOR: SCORE: 1423.56

## 2021-09-23 NOTE — PROGRESS NOTES
"CC: Here for routine prenatal visit @ 16w0d   HPI: no cramping or bleeding.  Having migraines.  Did ok with reglan.     PE: /68 (BP Location: Right arm, Patient Position: Sitting, Cuff Size: Adult Regular)   Pulse 82   Temp 99  F (37.2  C) (Tympanic)   Resp 12   Ht 1.626 m (5' 4\")   Wt 68.9 kg (151 lb 12.8 oz)   LMP 06/03/2021   BMI 26.06 kg/m     See OB flowsheet      A/P G1 @ 16w0d normal pregnancy    1. Routine prenatal care.  COVID restrictions and recommendations reviewed including iron supplementation.  Flu shot today.    2. Migraines: reglan ordered    RTC 4 weeks.      Izzy Nelson M.D.    "

## 2021-09-23 NOTE — NURSING NOTE
"Initial /68 (BP Location: Right arm, Patient Position: Sitting, Cuff Size: Adult Regular)   Pulse 82   Temp 99  F (37.2  C) (Tympanic)   Resp 12   Ht 1.626 m (5' 4\")   Wt 68.9 kg (151 lb 12.8 oz)   LMP 06/03/2021   BMI 26.06 kg/m   Estimated body mass index is 26.06 kg/m  as calculated from the following:    Height as of this encounter: 1.626 m (5' 4\").    Weight as of this encounter: 68.9 kg (151 lb 12.8 oz). .      "

## 2021-10-04 ENCOUNTER — TRANSFERRED RECORDS (OUTPATIENT)
Dept: HEALTH INFORMATION MANAGEMENT | Facility: CLINIC | Age: 26
End: 2021-10-04

## 2021-10-07 ENCOUNTER — DOCUMENTATION ONLY (OUTPATIENT)
Dept: OTHER | Facility: CLINIC | Age: 26
End: 2021-10-07

## 2021-10-14 ENCOUNTER — PRENATAL OFFICE VISIT (OUTPATIENT)
Dept: OBGYN | Facility: CLINIC | Age: 26
End: 2021-10-14
Payer: COMMERCIAL

## 2021-10-14 VITALS
DIASTOLIC BLOOD PRESSURE: 61 MMHG | HEIGHT: 64 IN | WEIGHT: 153 LBS | RESPIRATION RATE: 18 BRPM | HEART RATE: 79 BPM | SYSTOLIC BLOOD PRESSURE: 109 MMHG | TEMPERATURE: 97.6 F | BODY MASS INDEX: 26.12 KG/M2

## 2021-10-14 DIAGNOSIS — Z34.02 ENCOUNTER FOR PRENATAL CARE IN SECOND TRIMESTER OF FIRST PREGNANCY: Primary | ICD-10-CM

## 2021-10-14 PROCEDURE — 99207 PR PRENATAL VISIT: CPT | Performed by: OBSTETRICS & GYNECOLOGY

## 2021-10-14 ASSESSMENT — MIFFLIN-ST. JEOR: SCORE: 1429

## 2021-10-14 NOTE — PROGRESS NOTES
"CC: Here for routine prenatal visit @ 19w0d   HPI:  Still not feeling any movement; has ultrasound next week; discussed process for 2 hr GTT; enforced importance of hydration    PE: /61 (BP Location: Right arm, Patient Position: Chair, Cuff Size: Adult Regular)   Pulse 79   Temp 97.6  F (36.4  C) (Tympanic)   Resp 18   Ht 1.626 m (5' 4\")   Wt 69.4 kg (153 lb)   LMP 06/03/2021   BMI 26.26 kg/m     See OB flowsheet      A:  1. Encounter for prenatal care in second trimester of first pregnancy    - Glucose tolerance gest screen 1 hour; Future  - CBC with platelets; Future  - Treponema Abs w Reflex to RPR and Titer; Future      Routine prenatal care  RTC 4 weeks.      Mini Rivera M.D.     "

## 2021-10-14 NOTE — NURSING NOTE
"Initial /61 (BP Location: Right arm, Patient Position: Chair, Cuff Size: Adult Regular)   Pulse 79   Temp 97.6  F (36.4  C) (Tympanic)   Resp 18   Ht 1.626 m (5' 4\")   Wt 69.4 kg (153 lb)   LMP 06/03/2021   BMI 26.26 kg/m   Estimated body mass index is 26.26 kg/m  as calculated from the following:    Height as of this encounter: 1.626 m (5' 4\").    Weight as of this encounter: 69.4 kg (153 lb). .      "

## 2021-10-26 ENCOUNTER — HOSPITAL ENCOUNTER (OUTPATIENT)
Dept: ULTRASOUND IMAGING | Facility: CLINIC | Age: 26
Discharge: HOME | End: 2021-10-26
Attending: OBSTETRICS & GYNECOLOGY | Admitting: OBSTETRICS & GYNECOLOGY
Payer: COMMERCIAL

## 2021-10-26 DIAGNOSIS — Z34.00 ENCOUNTER FOR SUPERVISION PREGNANCY IN PRIMIGRAVIDA, ANTEPARTUM: ICD-10-CM

## 2021-10-26 PROCEDURE — 76805 OB US >/= 14 WKS SNGL FETUS: CPT

## 2021-11-05 ENCOUNTER — MEDICAL CORRESPONDENCE (OUTPATIENT)
Dept: HEALTH INFORMATION MANAGEMENT | Facility: CLINIC | Age: 26
End: 2021-11-05
Payer: COMMERCIAL

## 2021-11-08 DIAGNOSIS — Z82.62 FAMILY HISTORY OF OSTEOPOROSIS: Primary | ICD-10-CM

## 2021-11-18 ENCOUNTER — PRENATAL OFFICE VISIT (OUTPATIENT)
Dept: OBGYN | Facility: CLINIC | Age: 26
End: 2021-11-18
Payer: COMMERCIAL

## 2021-11-18 VITALS
TEMPERATURE: 96.7 F | BODY MASS INDEX: 27.12 KG/M2 | HEART RATE: 75 BPM | WEIGHT: 158 LBS | DIASTOLIC BLOOD PRESSURE: 72 MMHG | SYSTOLIC BLOOD PRESSURE: 117 MMHG

## 2021-11-18 DIAGNOSIS — Z34.02 ENCOUNTER FOR PRENATAL CARE IN SECOND TRIMESTER OF FIRST PREGNANCY: ICD-10-CM

## 2021-11-18 DIAGNOSIS — Z82.62 FAMILY HISTORY OF OSTEOPOROSIS: ICD-10-CM

## 2021-11-18 LAB
ERYTHROCYTE [DISTWIDTH] IN BLOOD BY AUTOMATED COUNT: 12.1 % (ref 10–15)
GLUCOSE 1H P 50 G GLC PO SERPL-MCNC: 149 MG/DL (ref 70–129)
HCT VFR BLD AUTO: 31.1 % (ref 35–47)
HGB BLD-MCNC: 10.5 G/DL (ref 11.7–15.7)
MCH RBC QN AUTO: 31.3 PG (ref 26.5–33)
MCHC RBC AUTO-ENTMCNC: 33.8 G/DL (ref 31.5–36.5)
MCV RBC AUTO: 93 FL (ref 78–100)
PLATELET # BLD AUTO: 170 10E3/UL (ref 150–450)
RBC # BLD AUTO: 3.36 10E6/UL (ref 3.8–5.2)
WBC # BLD AUTO: 7.8 10E3/UL (ref 4–11)

## 2021-11-18 PROCEDURE — 99207 PR PRENATAL VISIT: CPT | Performed by: OBSTETRICS & GYNECOLOGY

## 2021-11-18 PROCEDURE — 36415 COLL VENOUS BLD VENIPUNCTURE: CPT | Performed by: OBSTETRICS & GYNECOLOGY

## 2021-11-18 PROCEDURE — 86780 TREPONEMA PALLIDUM: CPT | Performed by: OBSTETRICS & GYNECOLOGY

## 2021-11-18 PROCEDURE — 82950 GLUCOSE TEST: CPT | Performed by: OBSTETRICS & GYNECOLOGY

## 2021-11-18 PROCEDURE — 85027 COMPLETE CBC AUTOMATED: CPT | Performed by: OBSTETRICS & GYNECOLOGY

## 2021-11-18 PROCEDURE — 82306 VITAMIN D 25 HYDROXY: CPT | Performed by: OBSTETRICS & GYNECOLOGY

## 2021-11-18 NOTE — NURSING NOTE
"Initial /72 (BP Location: Left arm, Patient Position: Chair, Cuff Size: Adult Regular)   Pulse 75   Temp (!) 96.7  F (35.9  C) (Tympanic)   Wt 71.7 kg (158 lb)   LMP 06/03/2021   Breastfeeding No   BMI 27.12 kg/m   Estimated body mass index is 27.12 kg/m  as calculated from the following:    Height as of 10/14/21: 1.626 m (5' 4\").    Weight as of this encounter: 71.7 kg (158 lb). .    Lorin Gardiner MA    "

## 2021-11-18 NOTE — PROGRESS NOTES
CC: Here for routine prenatal visit @ 24w0d   HPI:  No VB, LOF or ctx. No FM as of yet.      PE: /72 (BP Location: Left arm, Patient Position: Chair, Cuff Size: Adult Regular)   Pulse 75   Temp (!) 96.7  F (35.9  C) (Tympanic)   Wt 71.7 kg (158 lb)   LMP 2021   Breastfeeding No   BMI 27.12 kg/m    See OB flowsheet      A/P: 25 yo  at 24w0d  Breast pump script given.   Routine prenatal care  3rd tri labs.   RTC 4 weeks.    Holli Gillis MD  OB/GYN

## 2021-11-19 LAB
DEPRECATED CALCIDIOL+CALCIFEROL SERPL-MC: 88 UG/L (ref 20–75)
T PALLIDUM AB SER QL: NONREACTIVE

## 2021-11-22 ENCOUNTER — TELEPHONE (OUTPATIENT)
Dept: LAB | Facility: CLINIC | Age: 26
End: 2021-11-22
Payer: COMMERCIAL

## 2021-11-22 DIAGNOSIS — Z34.02 ENCOUNTER FOR PRENATAL CARE IN SECOND TRIMESTER OF FIRST PREGNANCY: Primary | ICD-10-CM

## 2021-11-26 ENCOUNTER — LAB (OUTPATIENT)
Dept: LAB | Facility: CLINIC | Age: 26
End: 2021-11-26
Payer: COMMERCIAL

## 2021-11-26 DIAGNOSIS — Z34.02 ENCOUNTER FOR PRENATAL CARE IN SECOND TRIMESTER OF FIRST PREGNANCY: ICD-10-CM

## 2021-11-26 LAB
GESTATIONAL GTT 1 HR POST DOSE: 150 MG/DL (ref 60–179)
GESTATIONAL GTT 2 HR POST DOSE: 171 MG/DL (ref 60–154)
GESTATIONAL GTT 3 HR POST DOSE: 134 MG/DL (ref 60–139)
GLUCOSE P FAST SERPL-MCNC: 65 MG/DL (ref 60–94)

## 2021-11-26 PROCEDURE — 36415 COLL VENOUS BLD VENIPUNCTURE: CPT

## 2021-11-26 PROCEDURE — 82951 GLUCOSE TOLERANCE TEST (GTT): CPT

## 2021-11-26 PROCEDURE — 82952 GTT-ADDED SAMPLES: CPT

## 2021-12-16 ENCOUNTER — PRENATAL OFFICE VISIT (OUTPATIENT)
Dept: OBGYN | Facility: CLINIC | Age: 26
End: 2021-12-16
Payer: COMMERCIAL

## 2021-12-16 VITALS
SYSTOLIC BLOOD PRESSURE: 108 MMHG | HEART RATE: 83 BPM | BODY MASS INDEX: 27.83 KG/M2 | DIASTOLIC BLOOD PRESSURE: 67 MMHG | RESPIRATION RATE: 16 BRPM | HEIGHT: 64 IN | WEIGHT: 163 LBS | TEMPERATURE: 97.3 F

## 2021-12-16 DIAGNOSIS — Z34.03 ENCOUNTER FOR SUPERVISION OF NORMAL FIRST PREGNANCY IN THIRD TRIMESTER: ICD-10-CM

## 2021-12-16 DIAGNOSIS — Z34.03 ENCOUNTER FOR PRENATAL CARE IN THIRD TRIMESTER OF FIRST PREGNANCY: Primary | ICD-10-CM

## 2021-12-16 PROCEDURE — 99207 PR PRENATAL VISIT: CPT | Performed by: OBSTETRICS & GYNECOLOGY

## 2021-12-16 PROCEDURE — 90471 IMMUNIZATION ADMIN: CPT | Performed by: OBSTETRICS & GYNECOLOGY

## 2021-12-16 PROCEDURE — 90715 TDAP VACCINE 7 YRS/> IM: CPT | Performed by: OBSTETRICS & GYNECOLOGY

## 2021-12-16 ASSESSMENT — MIFFLIN-ST. JEOR: SCORE: 1474.36

## 2021-12-16 NOTE — PROGRESS NOTES
Concerns:   Doing well.  No concerns today.  No vaginal bleeding, loss of fluid, or contractions  Tdap given today  Discussed kick counts and fetal movement.  Discussed PTL, PROM, and when to call or come in.  RTC in 2 weeks.      Baljit Soto MD

## 2021-12-16 NOTE — PROGRESS NOTES
Prior to immunization administration, verified patients identity using patient s name and date of birth. Please see Immunization Activity for additional information.     Screening Questionnaire for Adult Immunization    Are you sick today?   No   Do you have allergies to medications, food, a vaccine component or latex?   No   Have you ever had a serious reaction after receiving a vaccination?   No   Do you have a long-term health problem with heart, lung, kidney, or metabolic disease (e.g., diabetes), asthma, a blood disorder, no spleen, complement component deficiency, a cochlear implant, or a spinal fluid leak?  Are you on long-term aspirin therapy?   No   Do you have cancer, leukemia, HIV/AIDS, or any other immune system problem?   No   Do you have a parent, brother, or sister with an immune system problem?   No   In the past 3 months, have you taken medications that affect  your immune system, such as prednisone, other steroids, or anticancer drugs; drugs for the treatment of rheumatoid arthritis, Crohn s disease, or psoriasis; or have you had radiation treatments?   No   Have you had a seizure, or a brain or other nervous system problem?   No   During the past year, have you received a transfusion of blood or blood    products, or been given immune (gamma) globulin or antiviral drug?   No   For women: Are you pregnant or is there a chance you could become       pregnant during the next month?   Yes   Have you received any vaccinations in the past 4 weeks?   No     I  Per orders of Dr. Soto, injection of tdap given by Mary Jo Hannah CMA. Patient instructed to remain in clinic for 15 minutes afterwards, and to report any adverse reaction to me immediately.       Screening performed by Mary Jo Hannah CMA on 12/16/2021 at 4:03 PM.

## 2021-12-29 ENCOUNTER — PRENATAL OFFICE VISIT (OUTPATIENT)
Dept: OBGYN | Facility: CLINIC | Age: 26
End: 2021-12-29
Payer: COMMERCIAL

## 2021-12-29 VITALS
BODY MASS INDEX: 27.83 KG/M2 | DIASTOLIC BLOOD PRESSURE: 65 MMHG | TEMPERATURE: 98.7 F | RESPIRATION RATE: 18 BRPM | HEIGHT: 64 IN | WEIGHT: 163 LBS | HEART RATE: 77 BPM | SYSTOLIC BLOOD PRESSURE: 114 MMHG

## 2021-12-29 DIAGNOSIS — Z34.03 ENCOUNTER FOR PRENATAL CARE IN THIRD TRIMESTER OF FIRST PREGNANCY: Primary | ICD-10-CM

## 2021-12-29 PROCEDURE — 99207 PR PRENATAL VISIT: CPT | Performed by: OBSTETRICS & GYNECOLOGY

## 2021-12-29 ASSESSMENT — MIFFLIN-ST. JEOR: SCORE: 1474.36

## 2021-12-29 NOTE — NURSING NOTE
"Initial /65 (BP Location: Right arm, Patient Position: Chair, Cuff Size: Adult Regular)   Pulse 77   Temp 98.7  F (37.1  C) (Tympanic)   Resp 18   Ht 1.626 m (5' 4\")   Wt 73.9 kg (163 lb)   LMP 06/03/2021   BMI 27.98 kg/m   Estimated body mass index is 27.98 kg/m  as calculated from the following:    Height as of this encounter: 1.626 m (5' 4\").    Weight as of this encounter: 73.9 kg (163 lb). .      "

## 2021-12-29 NOTE — PROGRESS NOTES
"CC: Here for routine prenatal visit @ 29w6d   HPI:  Feeling well; reports possible ingrown toenail    PE: /65 (BP Location: Right arm, Patient Position: Chair, Cuff Size: Adult Regular)   Pulse 77   Temp 98.7  F (37.1  C) (Tympanic)   Resp 18   Ht 1.626 m (5' 4\")   Wt 73.9 kg (163 lb)   LMP 06/03/2021   BMI 27.98 kg/m     See OB flowsheet      A:  1. Encounter for prenatal care in third trimester of first pregnancy      Advised epsom salt soak for toe  Routine prenatal care  RTC 2 weeks.      Mini Rivera M.D.     "

## 2022-01-02 ENCOUNTER — HOSPITAL ENCOUNTER (EMERGENCY)
Facility: CLINIC | Age: 27
Discharge: HOME | End: 2022-01-02
Attending: STUDENT IN AN ORGANIZED HEALTH CARE EDUCATION/TRAINING PROGRAM | Admitting: STUDENT IN AN ORGANIZED HEALTH CARE EDUCATION/TRAINING PROGRAM
Payer: COMMERCIAL

## 2022-01-02 VITALS
BODY MASS INDEX: 27.83 KG/M2 | RESPIRATION RATE: 11 BRPM | OXYGEN SATURATION: 100 % | DIASTOLIC BLOOD PRESSURE: 72 MMHG | TEMPERATURE: 97.4 F | HEART RATE: 80 BPM | HEIGHT: 64 IN | WEIGHT: 163 LBS | SYSTOLIC BLOOD PRESSURE: 106 MMHG

## 2022-01-02 DIAGNOSIS — R55 SYNCOPE, UNSPECIFIED SYNCOPE TYPE: ICD-10-CM

## 2022-01-02 PROBLEM — G43.909 MIGRAINES: Status: ACTIVE | Noted: 2022-01-02

## 2022-01-02 LAB
ALBUMIN SERPL-MCNC: 2.6 G/DL (ref 3.4–5)
ALP SERPL-CCNC: 64 U/L (ref 40–150)
ALT SERPL W P-5'-P-CCNC: 28 U/L (ref 0–50)
ANION GAP SERPL CALCULATED.3IONS-SCNC: 6 MMOL/L (ref 3–14)
AST SERPL W P-5'-P-CCNC: 27 U/L (ref 0–45)
BASOPHILS # BLD AUTO: 0 10E3/UL (ref 0–0.2)
BASOPHILS NFR BLD AUTO: 0 %
BILIRUB SERPL-MCNC: 0.3 MG/DL (ref 0.2–1.3)
BUN SERPL-MCNC: 5 MG/DL (ref 7–30)
CALCIUM SERPL-MCNC: 8.4 MG/DL (ref 8.5–10.1)
CHLORIDE BLD-SCNC: 105 MMOL/L (ref 94–109)
CO2 SERPL-SCNC: 26 MMOL/L (ref 20–32)
CREAT SERPL-MCNC: 0.71 MG/DL (ref 0.52–1.04)
EOSINOPHIL # BLD AUTO: 0.1 10E3/UL (ref 0–0.7)
EOSINOPHIL NFR BLD AUTO: 1 %
ERYTHROCYTE [DISTWIDTH] IN BLOOD BY AUTOMATED COUNT: 11.9 % (ref 10–15)
GFR SERPL CREATININE-BSD FRML MDRD: >90 ML/MIN/1.73M2
GLUCOSE BLD-MCNC: 73 MG/DL (ref 70–99)
HCT VFR BLD AUTO: 34 % (ref 35–47)
HGB BLD-MCNC: 11.2 G/DL (ref 11.7–15.7)
HOLD SPECIMEN: NORMAL
HOLD SPECIMEN: NORMAL
IMM GRANULOCYTES # BLD: 0.3 10E3/UL
IMM GRANULOCYTES NFR BLD: 3 %
LYMPHOCYTES # BLD AUTO: 1.4 10E3/UL (ref 0.8–5.3)
LYMPHOCYTES NFR BLD AUTO: 15 %
MCH RBC QN AUTO: 30 PG (ref 26.5–33)
MCHC RBC AUTO-ENTMCNC: 32.9 G/DL (ref 31.5–36.5)
MCV RBC AUTO: 91 FL (ref 78–100)
MONOCYTES # BLD AUTO: 0.5 10E3/UL (ref 0–1.3)
MONOCYTES NFR BLD AUTO: 5 %
NEUTROPHILS # BLD AUTO: 6.8 10E3/UL (ref 1.6–8.3)
NEUTROPHILS NFR BLD AUTO: 76 %
NRBC # BLD AUTO: 0 10E3/UL
NRBC BLD AUTO-RTO: 0 /100
PLATELET # BLD AUTO: 144 10E3/UL (ref 150–450)
POTASSIUM BLD-SCNC: 3.9 MMOL/L (ref 3.4–5.3)
PROT SERPL-MCNC: 6.5 G/DL (ref 6.8–8.8)
RBC # BLD AUTO: 3.73 10E6/UL (ref 3.8–5.2)
SODIUM SERPL-SCNC: 137 MMOL/L (ref 133–144)
WBC # BLD AUTO: 9 10E3/UL (ref 4–11)

## 2022-01-02 PROCEDURE — 36415 COLL VENOUS BLD VENIPUNCTURE: CPT | Performed by: STUDENT IN AN ORGANIZED HEALTH CARE EDUCATION/TRAINING PROGRAM

## 2022-01-02 PROCEDURE — 258N000003 HC RX IP 258 OP 636: Performed by: STUDENT IN AN ORGANIZED HEALTH CARE EDUCATION/TRAINING PROGRAM

## 2022-01-02 PROCEDURE — 99284 EMERGENCY DEPT VISIT MOD MDM: CPT | Mod: 25 | Performed by: STUDENT IN AN ORGANIZED HEALTH CARE EDUCATION/TRAINING PROGRAM

## 2022-01-02 PROCEDURE — 93005 ELECTROCARDIOGRAM TRACING: CPT | Performed by: STUDENT IN AN ORGANIZED HEALTH CARE EDUCATION/TRAINING PROGRAM

## 2022-01-02 PROCEDURE — 85014 HEMATOCRIT: CPT | Performed by: STUDENT IN AN ORGANIZED HEALTH CARE EDUCATION/TRAINING PROGRAM

## 2022-01-02 PROCEDURE — 80053 COMPREHEN METABOLIC PANEL: CPT | Performed by: STUDENT IN AN ORGANIZED HEALTH CARE EDUCATION/TRAINING PROGRAM

## 2022-01-02 PROCEDURE — 96360 HYDRATION IV INFUSION INIT: CPT | Performed by: STUDENT IN AN ORGANIZED HEALTH CARE EDUCATION/TRAINING PROGRAM

## 2022-01-02 PROCEDURE — 93010 ELECTROCARDIOGRAM REPORT: CPT | Performed by: STUDENT IN AN ORGANIZED HEALTH CARE EDUCATION/TRAINING PROGRAM

## 2022-01-02 RX ADMIN — SODIUM CHLORIDE, POTASSIUM CHLORIDE, SODIUM LACTATE AND CALCIUM CHLORIDE 1000 ML: 600; 310; 30; 20 INJECTION, SOLUTION INTRAVENOUS at 11:57

## 2022-01-02 ASSESSMENT — MIFFLIN-ST. JEOR: SCORE: 1474.36

## 2022-01-02 NOTE — ED PROVIDER NOTES
History     Chief Complaint   Patient presents with     Syncope     , 30 wks pg. pt fainted while riding in the car today.  she states she felt faint this a.m.  she was otherwise feeling well and has no c/o     HPI  Jemima Graf is a 26 year old female  pregnant female of estimated 30 weeks gestation who presents to the department with partner for evaluation after report of witnessed episode of loss of consciousness.  Patient explains that she was sitting in the backseat of her father's car when she began feeling warm, flushed, and lightheaded.  She alerted family and her partner in the front passenger seat and mother in the backseat with her witnessed an episode of loss of consciousness lasting nearly 30 seconds before gradually resolving.  Patient has a history of syncopal episodes but has not fainted in greater than 1 year since discontinuing propranolol previously prescribed for migraine headaches.  No known history of seizure disorder or witnessed seizure activity.  She denies headache, blurred vision, neck pain, chest pain, palpitations, cough, shortness of breath, abdominal pain, vomiting, diarrhea, bleeding, leg pain or swelling.  No active complaints in the department.    Allergies:  Allergies   Allergen Reactions     Lisdexamfetamine Other (See Comments)       Problem List:    Patient Active Problem List    Diagnosis Date Noted     Migraines 2022     Priority: Medium     Formatting of this note might be different from the original.  IMO Update 10/11       Prenatal care, first pregnancy 2021     Priority: Medium     Adopted 2021     Priority: Medium     Migraine without aura and without status migrainosus, not intractable 2019     Priority: Medium     IMO Update 10/11       ADHD (attention deficit hyperactivity disorder), inattentive type 05/15/2018     Priority: Medium     Generalized anxiety disorder 2016     Priority: Medium     Overweight 2016      "Priority: Medium        Past Medical History:    Past Medical History:   Diagnosis Date     ADHD (attention deficit hyperactivity disorder), inattentive type      History of migraine headaches      IUD (intrauterine device) in place        Past Surgical History:    Past Surgical History:   Procedure Laterality Date     FOOT SURGERY Right        Family History:    Family History   Adopted: Yes   Problem Relation Age of Onset     Substance Abuse Father         A&D     Depression Father      Congenital Anomalies Mother         born partial deaf       Social History:  Marital Status:   [2]  Social History     Tobacco Use     Smoking status: Never Smoker     Smokeless tobacco: Never Used   Vaping Use     Vaping Use: Never used   Substance Use Topics     Alcohol use: Never     Drug use: Never        Medications:    acetaminophen (TYLENOL) 325 MG tablet  calcium carbonate 750 MG CHEW  coenzyme Q-10 200 MG CAPS  doxylamine (UNISOM) 25 MG TABS tablet  magnesium oxide (MAG-OX) 400 (241.3 Mg) MG tablet  metoclopramide (REGLAN) 10 MG tablet  Prenatal Vit-Fe Fumarate-FA (PRENATAL MULTIVITAMIN W/IRON) 27-0.8 MG tablet  pyridOXINE (VITAMIN B6) 25 MG tablet  Vitamin D3 (CHOLECALCIFEROL) 125 MCG (5000 UT) tablet          Review of Systems  Constitutional:  Negative for fever or recent illness.  Cardiovascular:  Negative for palpitations or chest discomfort.  Respiratory:  Negative for cough or shortness of breath.   Gastrointestinal:  Negative for abdominal pain, diarrhea, nausea or vomiting.   Genitourinary:  Negative for pelvic pain or vaginal bleeding.  Musculoskeletal: Negative for back or flank pain.  Neurological: Positive for witnessed episode of loss of consciousness.  Negative for headache.    All others reviewed and are negative.      Physical Exam   BP: 105/68  Pulse: 90  Temp: 97.4  F (36.3  C)  Resp: 18  Height: 162.6 cm (5' 4\")  Weight: 73.9 kg (163 lb)  SpO2: 100 %  Lying Orthostatic BP: 98/69  Lying " Orthostatic Pulse: 75 bpm  Standing Orthostatic BP: 106/72  Standing Orthostatic Pulse: 75 bpm      Physical Exam  Constitutional:  Well developed, well nourished.  Appears nontoxic and in no acute distress.    HENT:  Normocephalic and atraumatic.  Symmetric in appearance.  Eyes:  Conjunctivae are normal.  Neck:  Neck supple.  Cardiovascular:  No cyanosis.  RRR.  No audible murmurs noted.  No lower extremity edema or asymmetry.   Respiratory:  Effort normal without sign of respiratory distress.  No audible wheezing or stridor.  CTAB.   Gastrointestinal:  Gravid abdomen.  Nontender and without guarding.  No rigidity or rebound tenderness.  Negative Brunson's sign.  Negative McBurney's point.   Musculoskeletal:  Moves extremities spontaneously.  Neurological:  Patient is alert and appropriately conversing.  Skin:  Skin is warm and dry.  Psychiatric:  Normal mood and affect.      ED Course                 Procedures                EKG Interpretation:      Interpreted by: Blayne Gomez  Time reviewed: Upon completion    Symptoms at time of EKG: Asymptomatic   Rhythm: Sinus  Rate: 62 bpm  Axis: Normal    Conduction: None atypical   ST Segments/ T Waves: No pathologic ST-elevations or T-wave abnormalities.  Q Waves: None    Clinical Impression: No sign of ischemia or arrhythmia        Critical Care time:  none               Results for orders placed or performed during the hospital encounter of 01/02/22 (from the past 24 hour(s))   CBC with platelets differential    Narrative    The following orders were created for panel order CBC with platelets differential.  Procedure                               Abnormality         Status                     ---------                               -----------         ------                     CBC with platelets and d...[[id number]]  Abnormal            Final result                 Please view results for these tests on the individual orders.   Comprehensive metabolic panel   Result  Value Ref Range    Sodium 137 133 - 144 mmol/L    Potassium 3.9 3.4 - 5.3 mmol/L    Chloride 105 94 - 109 mmol/L    Carbon Dioxide (CO2) 26 20 - 32 mmol/L    Anion Gap 6 3 - 14 mmol/L    Urea Nitrogen 5 (L) 7 - 30 mg/dL    Creatinine 0.71 0.52 - 1.04 mg/dL    Calcium 8.4 (L) 8.5 - 10.1 mg/dL    Glucose 73 70 - 99 mg/dL    Alkaline Phosphatase 64 40 - 150 U/L    AST 27 0 - 45 U/L    ALT 28 0 - 50 U/L    Protein Total 6.5 (L) 6.8 - 8.8 g/dL    Albumin 2.6 (L) 3.4 - 5.0 g/dL    Bilirubin Total 0.3 0.2 - 1.3 mg/dL    GFR Estimate >90 >60 mL/min/1.73m2   CBC with platelets and differential   Result Value Ref Range    WBC Count 9.0 4.0 - 11.0 10e3/uL    RBC Count 3.73 (L) 3.80 - 5.20 10e6/uL    Hemoglobin 11.2 (L) 11.7 - 15.7 g/dL    Hematocrit 34.0 (L) 35.0 - 47.0 %    MCV 91 78 - 100 fL    MCH 30.0 26.5 - 33.0 pg    MCHC 32.9 31.5 - 36.5 g/dL    RDW 11.9 10.0 - 15.0 %    Platelet Count 144 (L) 150 - 450 10e3/uL    % Neutrophils 76 %    % Lymphocytes 15 %    % Monocytes 5 %    % Eosinophils 1 %    % Basophils 0 %    % Immature Granulocytes 3 %    NRBCs per 100 WBC 0 <1 /100    Absolute Neutrophils 6.8 1.6 - 8.3 10e3/uL    Absolute Lymphocytes 1.4 0.8 - 5.3 10e3/uL    Absolute Monocytes 0.5 0.0 - 1.3 10e3/uL    Absolute Eosinophils 0.1 0.0 - 0.7 10e3/uL    Absolute Basophils 0.0 0.0 - 0.2 10e3/uL    Absolute Immature Granulocytes 0.3 <=0.4 10e3/uL    Absolute NRBCs 0.0 10e3/uL   [place] Draw    Narrative    The following orders were created for panel order [place] Draw.  Procedure                               Abnormality         Status                     ---------                               -----------         ------                     Extra Blue Top Tube[[id number]]                              In process                 Extra Red Top Tube[[id number]]                               In process                   Please view results for these tests on the individual orders.       Medications   lactated ringers BOLUS  1,000 mL (1,000 mLs Intravenous New Bag 1/2/22 [id number])       Assessments & Plan (with Medical Decision Making)   Jemima Graf is a 26 year old female who presents to the department for evaluation after witnessed syncopal episode while seated in the backseat of a car.  Patient is an estimated 30 weeks pregnant with history of syncopal episodes, but previously was orthostatic in nature and she correlated with propranolol.  Patient is not overly anemic and without sign of hemorrhagic process or pulmonary embolism.  EKG is without evidence of WPW, Brugada, long-QT syndrome, hypertrophic cardiomyopathy, or significant heart block.  No sign of arrhythmia on cardiac monitor.  She was monitored for significant amount of time in the department without developing symptoms and even ambulatory while feeling well back to baseline.  It is possible that positioning in car with regards to IVC could have contributed or otherwise vasovagal episode, given stability during monitored stay in the department seems reasonable to discharge home with instructions to monitor closely for developing symptoms or concerns necessitating immediate return to the department.        Disclaimer:  This note consists of symbols derived from keyboarding, dictation, and/or voice recognition software.  As a result, there may be errors in the script that have gone undetected.  Please consider this when interpreting information found in the chart.        I have reviewed the nursing notes.    I have reviewed the findings, diagnosis, plan and need for follow up with the patient.       New Prescriptions    No medications on file       Final diagnoses:   Syncope, unspecified syncope type       1/2/2022   [facility] EMERGENCY DEPT     Blayne Gomez DO  01/02/22 3128

## 2022-01-02 NOTE — ED NOTES
Pt was on her way to her babyshower and started to feel hot. Stated she has fainted before and knew that she was going to faint and that was all she remembered.  told her she was out for a few minutes. Denied any chest pain, SOB, vomiting, diarrhea, loss of B/B. Reports a little nausea, which she has had throughout this pregnancy. She was unsure of how much she ate this morning. Baby's HR was 135.

## 2022-01-03 ENCOUNTER — PRENATAL OFFICE VISIT (OUTPATIENT)
Dept: OBGYN | Facility: CLINIC | Age: 27
End: 2022-01-03
Payer: COMMERCIAL

## 2022-01-03 VITALS
HEIGHT: 64 IN | RESPIRATION RATE: 18 BRPM | TEMPERATURE: 98 F | HEART RATE: 73 BPM | BODY MASS INDEX: 27.69 KG/M2 | SYSTOLIC BLOOD PRESSURE: 107 MMHG | DIASTOLIC BLOOD PRESSURE: 67 MMHG | WEIGHT: 162.2 LBS

## 2022-01-03 DIAGNOSIS — Z34.03 ENCOUNTER FOR PRENATAL CARE IN THIRD TRIMESTER OF FIRST PREGNANCY: Primary | ICD-10-CM

## 2022-01-03 PROCEDURE — 99207 PR PRENATAL VISIT: CPT | Performed by: OBSTETRICS & GYNECOLOGY

## 2022-01-03 ASSESSMENT — MIFFLIN-ST. JEOR: SCORE: 1470.73

## 2022-01-03 NOTE — PROGRESS NOTES
"[facility]    Return OB Note    CC: Return OB     Subjective:  Jemima is a 26 year old  at 30w4d   Denies vaginal bleeding, loss of fluid, or regular contractions. Good fetal movement.  Complaints today: Had a syncopal episode yesterday while riding in the car. Preceded by hot flush and shortness of breath. Was seen in ED and evaluation was normal. No further episodes since.     Objective:  /67 (BP Location: Right arm, Patient Position: Chair, Cuff Size: Adult Regular)   Pulse 73   Temp 98  F (36.7  C) (Tympanic)   Resp 18   Ht 1.626 m (5' 4\")   Wt 73.6 kg (162 lb 3.2 oz)   LMP 2021   Breastfeeding No   BMI 27.84 kg/m      Fundal height: 31cm  FHT: 140bpm    Assessment/Plan:   Encounter Diagnosis   Name Primary?     Encounter for prenatal care in third trimester of first pregnancy Yes       IUP at 30w4d  -Syncopal episode yesterday: ED evaluation negative including normal EKG, orthostatic vitals, electrolytes, and CBC essentially normal for pregnancy (mild gestational thrombocytopenia). No symptoms since, good fetal movement. Will continue to monitor symptoms for now. Consider cardiology referral if recurrent.   -Mid trimester labs: failed 1 hr GCT, passed 3 hr GTT  -Strict return precautions given    RTC 1-2 weeks    Breanna Cueva DO    "

## 2022-01-12 ENCOUNTER — PRENATAL OFFICE VISIT (OUTPATIENT)
Dept: OBGYN | Facility: CLINIC | Age: 27
End: 2022-01-12
Payer: COMMERCIAL

## 2022-01-12 VITALS
DIASTOLIC BLOOD PRESSURE: 61 MMHG | BODY MASS INDEX: 27.95 KG/M2 | RESPIRATION RATE: 16 BRPM | SYSTOLIC BLOOD PRESSURE: 116 MMHG | HEART RATE: 91 BPM | WEIGHT: 163.7 LBS | TEMPERATURE: 98.5 F | HEIGHT: 64 IN

## 2022-01-12 DIAGNOSIS — Z34.03 ENCOUNTER FOR SUPERVISION OF NORMAL FIRST PREGNANCY IN THIRD TRIMESTER: Primary | ICD-10-CM

## 2022-01-12 PROCEDURE — 99207 PR PRENATAL VISIT: CPT | Performed by: OBSTETRICS & GYNECOLOGY

## 2022-01-12 ASSESSMENT — MIFFLIN-ST. JEOR: SCORE: 1477.54

## 2022-01-12 NOTE — NURSING NOTE
"Initial /61 (BP Location: Right arm, Patient Position: Chair, Cuff Size: Adult Regular)   Pulse 91   Temp 98.5  F (36.9  C) (Tympanic)   Resp 16   Ht 1.626 m (5' 4\")   Wt 74.3 kg (163 lb 11.2 oz)   LMP 06/03/2021   Breastfeeding No   BMI 28.10 kg/m   Estimated body mass index is 28.1 kg/m  as calculated from the following:    Height as of this encounter: 1.626 m (5' 4\").    Weight as of this encounter: 74.3 kg (163 lb 11.2 oz). .    Flora Hinojosa LPN  "

## 2022-01-12 NOTE — PROGRESS NOTES
Concerns:   Doing well.  No concerns today.  No vaginal bleeding, LOF.  No contractions.  Reportable signs and symptoms discussed.  Discussed kick counts and fetal movement.  Discussed PTL, PROM, and when to call or come in.  RTC 2 weeks.    Baljit Soto MD

## 2022-01-14 ENCOUNTER — HOSPITAL ENCOUNTER (OUTPATIENT)
Facility: CLINIC | Age: 27
Discharge: HOME | End: 2022-01-14
Attending: EMERGENCY MEDICINE | Admitting: OBSTETRICS & GYNECOLOGY
Payer: COMMERCIAL

## 2022-01-14 VITALS
TEMPERATURE: 98.6 F | DIASTOLIC BLOOD PRESSURE: 48 MMHG | RESPIRATION RATE: 20 BRPM | OXYGEN SATURATION: 97 % | SYSTOLIC BLOOD PRESSURE: 90 MMHG | HEART RATE: 101 BPM

## 2022-01-14 DIAGNOSIS — U07.1 COVID-19 AFFECTING PREGNANCY IN THIRD TRIMESTER: Primary | ICD-10-CM

## 2022-01-14 DIAGNOSIS — O98.513 COVID-19 AFFECTING PREGNANCY IN THIRD TRIMESTER: Primary | ICD-10-CM

## 2022-01-14 PROBLEM — G43.009 MIGRAINE WITHOUT AURA AND WITHOUT STATUS MIGRAINOSUS, NOT INTRACTABLE: Status: ACTIVE | Noted: 2019-12-03

## 2022-01-14 PROBLEM — Z36.89 ENCOUNTER FOR TRIAGE IN PREGNANT PATIENT: Status: ACTIVE | Noted: 2022-01-14

## 2022-01-14 PROBLEM — Z34.00 PRENATAL CARE, FIRST PREGNANCY: Status: ACTIVE | Noted: 2021-07-14

## 2022-01-14 PROCEDURE — 59025 FETAL NON-STRESS TEST: CPT

## 2022-01-14 PROCEDURE — G0463 HOSPITAL OUTPT CLINIC VISIT: HCPCS

## 2022-01-14 RX ORDER — LIDOCAINE 40 MG/G
CREAM TOPICAL
Status: DISCONTINUED | OUTPATIENT
Start: 2022-01-14 | End: 2022-01-14 | Stop reason: HOSPADM

## 2022-01-14 NOTE — ED TRIAGE NOTES
, Tier 1, NST reactive. Mild irritability, patient not aware. /57, O2 sat 98%, RR18, T 99.0 orally.  Strip reviewed by Dr. Nelson. OK to discharge.

## 2022-01-14 NOTE — PROGRESS NOTES
NST IS:  Reactive (2 accl > 15 BPM in 20 min., each lasting approx. 15 seconds)  NST Baseline Rate 150  Variability:  Average  Accelerations:Present  Variable Decelerations:Yes - describe:  Isolated deceleration to 130 lasting 60 seconds with spontaneous return to baseline  Other Decelerations:No  Contractions: none    Further Comments:  Plan prolonged monitoring to ensure continued Tier 1 tracing.     Izzy Nelson M.D.

## 2022-01-14 NOTE — PROGRESS NOTES
Pt discharged to home with a pulse ox monitor.  Discharge instructions for an undelivered pregnant person given and reviewed.  COVID and pregnancy information given.  [facility] get in the loop staywell MIREYA information given.  Going home with oximter instructions given.  Encouraged pt to hydrate, hydrate, hydrate at least 3 liters /day.  Encouraged pt to read all handouts given. She verbalized understanding but had questions about receiving antibodies.  Referred her to message those questions to ADAM to OB clinic dr with those questions.   Gave phone numbers for the OB clinic and nurse triage line.  Copy of AVS given.  Pt able to ambulate to the door by self.

## 2022-01-14 NOTE — ED TRIAGE NOTES
at 32 weeks gestation to ED with Covid symptoms, fatigue,cough, aches and fever. EFM applied, , NST reactive. FHT baseline increased to 170 with minimal variability. Uterine activity shows mild irritability, patient denies feeling contractions or pain. Patient repositioned to her left side, .  Will continue to monitor.

## 2022-01-14 NOTE — DISCHARGE INSTRUCTIONS
Discharge Instruction for Undelivered Patients      You were seen for: Fetal Assessment  We Consulted: DR Nelson  You had (Test or Medicine):NST     Diet:   Drink 8 to 12 glasses of liquids (milk, juice, water) every day.  You may eat meals and snacks.     Activity:  Count fetal kicks everyday (see handout)  Call your doctor or nurse midwife if your baby is moving less than usual.     Call your provider if you notice:  Swelling in your face or increased swelling in your hands or legs.  Headaches that are not relieved by Tylenol (acetaminophen).  Changes in your vision (blurring: seeing spots or stars.)  Nausea (sick to your stomach) and vomiting (throwing up).   Weight gain of 5 pounds or more per week.  Heartburn that doesn't go away.  Signs of bladder infection: pain when you urinate (use the toilet), need to go more often and more urgently.  The bag of bhandari (rupture of membranes) breaks, or you notice leaking in your underwear.  Bright red blood in your underwear.  Abdominal (lower belly) or stomach pain.  For first baby: Contractions (tightening) less than 5 minutes apart for one hour or more.  Second (plus) baby: Contractions (tightening) less than 10 minutes apart and getting stronger.  *If less than 34 weeks: Contractions (tightening) more than 6 times in one hour.  Increase or change in vaginal discharge (note the color and amount)  Other: ***    Follow-up:  As scheduled in the clinic

## 2022-01-26 ENCOUNTER — PRENATAL OFFICE VISIT (OUTPATIENT)
Dept: OBGYN | Facility: CLINIC | Age: 27
End: 2022-01-26
Payer: COMMERCIAL

## 2022-01-26 VITALS
DIASTOLIC BLOOD PRESSURE: 67 MMHG | TEMPERATURE: 98.1 F | HEIGHT: 64 IN | SYSTOLIC BLOOD PRESSURE: 115 MMHG | BODY MASS INDEX: 27.71 KG/M2 | RESPIRATION RATE: 16 BRPM | WEIGHT: 162.3 LBS | HEART RATE: 70 BPM

## 2022-01-26 DIAGNOSIS — Z34.03 ENCOUNTER FOR SUPERVISION OF NORMAL FIRST PREGNANCY IN THIRD TRIMESTER: Primary | ICD-10-CM

## 2022-01-26 PROCEDURE — 99207 PR PRENATAL VISIT: CPT | Performed by: OBSTETRICS & GYNECOLOGY

## 2022-01-26 ASSESSMENT — MIFFLIN-ST. JEOR: SCORE: 1471.19

## 2022-01-26 NOTE — NURSING NOTE
"Initial /67 (BP Location: Right arm, Patient Position: Chair, Cuff Size: Adult Regular)   Pulse 70   Temp 98.1  F (36.7  C) (Tympanic)   Resp 16   Ht 1.626 m (5' 4\")   Wt 73.6 kg (162 lb 4.8 oz)   LMP 06/03/2021   BMI 27.86 kg/m   Estimated body mass index is 27.86 kg/m  as calculated from the following:    Height as of this encounter: 1.626 m (5' 4\").    Weight as of this encounter: 73.6 kg (162 lb 4.8 oz). .    Flora Hinojosa LPN  "

## 2022-01-26 NOTE — PROGRESS NOTES
Concerns: she is scheduled for interval growth scan  as she had Covid @ 32 weeks  Doing well.  No concerns today.  No vaginal bleeding, LOF.  No contractions.  Discussed kick counts and fetal movement.  Reportable signs and symptoms discussed.  Discussed kick counts and fetal movement.  Discussed PTL, PROM, and when to call or come in.  RTC 2 weeks.    Baljit Soto MD

## 2022-02-08 ENCOUNTER — PRENATAL OFFICE VISIT (OUTPATIENT)
Dept: OBGYN | Facility: CLINIC | Age: 27
End: 2022-02-08
Payer: COMMERCIAL

## 2022-02-08 VITALS
TEMPERATURE: 98 F | BODY MASS INDEX: 28.36 KG/M2 | SYSTOLIC BLOOD PRESSURE: 112 MMHG | HEIGHT: 64 IN | WEIGHT: 166.1 LBS | HEART RATE: 90 BPM | RESPIRATION RATE: 16 BRPM | DIASTOLIC BLOOD PRESSURE: 70 MMHG

## 2022-02-08 DIAGNOSIS — Z34.03 ENCOUNTER FOR PRENATAL CARE IN THIRD TRIMESTER OF FIRST PREGNANCY: Primary | ICD-10-CM

## 2022-02-08 DIAGNOSIS — Z34.03 ENCOUNTER FOR SUPERVISION OF NORMAL FIRST PREGNANCY IN THIRD TRIMESTER: ICD-10-CM

## 2022-02-08 PROCEDURE — 87653 STREP B DNA AMP PROBE: CPT | Performed by: OBSTETRICS & GYNECOLOGY

## 2022-02-08 PROCEDURE — 99207 PR PRENATAL VISIT: CPT | Performed by: OBSTETRICS & GYNECOLOGY

## 2022-02-08 ASSESSMENT — MIFFLIN-ST. JEOR: SCORE: 1488.42

## 2022-02-08 NOTE — PROGRESS NOTES
Concerns:   Doing well.  No concerns today.  No vaginal bleeding, LOF.  No contractions.  Reportable signs and symptoms discussed.  Discussed kick counts and fetal movement.  Discussed PTL, PROM, and when to call or come in.  RTC weekly.    Baljit Soto MD

## 2022-02-09 LAB
GP B STREP DNA SPEC QL NAA+PROBE: NEGATIVE
PATIENT PENICILLIN, AMOXICILLIN, CEPHALOSPORINS ALLERGY: NO

## 2022-02-10 ENCOUNTER — HOSPITAL ENCOUNTER (OUTPATIENT)
Dept: ULTRASOUND IMAGING | Facility: CLINIC | Age: 27
Discharge: HOME | End: 2022-02-10
Attending: OBSTETRICS & GYNECOLOGY | Admitting: OBSTETRICS & GYNECOLOGY
Payer: COMMERCIAL

## 2022-02-10 DIAGNOSIS — Z34.03 ENCOUNTER FOR SUPERVISION OF NORMAL FIRST PREGNANCY IN THIRD TRIMESTER: ICD-10-CM

## 2022-02-10 PROCEDURE — 76816 OB US FOLLOW-UP PER FETUS: CPT

## 2022-02-15 ENCOUNTER — PRENATAL OFFICE VISIT (OUTPATIENT)
Dept: OBGYN | Facility: CLINIC | Age: 27
End: 2022-02-15
Payer: COMMERCIAL

## 2022-02-15 VITALS
SYSTOLIC BLOOD PRESSURE: 109 MMHG | HEART RATE: 79 BPM | RESPIRATION RATE: 18 BRPM | DIASTOLIC BLOOD PRESSURE: 69 MMHG | BODY MASS INDEX: 28.51 KG/M2 | TEMPERATURE: 97.3 F | WEIGHT: 167 LBS | HEIGHT: 64 IN

## 2022-02-15 DIAGNOSIS — Z34.03 ENCOUNTER FOR PRENATAL CARE IN THIRD TRIMESTER OF FIRST PREGNANCY: Primary | ICD-10-CM

## 2022-02-15 PROCEDURE — 99207 PR PRENATAL VISIT: CPT | Performed by: OBSTETRICS & GYNECOLOGY

## 2022-02-15 ASSESSMENT — MIFFLIN-ST. JEOR: SCORE: 1492.51

## 2022-02-15 NOTE — PROGRESS NOTES
"[facility]     Return OB Note     CC: Return OB      Subjective:  Jemima is a 26 year old  at 36w5d   Denies vaginal bleeding, loss of fluid, or regular contractions. Good fetal movement.  Upset that her mom won't be able to attend the delivery.     Objective:  /69 (BP Location: Right arm, Patient Position: Chair, Cuff Size: Adult Regular)   Pulse 79   Temp 97.3  F (36.3  C) (Tympanic)   Resp 18   Ht 1.626 m (5' 4\")   Wt 75.8 kg (167 lb)   LMP 2021   BMI 28.67 kg/m       See Ob flowsheet     Assessment/Plan:   IUP at 36w5d  -GBS neg  -Mid trimester labs: failed 1 hr GCT, passed 3 hr GTT  -COVID in pregnancy; growth at 16%ile   -Strict return precautions given     RTC 1 weekly until delivery. Labor and FM precautions reviewed.  Holli Gillis MD  OB/GYN        "

## 2022-02-15 NOTE — NURSING NOTE
"Initial /69 (BP Location: Right arm, Patient Position: Chair, Cuff Size: Adult Regular)   Pulse 79   Temp 97.3  F (36.3  C) (Tympanic)   Resp 18   Ht 1.626 m (5' 4\")   Wt 75.8 kg (167 lb)   LMP 06/03/2021   BMI 28.67 kg/m   Estimated body mass index is 28.67 kg/m  as calculated from the following:    Height as of this encounter: 1.626 m (5' 4\").    Weight as of this encounter: 75.8 kg (167 lb). .      "

## 2022-02-24 ENCOUNTER — PRENATAL OFFICE VISIT (OUTPATIENT)
Dept: OBGYN | Facility: CLINIC | Age: 27
End: 2022-02-24
Payer: COMMERCIAL

## 2022-02-24 VITALS
SYSTOLIC BLOOD PRESSURE: 109 MMHG | TEMPERATURE: 97 F | WEIGHT: 166.4 LBS | DIASTOLIC BLOOD PRESSURE: 67 MMHG | HEART RATE: 64 BPM | BODY MASS INDEX: 28.41 KG/M2 | HEIGHT: 64 IN | RESPIRATION RATE: 16 BRPM

## 2022-02-24 DIAGNOSIS — Z34.03 ENCOUNTER FOR PRENATAL CARE IN THIRD TRIMESTER OF FIRST PREGNANCY: Primary | ICD-10-CM

## 2022-02-24 PROBLEM — Z36.89 ENCOUNTER FOR TRIAGE IN PREGNANT PATIENT: Status: RESOLVED | Noted: 2022-01-14 | Resolved: 2022-02-24

## 2022-02-24 PROCEDURE — 99207 PR PRENATAL VISIT: CPT | Performed by: OBSTETRICS & GYNECOLOGY

## 2022-02-24 NOTE — PROGRESS NOTES
"CC: Here for routine prenatal visit @ 38w0d   HPI: + FM, no ctx, no LOF, no VB.  No complaints.     PE: /67 (BP Location: Right arm, Patient Position: Chair, Cuff Size: Adult Regular)   Pulse 64   Temp 97  F (36.1  C) (Tympanic)   Resp 16   Ht 1.626 m (5' 4\")   Wt 75.5 kg (166 lb 6.4 oz)   LMP 06/03/2021   Breastfeeding No   BMI 28.56 kg/m     See OB flowsheet    GBS negative    A/P G1 @ 38w0d normal pregnancy    1. Routine prenatal care.  COVID restrictions and recommendations reviewed including iron supplementation.  Labor precautions reviewed.  Discussed post-term induction of labor.     RTC 1 week    Izzy Nelson M.D.    "

## 2022-02-24 NOTE — NURSING NOTE
"Initial /67 (BP Location: Right arm, Patient Position: Chair, Cuff Size: Adult Regular)   Pulse 64   Temp 97  F (36.1  C) (Tympanic)   Resp 16   Ht 1.626 m (5' 4\")   Wt 75.5 kg (166 lb 6.4 oz)   LMP 06/03/2021   Breastfeeding No   BMI 28.56 kg/m   Estimated body mass index is 28.56 kg/m  as calculated from the following:    Height as of this encounter: 1.626 m (5' 4\").    Weight as of this encounter: 75.5 kg (166 lb 6.4 oz). .    Nallely Nash, KAIT    "

## 2022-03-02 ENCOUNTER — PRENATAL OFFICE VISIT (OUTPATIENT)
Dept: OBGYN | Facility: CLINIC | Age: 27
End: 2022-03-02
Payer: COMMERCIAL

## 2022-03-02 VITALS
BODY MASS INDEX: 28.71 KG/M2 | HEART RATE: 70 BPM | HEIGHT: 64 IN | SYSTOLIC BLOOD PRESSURE: 117 MMHG | TEMPERATURE: 97.6 F | WEIGHT: 168.2 LBS | DIASTOLIC BLOOD PRESSURE: 68 MMHG | RESPIRATION RATE: 16 BRPM

## 2022-03-02 DIAGNOSIS — Z34.03 ENCOUNTER FOR PRENATAL CARE IN THIRD TRIMESTER OF FIRST PREGNANCY: Primary | ICD-10-CM

## 2022-03-02 PROCEDURE — 99207 PR PRENATAL VISIT: CPT | Performed by: OBSTETRICS & GYNECOLOGY

## 2022-03-02 NOTE — PROGRESS NOTES
"[facility]     Return OB Note     CC: Return OB      Subjective:  Jemima is a 26 year old  at 38w6d   Denies vaginal bleeding, loss of fluid, or regular contractions. Good fetal movement.     Objective:  /68 (BP Location: Right arm, Patient Position: Chair, Cuff Size: Adult Regular)   Pulse 70   Temp 97.6  F (36.4  C) (Tympanic)   Resp 16   Ht 1.626 m (5' 4\")   Wt 76.3 kg (168 lb 3.2 oz)   LMP 2021   BMI 28.87 kg/m       See Ob flowsheet     Assessment/Plan:   IUP at 38w6d  -GBS neg  -Mid trimester labs: failed 1 hr GCT, passed 3 hr GTT  -COVID in pregnancy; growth at 16%ile   -Strict return precautions given  -knows about options, breastfeeding      RTC 1 weekly until delivery. Labor and FM precautions reviewed.  Holli Gillis MD  OB/GYN   "

## 2022-03-02 NOTE — PROGRESS NOTES
"Initial /68 (BP Location: Right arm, Patient Position: Chair, Cuff Size: Adult Regular)   Pulse 70   Temp 97.6  F (36.4  C) (Tympanic)   Resp 16   Ht 1.626 m (5' 4\")   Wt 76.3 kg (168 lb 3.2 oz)   LMP 06/03/2021   BMI 28.87 kg/m   Estimated body mass index is 28.87 kg/m  as calculated from the following:    Height as of this encounter: 1.626 m (5' 4\").    Weight as of this encounter: 76.3 kg (168 lb 3.2 oz). .      "

## 2022-03-03 ENCOUNTER — TELEPHONE (OUTPATIENT)
Dept: OBGYN | Facility: CLINIC | Age: 27
End: 2022-03-03

## 2022-03-03 ENCOUNTER — ALLIED HEALTH/NURSE VISIT (OUTPATIENT)
Dept: OBGYN | Facility: CLINIC | Age: 27
End: 2022-03-03
Payer: COMMERCIAL

## 2022-03-03 DIAGNOSIS — O36.8130 DECREASED FETAL MOVEMENTS IN THIRD TRIMESTER, SINGLE OR UNSPECIFIED FETUS: Primary | ICD-10-CM

## 2022-03-03 PROCEDURE — 59025 FETAL NON-STRESS TEST: CPT

## 2022-03-03 NOTE — TELEPHONE ENCOUNTER
S-(situation): Patient calling clinic to report that she has not had regular fetal movement today. Patient reports she has been at work all day and has only noticed a few sporadic movements. Patient reports she has been drinking and eating regularly today to try to get baby to respond. No other questions or concerns. Patient on her way home from work.    B-(background):  at 39 weeks gestation.  Last office visit yesterday.    A-(assessment):decreased fetal movement    R-(recommendations): Reassurance provided. Asked patient to come to clinic for NST. Patient agreeable to plan. Will be here between [id number].    Provider on call (Dr. Nelson) notified and in agreement with plan.    Carmelina Guillen   Ob/Gyn Clinic  RN

## 2022-03-03 NOTE — PROGRESS NOTES
Patient seen today for decreased fetal movement.      NST IS:  Reactive (2 accl > 15 BPM in 20 min., each lasting approx. 15 seconds)  NST Baseline Rate 130  Variability:  Average  Accelerations:Present  Variable Decelerations:No  Other Decelerations:No  Contractions: none      Izzy Nelson M.D.

## 2022-03-07 ENCOUNTER — VIRTUAL VISIT (OUTPATIENT)
Dept: NEUROLOGY | Facility: CLINIC | Age: 27
End: 2022-03-07
Payer: COMMERCIAL

## 2022-03-07 DIAGNOSIS — G43.009 MIGRAINE WITHOUT AURA AND WITHOUT STATUS MIGRAINOSUS, NOT INTRACTABLE: Primary | ICD-10-CM

## 2022-03-07 PROCEDURE — 99214 OFFICE O/P EST MOD 30 MIN: CPT | Mod: 95 | Performed by: PSYCHIATRY & NEUROLOGY

## 2022-03-07 RX ORDER — SUMATRIPTAN 100 MG/1
100 TABLET, FILM COATED ORAL
Qty: 18 TABLET | Refills: 3 | Status: SHIPPED | OUTPATIENT
Start: 2022-03-07 | End: 2022-08-22

## 2022-03-07 ASSESSMENT — HEADACHE IMPACT TEST (HIT 6)
HOW OFTEN DO HEADACHES LIMIT YOUR DAILY ACTIVITIES: SOMETIMES
HOW OFTEN DO HEADACHES LIMIT YOUR DAILY ACTIVITIES: SOMETIMES
WHEN YOU HAVE A HEADACHE HOW OFTEN DO YOU WISH YOU COULD LIE DOWN: VERY OFTEN
WHEN YOU HAVE HEADACHES HOW OFTEN IS THE PAIN SEVERE: SOMETIMES
HIT6 TOTAL SCORE: 62
HOW OFTEN HAVE YOU FELT FED UP OR IRRITATED BECAUSE OF YOUR HEADACHES: VERY OFTEN
HOW OFTEN DID HEADACHS LIMIT CONCENTRATION ON WORK OR DAILY ACTIVITY: SOMETIMES
HOW OFTEN DID HEADACHS LIMIT CONCENTRATION ON WORK OR DAILY ACTIVITY: SOMETIMES
HOW OFTEN HAVE YOU FELT TOO TIRED TO WORK BECAUSE OF YOUR HEADACHES: SOMETIMES
WHEN YOU HAVE A HEADACHE HOW OFTEN DO YOU WISH YOU COULD LIE DOWN: VERY OFTEN
WHEN YOU HAVE HEADACHES HOW OFTEN IS THE PAIN SEVERE: SOMETIMES
HOW OFTEN HAVE YOU FELT TOO TIRED TO WORK BECAUSE OF YOUR HEADACHES: SOMETIMES
HIT6 TOTAL SCORE: 62
HOW OFTEN HAVE YOU FELT FED UP OR IRRITATED BECAUSE OF YOUR HEADACHES: VERY OFTEN

## 2022-03-07 NOTE — PROGRESS NOTES
Jemima is a 26 year old who is being evaluated via a billable video visit.      How would you like to obtain your AVS? MyChart  If the video visit is dropped, the invitation should be resent by: Text to cell phone: [phone]  Will anyone else be joining your video visit? No      Video Start Time: 4:29 PM  Video-Visit Details    Type of service:  Video Visit    Video End Time:     Originating Location (pt. Location): Home    Distant Location (provider location):  [facility] NEUROLOGY [facility]     Platform used for Video Visit: Well     MIGRAINE DISABILITY ASSESSMENT (MIDAS)    On how many days in the last 3 months did you miss work or school because of your headaches?  1    How many days in the last 3 months was your productivity at work or school reduced by half or more because of your headaches? (Do not include days you counted in question 1 where you missed work or school.)  3    On how many days in the last 3 months did you not do household work (such as housework, home repairs and maintenance, shopping, caring for children and relatives) because of your headaches?  3    How many days in the last 3 months was your productivity in household work reduced by half or more because of your headaches? (Do not include days you counted in question 3 where you did not do household work).  3    On how many days in the last 3 months did you miss family, social, or lesiure activities because of your headaches?  0    MIDAS Total Score: 10    On how many days in the last 3 months did you have a headache? (If a headache lasted more than 1 day, count each day.)   8    On a scale of 0 - 10, on average how painful were these headaches (where 0 = no pain at all, and 10 = pain as bad as it can be.)  5  Last Patient-Answered HIT-6 Questionnaire  HIT-6 3/7/2022   When you have headaches, how often is the pain severe 10   How often do headaches limit your ability to do usual daily activities including household  work, work, school, or social activities? 10   When you have a headache, how often do you wish you could lie down? 11   In the past 4 weeks, how often have you felt too tired to do work or daily activities because of your headaches 10   In the past 4 weeks, how often have you felt fed up or irritated because of your headaches 11   In the past 4 weeks, how often did headaches limit your ability to concentrate on work or daily activities 10   HIT-6 Total Score 62           [facility]    Jemima Graf MRN# [id number]   Age: 26 year old YOB: 1995     Requesting physician: Marsha Fall            Assessment and Plan:     (G43.009) Migraine without aura and without status migrainosus, not intractable  (primary encounter diagnosis)  Comment: Unclear how pregnancy has effected long term trajectory of migraines. We will observe for now.   Plan: SUMAtriptan (IMITREX) 100 MG tablet reviewed that this is likely safe to use with lactation, could consider discarding breast milk for 4 hours after if she is concerned.         Call if migraines get worse post partum and we will approve Botox    30 minutes spent taking care of the patient on the day of the visit.     Ann Marie Lubin MD             History of Present Illness:   CC: Recheck    Jemima is a 26 year old woman who had severe migraines until she became pregnant. Her migraines are now much better despite having stopped all preventive medicines. She is due shortly and is hopeful her migraines will remain controlled.     Prior to pregnancy she had tried numerous medicines without much benefit.   Previous Treatment Trials:  Acute therapies:  Eletriptan not effective  Sumatriptan  Rizatriptan  Nurtec-worked well, stopped for pregnancy  Toradol helped     Chronic therapies:  Propranolol 60 mg  Not effective  Gabapentin never took due to concern of side effects  Emgality stopped for pregnancy but wasn't sure it was working    Amitriptyline not effective  Sertraline not effective            Physical Exam:              Pertinent History/Data for appointment:

## 2022-03-07 NOTE — LETTER
3/7/2022       RE: Jemima Graf  [id number] [facility] [address]     Dear Colleague,    Thank you for referring your patient, Jemima Graf, to the [facility]UROLOGY CL[facility] [place] [facility]. Please see a copy of my visit note below.    MIGRAINE DISABILITY ASSESSMENT (MIDAS)    On how many days in the last 3 months did you miss work or school because of your headaches?  1    How many days in the last 3 months was your productivity at work or school reduced by half or more because of your headaches? (Do not include days you counted in question 1 where you missed work or school.)  3    On how many days in the last 3 months did you not do household work (such as housework, home repairs and maintenance, shopping, caring for children and relatives) because of your headaches?  3    How many days in the last 3 months was your productivity in household work reduced by half or more because of your headaches? (Do not include days you counted in question 3 where you did not do household work).  3    On how many days in the last 3 months did you miss family, social, or lesiure activities because of your headaches?  0    MIDAS Total Score: 10    On how many days in the last 3 months did you have a headache? (If a headache lasted more than 1 day, count each day.)   8    On a scale of 0 - 10, on average how painful were these headaches (where 0 = no pain at all, and 10 = pain as bad as it can be.)  5  Last Patient-Answered HIT-6 Questionnaire  HIT-6 3/7/2022   When you have headaches, how often is the pain severe 10   How often do headaches limit your ability to do usual daily activities including household work, work, school, or social activities? 10   When you have a headache, how often do you wish you could lie down? 11   In the past 4 weeks, how often have you felt too tired to do work or daily activities because of your headaches 10   In  the past 4 weeks, how often have you felt fed up or irritated because of your headaches 11   In the past 4 weeks, how often did headaches limit your ability to concentrate on work or daily activities 10   HIT-6 Total Score 62           [facility]    Jemima Graf MRN# [id number]   Age: 26 year old YOB: 1995     Requesting physician: Marsha Fall            Assessment and Plan:     (G43.009) Migraine without aura and without status migrainosus, not intractable  (primary encounter diagnosis)  Comment: Unclear how pregnancy has effected long term trajectory of migraines. We will observe for now.   Plan: SUMAtriptan (IMITREX) 100 MG tablet reviewed that this is likely safe to use with lactation, could consider discarding breast milk for 4 hours after if she is concerned.         Call if migraines get worse post partum and we will approve Botox    30 minutes spent taking care of the patient on the day of the visit.     Ann Marie Lubin MD             History of Present Illness:   CC: Recheck    Jemima is a 26 year old woman who had severe migraines until she became pregnant. Her migraines are now much better despite having stopped all preventive medicines. She is due shortly and is hopeful her migraines will remain controlled.     Prior to pregnancy she had tried numerous medicines without much benefit.   Previous Treatment Trials:  Acute therapies:  Eletriptan not effective  Sumatriptan  Rizatriptan  Nurtec-worked well, stopped for pregnancy  Toradol helped     Chronic therapies:  Propranolol 60 mg  Not effective  Gabapentin never took due to concern of side effects  Emgality stopped for pregnancy but wasn't sure it was working   Amitriptyline not effective  Sertraline not effective            Physical Exam:              Pertinent History/Data for appointment:             Ann Marie Lubin MD

## 2022-03-08 ENCOUNTER — PRENATAL OFFICE VISIT (OUTPATIENT)
Dept: OBGYN | Facility: CLINIC | Age: 27
End: 2022-03-08
Payer: COMMERCIAL

## 2022-03-08 VITALS
HEIGHT: 64 IN | WEIGHT: 167.6 LBS | RESPIRATION RATE: 16 BRPM | BODY MASS INDEX: 28.61 KG/M2 | DIASTOLIC BLOOD PRESSURE: 64 MMHG | SYSTOLIC BLOOD PRESSURE: 109 MMHG | HEART RATE: 91 BPM | TEMPERATURE: 97.5 F

## 2022-03-08 DIAGNOSIS — Z34.03 ENCOUNTER FOR SUPERVISION OF NORMAL FIRST PREGNANCY IN THIRD TRIMESTER: Primary | ICD-10-CM

## 2022-03-08 PROCEDURE — 99207 PR PRENATAL VISIT: CPT | Performed by: OBSTETRICS & GYNECOLOGY

## 2022-03-08 RX ORDER — NALOXONE HYDROCHLORIDE 0.4 MG/ML
0.4 INJECTION, SOLUTION INTRAMUSCULAR; INTRAVENOUS; SUBCUTANEOUS
Status: CANCELLED | OUTPATIENT
Start: 2022-03-08

## 2022-03-08 RX ORDER — MISOPROSTOL 200 UG/1
800 TABLET ORAL
Status: CANCELLED | OUTPATIENT
Start: 2022-03-08

## 2022-03-08 RX ORDER — NALOXONE HYDROCHLORIDE 0.4 MG/ML
0.2 INJECTION, SOLUTION INTRAMUSCULAR; INTRAVENOUS; SUBCUTANEOUS
Status: CANCELLED | OUTPATIENT
Start: 2022-03-08

## 2022-03-08 RX ORDER — METOCLOPRAMIDE 10 MG/1
10 TABLET ORAL EVERY 6 HOURS PRN
Status: CANCELLED | OUTPATIENT
Start: 2022-03-08

## 2022-03-08 RX ORDER — MISOPROSTOL 100 UG/1
25 TABLET ORAL
Status: CANCELLED | OUTPATIENT
Start: 2022-03-08

## 2022-03-08 RX ORDER — OXYTOCIN/0.9 % SODIUM CHLORIDE 30/500 ML
100-340 PLASTIC BAG, INJECTION (ML) INTRAVENOUS CONTINUOUS PRN
Status: CANCELLED | OUTPATIENT
Start: 2022-03-08

## 2022-03-08 RX ORDER — PROCHLORPERAZINE MALEATE 10 MG
10 TABLET ORAL EVERY 6 HOURS PRN
Status: CANCELLED | OUTPATIENT
Start: 2022-03-08

## 2022-03-08 RX ORDER — TRANEXAMIC ACID 10 MG/ML
1 INJECTION, SOLUTION INTRAVENOUS EVERY 30 MIN PRN
Status: CANCELLED | OUTPATIENT
Start: 2022-03-08

## 2022-03-08 RX ORDER — IBUPROFEN 200 MG
600 TABLET ORAL
Status: CANCELLED | OUTPATIENT
Start: 2022-03-08 | End: 2022-03-13

## 2022-03-08 RX ORDER — SODIUM CHLORIDE, SODIUM LACTATE, POTASSIUM CHLORIDE, CALCIUM CHLORIDE 600; 310; 30; 20 MG/100ML; MG/100ML; MG/100ML; MG/100ML
INJECTION, SOLUTION INTRAVENOUS CONTINUOUS PRN
Status: CANCELLED | OUTPATIENT
Start: 2022-03-08

## 2022-03-08 RX ORDER — MISOPROSTOL 200 UG/1
400 TABLET ORAL
Status: CANCELLED | OUTPATIENT
Start: 2022-03-08

## 2022-03-08 RX ORDER — KETOROLAC TROMETHAMINE 30 MG/ML
30 INJECTION, SOLUTION INTRAMUSCULAR; INTRAVENOUS
Status: CANCELLED | OUTPATIENT
Start: 2022-03-08 | End: 2022-03-13

## 2022-03-08 RX ORDER — LIDOCAINE 40 MG/G
CREAM TOPICAL
Status: CANCELLED | OUTPATIENT
Start: 2022-03-08

## 2022-03-08 RX ORDER — OXYTOCIN 10 [USP'U]/ML
10 INJECTION, SOLUTION INTRAMUSCULAR; INTRAVENOUS
Status: CANCELLED | OUTPATIENT
Start: 2022-03-08

## 2022-03-08 RX ORDER — CARBOPROST TROMETHAMINE 250 UG/ML
250 INJECTION, SOLUTION INTRAMUSCULAR
Status: CANCELLED | OUTPATIENT
Start: 2022-03-08

## 2022-03-08 RX ORDER — OXYTOCIN/0.9 % SODIUM CHLORIDE 30/500 ML
1-24 PLASTIC BAG, INJECTION (ML) INTRAVENOUS CONTINUOUS
Status: CANCELLED | OUTPATIENT
Start: 2022-03-08

## 2022-03-08 RX ORDER — PROCHLORPERAZINE 25 MG
25 SUPPOSITORY, RECTAL RECTAL EVERY 12 HOURS PRN
Status: CANCELLED | OUTPATIENT
Start: 2022-03-08

## 2022-03-08 RX ORDER — ONDANSETRON 2 MG/ML
4 INJECTION INTRAMUSCULAR; INTRAVENOUS EVERY 6 HOURS PRN
Status: CANCELLED | OUTPATIENT
Start: 2022-03-08

## 2022-03-08 RX ORDER — METHYLERGONOVINE MALEATE 0.2 MG/ML
200 INJECTION INTRAVENOUS
Status: CANCELLED | OUTPATIENT
Start: 2022-03-08

## 2022-03-08 RX ORDER — METOCLOPRAMIDE HYDROCHLORIDE 5 MG/ML
10 INJECTION INTRAMUSCULAR; INTRAVENOUS EVERY 6 HOURS PRN
Status: CANCELLED | OUTPATIENT
Start: 2022-03-08

## 2022-03-08 RX ORDER — ONDANSETRON 4 MG/1
4 TABLET, ORALLY DISINTEGRATING ORAL EVERY 6 HOURS PRN
Status: CANCELLED | OUTPATIENT
Start: 2022-03-08

## 2022-03-08 RX ORDER — OXYTOCIN/0.9 % SODIUM CHLORIDE 30/500 ML
340 PLASTIC BAG, INJECTION (ML) INTRAVENOUS CONTINUOUS PRN
Status: CANCELLED | OUTPATIENT
Start: 2022-03-08

## 2022-03-08 RX ORDER — TERBUTALINE SULFATE 1 MG/ML
0.25 INJECTION, SOLUTION SUBCUTANEOUS
Status: CANCELLED | OUTPATIENT
Start: 2022-03-08

## 2022-03-08 NOTE — PROGRESS NOTES
Concerns:   No vaginal bleeding, LOF, contractions.  No HA, RUQ pain, N/V, visual changes.  Cervix is unchanged from previous exam.  Cephalic position confirmed by Leopold maneuvers and cervical exam.  Discussed indications, risks, complications and failure rate of inductions.  Reportable signs and symptoms discussed.  Labor precautions discussed.  RTC 1 week.    Baljit Soto MD

## 2022-03-08 NOTE — NURSING NOTE
"Initial /64 (BP Location: Right arm, Patient Position: Chair, Cuff Size: Adult Regular)   Pulse 91   Temp 97.5  F (36.4  C) (Tympanic)   Resp 16   Ht 1.626 m (5' 4\")   Wt 76 kg (167 lb 9.6 oz)   LMP 06/03/2021   Breastfeeding No   BMI 28.77 kg/m   Estimated body mass index is 28.77 kg/m  as calculated from the following:    Height as of this encounter: 1.626 m (5' 4\").    Weight as of this encounter: 76 kg (167 lb 9.6 oz). .    Nallely Nash, KAIT    "

## 2022-03-13 ENCOUNTER — NURSE TRIAGE (OUTPATIENT)
Dept: NURSING | Facility: CLINIC | Age: 27
End: 2022-03-13
Payer: COMMERCIAL

## 2022-03-13 ENCOUNTER — HOSPITAL ENCOUNTER (OUTPATIENT)
Facility: CLINIC | Age: 27
Discharge: HOME | End: 2022-03-13
Attending: OBSTETRICS & GYNECOLOGY | Admitting: OBSTETRICS & GYNECOLOGY
Payer: COMMERCIAL

## 2022-03-13 VITALS
HEART RATE: 69 BPM | SYSTOLIC BLOOD PRESSURE: 109 MMHG | RESPIRATION RATE: 16 BRPM | TEMPERATURE: 98.1 F | DIASTOLIC BLOOD PRESSURE: 57 MMHG

## 2022-03-13 PROBLEM — Z34.00 SUPERVISION OF NORMAL FIRST PREGNANCY: Status: ACTIVE | Noted: 2022-03-13

## 2022-03-13 LAB — RUPTURE OF FETAL MEMBRANES BY ROM PLUS: NEGATIVE

## 2022-03-13 PROCEDURE — G0463 HOSPITAL OUTPT CLINIC VISIT: HCPCS

## 2022-03-13 PROCEDURE — 84112 EVAL AMNIOTIC FLUID PROTEIN: CPT | Performed by: OBSTETRICS & GYNECOLOGY

## 2022-03-13 RX ORDER — PROCHLORPERAZINE 25 MG
25 SUPPOSITORY, RECTAL RECTAL EVERY 12 HOURS PRN
Status: DISCONTINUED | OUTPATIENT
Start: 2022-03-13 | End: 2022-03-13 | Stop reason: HOSPADM

## 2022-03-13 RX ORDER — ONDANSETRON 2 MG/ML
4 INJECTION INTRAMUSCULAR; INTRAVENOUS EVERY 6 HOURS PRN
Status: DISCONTINUED | OUTPATIENT
Start: 2022-03-13 | End: 2022-03-13 | Stop reason: HOSPADM

## 2022-03-13 RX ORDER — ONDANSETRON 4 MG/1
4 TABLET, ORALLY DISINTEGRATING ORAL EVERY 6 HOURS PRN
Status: DISCONTINUED | OUTPATIENT
Start: 2022-03-13 | End: 2022-03-13 | Stop reason: HOSPADM

## 2022-03-13 RX ORDER — PROCHLORPERAZINE MALEATE 10 MG
10 TABLET ORAL EVERY 6 HOURS PRN
Status: DISCONTINUED | OUTPATIENT
Start: 2022-03-13 | End: 2022-03-13 | Stop reason: HOSPADM

## 2022-03-13 RX ORDER — METOCLOPRAMIDE HYDROCHLORIDE 5 MG/ML
10 INJECTION INTRAMUSCULAR; INTRAVENOUS EVERY 6 HOURS PRN
Status: DISCONTINUED | OUTPATIENT
Start: 2022-03-13 | End: 2022-03-13 | Stop reason: HOSPADM

## 2022-03-13 RX ORDER — METOCLOPRAMIDE 10 MG/1
10 TABLET ORAL EVERY 6 HOURS PRN
Status: DISCONTINUED | OUTPATIENT
Start: 2022-03-13 | End: 2022-03-13 | Stop reason: HOSPADM

## 2022-03-13 NOTE — PROGRESS NOTES
ROM+ negative, Dr Gillis notified of test result and reactive NST. Patient to return Wednesday for induction of labor. May discharge to home. Discharge instructions reviewed states understanding. Discharged ambulatory to home with FOB.

## 2022-03-13 NOTE — DISCHARGE INSTRUCTIONS
Discharge Instruction for Undelivered Patients      You were seen for: Membrane Assessment  We Consulted: Elis  You had ROM+, fetal monitoring    Diet:   Drink 8 to 12 glasses of liquids (milk, juice, water) every day.     Activity:  Call your doctor or nurse midwife if your baby is moving less than usual.     Call your provider if you notice:  Swelling in your face or increased swelling in your hands or legs.  Headaches that are not relieved by Tylenol (acetaminophen).  Changes in your vision (blurring: seeing spots or stars.)  Nausea (sick to your stomach) and vomiting (throwing up).   Weight gain of 5 pounds or more per week.  Heartburn that doesn't go away.  Signs of bladder infection: pain when you urinate (use the toilet), need to go more often and more urgently.  The bag of bhandari (rupture of membranes) breaks, or you notice leaking in your underwear.  Bright red blood in your underwear.  Abdominal (lower belly) or stomach pain.  For first baby: Contractions (tightening) less than 5 minutes apart for one hour or more.  Second (plus) baby: Contractions (tightening) less than 10 minutes apart and getting stronger.  *If less than 34 weeks: Contractions (tightening) more than 6 times in one hour.  Increase or change in vaginal discharge (note the color and amount)      Follow-up:  As scheduled in the clinic

## 2022-03-13 NOTE — PROGRESS NOTES
Primip arrived with complaints of decreased fetal movement and leaking  fluid after using bathroom. ROM+ obtained and sent to lab. SVE closed, thick, and long. Category 1, reactive NST.

## 2022-03-13 NOTE — TELEPHONE ENCOUNTER
"OB Triage Call      Is patient's OB/Midwife with the formerly [facility] or [facility]? [facility]- Is patient's primary OB a Midwife? No- Proceed with triage.     Reason for call: Possibly rupture of membranes and Decreased fetal movement    Assessment: Patient went to the bathroom and after voiding felt she had a large amount of more fluid come out.  Patient thinks it might be her water.  Patient has not had any leaking of fluid since then.  Patient states she also has not felt her baby move today.  She has been walking around, on the ball and laying down.  Patient denies bleeding or contractions.    Plan: Patient is already at [place] [facility] parking lot.  Plans to go to L&D to be evaluated    Patient's primary OB Provider is Charles.    Per protocol recommendations Patient to be evaluated in L&D.  Patient plans to deliver at Delivering Hospital: [facility] ([phone]).  Labor and Deliver notified of patient's pending arrival.  Report given to Alessio.    Is patient's delivering hospital on divert? No      40w3d    Estimated Date of Delivery: Mar 10, 2022        OB History    Para Term  AB Living   1 0 0 0 0 0   SAB IAB Ectopic Multiple Live Births   0 0 0 0 0      # Outcome Date GA Lbr Rocky/2nd Weight Sex Delivery Anes PTL Lv   1 Current                No results found for: GBS       Mirian Hermosillo RN 22 4:10 PM  [facility] Nurse Advisor    Reason for Disposition    Rupture of membranes, questions about    [1] Pregnant 23 or more weeks AND [2] baby moving less today by kick count  (e.g., kick count < 5 in 1 hour or < 10 in 2 hours)    Additional Information    Negative: [1] SEVERE abdominal pain (e.g., excruciating) AND [2] constant AND [3] present > 1 hour    Negative: Severe bleeding (e.g., continuous red blood from vagina, or large blood clots)    Negative: Umbilical cord hanging out of the vagina (shiny, white, curled appearance, \"like telephone cord\")    Negative: Uncontrollable " urge to push (i.e., feels like baby is coming out now)    Negative: Can see baby    Negative: Sounds like a life-threatening emergency to the triager    Negative: < 20 weeks pregnant    Negative: Vaginal bleeding    Negative: Leakage of fluid from vagina    Negative: Baby moving less today (e.g., kick count < 5 in 1 hour or < 10 in 2 hours)    Negative: Possible incontinence of urine, BUT patient uncertain    Negative: Sounds like a life-threatening emergency to the triager    Negative: Injury to abdomen    Negative: [1] Pregnant > 36 weeks AND [2] having contractions or other symptoms of labor    Negative: [1] Pregnant < 37 weeks AND [2] having contractions or other symptoms of labor    Negative: [1] Pregnant > 20 weeks AND [2] abdominal pain    Negative: [1] Pregnant > 20 weeks AND [2] vaginal bleeding or spotting    Negative: New blurred vision or vision changes    Negative: [1] SEVERE headache AND [2] not relieved with acetaminophen (e.g., Tylenol)    Negative: Leakage of fluid from vagina    Protocols used: PREGNANCY - RUPTURE OF [id number], PREGNANCY - DECREASED FETAL MOVEMENT-A-AH

## 2022-03-16 ENCOUNTER — HOSPITAL ENCOUNTER (INPATIENT)
Facility: CLINIC | Age: 27
LOS: 3 days | Discharge: HOME | End: 2022-03-19
Attending: OBSTETRICS & GYNECOLOGY | Admitting: OBSTETRICS & GYNECOLOGY
Payer: COMMERCIAL

## 2022-03-16 DIAGNOSIS — Z34.03 ENCOUNTER FOR SUPERVISION OF NORMAL FIRST PREGNANCY IN THIRD TRIMESTER: ICD-10-CM

## 2022-03-16 LAB
ABO/RH(D): NORMAL
ANTIBODY SCREEN: NEGATIVE
BASOPHILS # BLD AUTO: 0 10E3/UL (ref 0–0.2)
BASOPHILS NFR BLD AUTO: 0 %
EOSINOPHIL # BLD AUTO: 0.1 10E3/UL (ref 0–0.7)
EOSINOPHIL NFR BLD AUTO: 1 %
ERYTHROCYTE [DISTWIDTH] IN BLOOD BY AUTOMATED COUNT: 12.9 % (ref 10–15)
HCT VFR BLD AUTO: 33.7 % (ref 35–47)
HGB BLD-MCNC: 11.7 G/DL (ref 11.7–15.7)
IMM GRANULOCYTES # BLD: 0.1 10E3/UL
IMM GRANULOCYTES NFR BLD: 2 %
LYMPHOCYTES # BLD AUTO: 1.8 10E3/UL (ref 0.8–5.3)
LYMPHOCYTES NFR BLD AUTO: 22 %
MCH RBC QN AUTO: 30.4 PG (ref 26.5–33)
MCHC RBC AUTO-ENTMCNC: 34.7 G/DL (ref 31.5–36.5)
MCV RBC AUTO: 88 FL (ref 78–100)
MONOCYTES # BLD AUTO: 0.6 10E3/UL (ref 0–1.3)
MONOCYTES NFR BLD AUTO: 7 %
NEUTROPHILS # BLD AUTO: 5.6 10E3/UL (ref 1.6–8.3)
NEUTROPHILS NFR BLD AUTO: 68 %
NRBC # BLD AUTO: 0 10E3/UL
NRBC BLD AUTO-RTO: 0 /100
PLATELET # BLD AUTO: 138 10E3/UL (ref 150–450)
RBC # BLD AUTO: 3.85 10E6/UL (ref 3.8–5.2)
SPECIMEN EXPIRATION DATE: NORMAL
WBC # BLD AUTO: 8.2 10E3/UL (ref 4–11)

## 2022-03-16 PROCEDURE — 86780 TREPONEMA PALLIDUM: CPT | Performed by: OBSTETRICS & GYNECOLOGY

## 2022-03-16 PROCEDURE — 120N000001 HC R&B MED SURG/OB

## 2022-03-16 PROCEDURE — 85025 COMPLETE CBC W/AUTO DIFF WBC: CPT | Performed by: OBSTETRICS & GYNECOLOGY

## 2022-03-16 PROCEDURE — 250N000013 HC RX MED GY IP 250 OP 250 PS 637: Performed by: OBSTETRICS & GYNECOLOGY

## 2022-03-16 PROCEDURE — 36415 COLL VENOUS BLD VENIPUNCTURE: CPT | Performed by: OBSTETRICS & GYNECOLOGY

## 2022-03-16 PROCEDURE — 86850 RBC ANTIBODY SCREEN: CPT | Performed by: OBSTETRICS & GYNECOLOGY

## 2022-03-16 PROCEDURE — 258N000003 HC RX IP 258 OP 636: Performed by: OBSTETRICS & GYNECOLOGY

## 2022-03-16 PROCEDURE — 86901 BLOOD TYPING SEROLOGIC RH(D): CPT | Performed by: OBSTETRICS & GYNECOLOGY

## 2022-03-16 RX ORDER — METOCLOPRAMIDE HYDROCHLORIDE 5 MG/ML
10 INJECTION INTRAMUSCULAR; INTRAVENOUS EVERY 6 HOURS PRN
Status: DISCONTINUED | OUTPATIENT
Start: 2022-03-16 | End: 2022-03-17

## 2022-03-16 RX ORDER — OXYTOCIN/0.9 % SODIUM CHLORIDE 30/500 ML
1-24 PLASTIC BAG, INJECTION (ML) INTRAVENOUS CONTINUOUS
Status: DISCONTINUED | OUTPATIENT
Start: 2022-03-16 | End: 2022-03-17

## 2022-03-16 RX ORDER — PROCHLORPERAZINE 25 MG
25 SUPPOSITORY, RECTAL RECTAL EVERY 12 HOURS PRN
Status: DISCONTINUED | OUTPATIENT
Start: 2022-03-16 | End: 2022-03-17

## 2022-03-16 RX ORDER — METOCLOPRAMIDE 10 MG/1
10 TABLET ORAL EVERY 6 HOURS PRN
Status: DISCONTINUED | OUTPATIENT
Start: 2022-03-16 | End: 2022-03-17

## 2022-03-16 RX ORDER — MISOPROSTOL 200 UG/1
400 TABLET ORAL
Status: DISCONTINUED | OUTPATIENT
Start: 2022-03-16 | End: 2022-03-17

## 2022-03-16 RX ORDER — SODIUM CHLORIDE, SODIUM LACTATE, POTASSIUM CHLORIDE, CALCIUM CHLORIDE 600; 310; 30; 20 MG/100ML; MG/100ML; MG/100ML; MG/100ML
INJECTION, SOLUTION INTRAVENOUS CONTINUOUS PRN
Status: DISCONTINUED | OUTPATIENT
Start: 2022-03-16 | End: 2022-03-17

## 2022-03-16 RX ORDER — METHYLERGONOVINE MALEATE 0.2 MG/ML
200 INJECTION INTRAVENOUS
Status: DISCONTINUED | OUTPATIENT
Start: 2022-03-16 | End: 2022-03-17

## 2022-03-16 RX ORDER — TRANEXAMIC ACID 10 MG/ML
1 INJECTION, SOLUTION INTRAVENOUS EVERY 30 MIN PRN
Status: DISCONTINUED | OUTPATIENT
Start: 2022-03-16 | End: 2022-03-17

## 2022-03-16 RX ORDER — NALOXONE HYDROCHLORIDE 0.4 MG/ML
0.2 INJECTION, SOLUTION INTRAMUSCULAR; INTRAVENOUS; SUBCUTANEOUS
Status: DISCONTINUED | OUTPATIENT
Start: 2022-03-16 | End: 2022-03-17

## 2022-03-16 RX ORDER — NALOXONE HYDROCHLORIDE 0.4 MG/ML
0.4 INJECTION, SOLUTION INTRAMUSCULAR; INTRAVENOUS; SUBCUTANEOUS
Status: DISCONTINUED | OUTPATIENT
Start: 2022-03-16 | End: 2022-03-17

## 2022-03-16 RX ORDER — OXYTOCIN 10 [USP'U]/ML
10 INJECTION, SOLUTION INTRAMUSCULAR; INTRAVENOUS
Status: DISCONTINUED | OUTPATIENT
Start: 2022-03-16 | End: 2022-03-17

## 2022-03-16 RX ORDER — CARBOPROST TROMETHAMINE 250 UG/ML
250 INJECTION, SOLUTION INTRAMUSCULAR
Status: DISCONTINUED | OUTPATIENT
Start: 2022-03-16 | End: 2022-03-17

## 2022-03-16 RX ORDER — MISOPROSTOL 200 UG/1
800 TABLET ORAL
Status: DISCONTINUED | OUTPATIENT
Start: 2022-03-16 | End: 2022-03-17

## 2022-03-16 RX ORDER — LIDOCAINE 40 MG/G
CREAM TOPICAL
Status: DISCONTINUED | OUTPATIENT
Start: 2022-03-16 | End: 2022-03-17

## 2022-03-16 RX ORDER — ONDANSETRON 4 MG/1
4 TABLET, ORALLY DISINTEGRATING ORAL EVERY 6 HOURS PRN
Status: DISCONTINUED | OUTPATIENT
Start: 2022-03-16 | End: 2022-03-17

## 2022-03-16 RX ORDER — OXYTOCIN/0.9 % SODIUM CHLORIDE 30/500 ML
340 PLASTIC BAG, INJECTION (ML) INTRAVENOUS CONTINUOUS PRN
Status: COMPLETED | OUTPATIENT
Start: 2022-03-16 | End: 2022-03-17

## 2022-03-16 RX ORDER — ACETAMINOPHEN 325 MG/1
650 TABLET ORAL EVERY 4 HOURS PRN
Status: DISCONTINUED | OUTPATIENT
Start: 2022-03-16 | End: 2022-03-17

## 2022-03-16 RX ORDER — MISOPROSTOL 100 UG/1
25 TABLET ORAL
Status: DISCONTINUED | OUTPATIENT
Start: 2022-03-16 | End: 2022-03-17

## 2022-03-16 RX ORDER — PROCHLORPERAZINE MALEATE 10 MG
10 TABLET ORAL EVERY 6 HOURS PRN
Status: DISCONTINUED | OUTPATIENT
Start: 2022-03-16 | End: 2022-03-17

## 2022-03-16 RX ORDER — TERBUTALINE SULFATE 1 MG/ML
0.25 INJECTION, SOLUTION SUBCUTANEOUS
Status: DISCONTINUED | OUTPATIENT
Start: 2022-03-16 | End: 2022-03-17

## 2022-03-16 RX ORDER — ONDANSETRON 2 MG/ML
4 INJECTION INTRAMUSCULAR; INTRAVENOUS EVERY 6 HOURS PRN
Status: DISCONTINUED | OUTPATIENT
Start: 2022-03-16 | End: 2022-03-17

## 2022-03-16 RX ADMIN — SODIUM CHLORIDE, POTASSIUM CHLORIDE, SODIUM LACTATE AND CALCIUM CHLORIDE: 600; 310; 30; 20 INJECTION, SOLUTION INTRAVENOUS at 21:12

## 2022-03-16 RX ADMIN — MISOPROSTOL 25 MCG: 100 TABLET ORAL at 19:42

## 2022-03-16 RX ADMIN — MISOPROSTOL 25 MCG: 100 TABLET ORAL at 21:46

## 2022-03-16 RX ADMIN — MISOPROSTOL 25 MCG: 100 TABLET ORAL at 23:46

## 2022-03-16 RX ADMIN — ACETAMINOPHEN 650 MG: 325 TABLET, FILM COATED ORAL at 23:56

## 2022-03-16 ASSESSMENT — ACTIVITIES OF DAILY LIVING (ADL)
DIFFICULTY_COMMUNICATING: NO
DRESSING/BATHING_DIFFICULTY: NO
WALKING_OR_CLIMBING_STAIRS_DIFFICULTY: NO
FALL_HISTORY_WITHIN_LAST_SIX_MONTHS: NO
DOING_ERRANDS_INDEPENDENTLY_DIFFICULTY: NO
CONCENTRATING,_REMEMBERING_OR_MAKING_DECISIONS_DIFFICULTY: NO
HEARING_DIFFICULTY_OR_DEAF: NO
WEAR_GLASSES_OR_BLIND: NO
DIFFICULTY_EATING/SWALLOWING: NO
CHANGE_IN_FUNCTIONAL_STATUS_SINCE_ONSET_OF_CURRENT_ILLNESS/INJURY: NO
TOILETING_ISSUES: NO

## 2022-03-17 ENCOUNTER — ANESTHESIA (OUTPATIENT)
Dept: OBGYN | Facility: CLINIC | Age: 27
End: 2022-03-17
Payer: COMMERCIAL

## 2022-03-17 ENCOUNTER — ANESTHESIA EVENT (OUTPATIENT)
Dept: OBGYN | Facility: CLINIC | Age: 27
End: 2022-03-17
Payer: COMMERCIAL

## 2022-03-17 LAB — T PALLIDUM AB SER QL: NONREACTIVE

## 2022-03-17 PROCEDURE — 3E0R3BZ INTRODUCTION OF ANESTHETIC AGENT INTO SPINAL CANAL, PERCUTANEOUS APPROACH: ICD-10-PCS | Performed by: OBSTETRICS & GYNECOLOGY

## 2022-03-17 PROCEDURE — 250N000011 HC RX IP 250 OP 636: Performed by: NURSE ANESTHETIST, CERTIFIED REGISTERED

## 2022-03-17 PROCEDURE — 0UQMXZZ REPAIR VULVA, EXTERNAL APPROACH: ICD-10-PCS | Performed by: OBSTETRICS & GYNECOLOGY

## 2022-03-17 PROCEDURE — 59400 OBSTETRICAL CARE: CPT | Performed by: OBSTETRICS & GYNECOLOGY

## 2022-03-17 PROCEDURE — 722N000001 HC LABOR CARE VAGINAL DELIVERY SINGLE

## 2022-03-17 PROCEDURE — 3E0DXGC INTRODUCTION OF OTHER THERAPEUTIC SUBSTANCE INTO MOUTH AND PHARYNX, EXTERNAL APPROACH: ICD-10-PCS | Performed by: OBSTETRICS & GYNECOLOGY

## 2022-03-17 PROCEDURE — 250N000013 HC RX MED GY IP 250 OP 250 PS 637: Performed by: OBSTETRICS & GYNECOLOGY

## 2022-03-17 PROCEDURE — 00HU33Z INSERTION OF INFUSION DEVICE INTO SPINAL CANAL, PERCUTANEOUS APPROACH: ICD-10-PCS | Performed by: OBSTETRICS & GYNECOLOGY

## 2022-03-17 PROCEDURE — 0HQ9XZZ REPAIR PERINEUM SKIN, EXTERNAL APPROACH: ICD-10-PCS | Performed by: OBSTETRICS & GYNECOLOGY

## 2022-03-17 PROCEDURE — 250N000009 HC RX 250: Performed by: NURSE ANESTHETIST, CERTIFIED REGISTERED

## 2022-03-17 PROCEDURE — 370N000003 HC ANESTHESIA WARD SERVICE

## 2022-03-17 PROCEDURE — 250N000011 HC RX IP 250 OP 636: Performed by: OBSTETRICS & GYNECOLOGY

## 2022-03-17 PROCEDURE — 250N000009 HC RX 250: Performed by: OBSTETRICS & GYNECOLOGY

## 2022-03-17 PROCEDURE — 120N000001 HC R&B MED SURG/OB

## 2022-03-17 RX ORDER — LIDOCAINE HYDROCHLORIDE AND EPINEPHRINE 15; 5 MG/ML; UG/ML
INJECTION, SOLUTION EPIDURAL PRN
Status: DISCONTINUED | OUTPATIENT
Start: 2022-03-17 | End: 2022-03-17

## 2022-03-17 RX ORDER — OXYTOCIN 10 [USP'U]/ML
10 INJECTION, SOLUTION INTRAMUSCULAR; INTRAVENOUS
Status: DISCONTINUED | OUTPATIENT
Start: 2022-03-17 | End: 2022-03-19 | Stop reason: HOSPADM

## 2022-03-17 RX ORDER — DOCUSATE SODIUM 100 MG/1
100 CAPSULE, LIQUID FILLED ORAL DAILY
Status: DISCONTINUED | OUTPATIENT
Start: 2022-03-17 | End: 2022-03-19 | Stop reason: HOSPADM

## 2022-03-17 RX ORDER — MISOPROSTOL 200 UG/1
800 TABLET ORAL
Status: DISCONTINUED | OUTPATIENT
Start: 2022-03-17 | End: 2022-03-19 | Stop reason: HOSPADM

## 2022-03-17 RX ORDER — ONDANSETRON 4 MG/1
4 TABLET, ORALLY DISINTEGRATING ORAL EVERY 6 HOURS PRN
Status: DISCONTINUED | OUTPATIENT
Start: 2022-03-17 | End: 2022-03-17

## 2022-03-17 RX ORDER — MORPHINE SULFATE 10 MG/ML
10 INJECTION, SOLUTION INTRAMUSCULAR; INTRAVENOUS EVERY 6 HOURS PRN
Status: DISCONTINUED | OUTPATIENT
Start: 2022-03-17 | End: 2022-03-17

## 2022-03-17 RX ORDER — IBUPROFEN 800 MG/1
800 TABLET, FILM COATED ORAL EVERY 6 HOURS PRN
Status: DISCONTINUED | OUTPATIENT
Start: 2022-03-17 | End: 2022-03-19 | Stop reason: HOSPADM

## 2022-03-17 RX ORDER — HYDROMORPHONE HYDROCHLORIDE 1 MG/ML
0.3 INJECTION, SOLUTION INTRAMUSCULAR; INTRAVENOUS; SUBCUTANEOUS
Status: DISCONTINUED | OUTPATIENT
Start: 2022-03-17 | End: 2022-03-19 | Stop reason: HOSPADM

## 2022-03-17 RX ORDER — OXYCODONE HYDROCHLORIDE 5 MG/1
5 TABLET ORAL EVERY 4 HOURS PRN
Status: DISCONTINUED | OUTPATIENT
Start: 2022-03-17 | End: 2022-03-19 | Stop reason: HOSPADM

## 2022-03-17 RX ORDER — TRANEXAMIC ACID 10 MG/ML
1 INJECTION, SOLUTION INTRAVENOUS EVERY 30 MIN PRN
Status: DISCONTINUED | OUTPATIENT
Start: 2022-03-17 | End: 2022-03-19 | Stop reason: HOSPADM

## 2022-03-17 RX ORDER — OXYTOCIN/0.9 % SODIUM CHLORIDE 30/500 ML
340 PLASTIC BAG, INJECTION (ML) INTRAVENOUS CONTINUOUS PRN
Status: DISCONTINUED | OUTPATIENT
Start: 2022-03-17 | End: 2022-03-19 | Stop reason: HOSPADM

## 2022-03-17 RX ORDER — MODIFIED LANOLIN
OINTMENT (GRAM) TOPICAL
Status: DISCONTINUED | OUTPATIENT
Start: 2022-03-17 | End: 2022-03-19 | Stop reason: HOSPADM

## 2022-03-17 RX ORDER — NALOXONE HYDROCHLORIDE 0.4 MG/ML
0.4 INJECTION, SOLUTION INTRAMUSCULAR; INTRAVENOUS; SUBCUTANEOUS
Status: DISCONTINUED | OUTPATIENT
Start: 2022-03-17 | End: 2022-03-19 | Stop reason: HOSPADM

## 2022-03-17 RX ORDER — ONDANSETRON 2 MG/ML
4 INJECTION INTRAMUSCULAR; INTRAVENOUS EVERY 6 HOURS PRN
Status: DISCONTINUED | OUTPATIENT
Start: 2022-03-17 | End: 2022-03-17

## 2022-03-17 RX ORDER — EPHEDRINE SULFATE 50 MG/ML
5 INJECTION, SOLUTION INTRAMUSCULAR; INTRAVENOUS; SUBCUTANEOUS
Status: DISCONTINUED | OUTPATIENT
Start: 2022-03-17 | End: 2022-03-17

## 2022-03-17 RX ORDER — NALOXONE HYDROCHLORIDE 0.4 MG/ML
0.2 INJECTION, SOLUTION INTRAMUSCULAR; INTRAVENOUS; SUBCUTANEOUS
Status: DISCONTINUED | OUTPATIENT
Start: 2022-03-17 | End: 2022-03-19 | Stop reason: HOSPADM

## 2022-03-17 RX ORDER — HYDROCORTISONE 2.5 %
CREAM (GRAM) TOPICAL 3 TIMES DAILY PRN
Status: DISCONTINUED | OUTPATIENT
Start: 2022-03-17 | End: 2022-03-19 | Stop reason: HOSPADM

## 2022-03-17 RX ORDER — BISACODYL 10 MG
10 SUPPOSITORY, RECTAL RECTAL DAILY PRN
Status: DISCONTINUED | OUTPATIENT
Start: 2022-03-17 | End: 2022-03-19 | Stop reason: HOSPADM

## 2022-03-17 RX ORDER — ACETAMINOPHEN 325 MG/1
650 TABLET ORAL EVERY 4 HOURS PRN
Status: DISCONTINUED | OUTPATIENT
Start: 2022-03-17 | End: 2022-03-19 | Stop reason: HOSPADM

## 2022-03-17 RX ORDER — HYDROXYZINE HYDROCHLORIDE 50 MG/1
50 TABLET, FILM COATED ORAL EVERY 6 HOURS PRN
Status: DISCONTINUED | OUTPATIENT
Start: 2022-03-17 | End: 2022-03-17

## 2022-03-17 RX ORDER — EPHEDRINE SULFATE 50 MG/ML
INJECTION, SOLUTION INTRAMUSCULAR; INTRAVENOUS; SUBCUTANEOUS
Status: DISCONTINUED
Start: 2022-03-17 | End: 2022-03-17 | Stop reason: HOSPADM

## 2022-03-17 RX ORDER — MISOPROSTOL 200 UG/1
400 TABLET ORAL
Status: DISCONTINUED | OUTPATIENT
Start: 2022-03-17 | End: 2022-03-19 | Stop reason: HOSPADM

## 2022-03-17 RX ORDER — LIDOCAINE HYDROCHLORIDE 10 MG/ML
INJECTION, SOLUTION INFILTRATION; PERINEURAL PRN
Status: DISCONTINUED | OUTPATIENT
Start: 2022-03-17 | End: 2022-03-17

## 2022-03-17 RX ORDER — FENTANYL CITRATE 50 UG/ML
INJECTION, SOLUTION INTRAMUSCULAR; INTRAVENOUS PRN
Status: DISCONTINUED | OUTPATIENT
Start: 2022-03-17 | End: 2022-03-17

## 2022-03-17 RX ORDER — METHYLERGONOVINE MALEATE 0.2 MG/ML
200 INJECTION INTRAVENOUS
Status: DISCONTINUED | OUTPATIENT
Start: 2022-03-17 | End: 2022-03-19 | Stop reason: HOSPADM

## 2022-03-17 RX ORDER — NALBUPHINE HYDROCHLORIDE 10 MG/ML
2.5-5 INJECTION, SOLUTION INTRAMUSCULAR; INTRAVENOUS; SUBCUTANEOUS EVERY 6 HOURS PRN
Status: DISCONTINUED | OUTPATIENT
Start: 2022-03-17 | End: 2022-03-17

## 2022-03-17 RX ORDER — CARBOPROST TROMETHAMINE 250 UG/ML
250 INJECTION, SOLUTION INTRAMUSCULAR
Status: DISCONTINUED | OUTPATIENT
Start: 2022-03-17 | End: 2022-03-19 | Stop reason: HOSPADM

## 2022-03-17 RX ADMIN — IBUPROFEN 800 MG: 800 TABLET ORAL at 15:45

## 2022-03-17 RX ADMIN — Medication 340 ML/HR: at 09:30

## 2022-03-17 RX ADMIN — FENTANYL CITRATE 100 MCG: 50 INJECTION, SOLUTION INTRAMUSCULAR; INTRAVENOUS at 03:56

## 2022-03-17 RX ADMIN — BUPIVACAINE HYDROCHLORIDE: 7.5 INJECTION, SOLUTION EPIDURAL; RETROBULBAR at 04:04

## 2022-03-17 RX ADMIN — MORPHINE SULFATE 10 MG: 10 INJECTION, SOLUTION INTRAMUSCULAR; INTRAVENOUS at 01:53

## 2022-03-17 RX ADMIN — MISOPROSTOL 25 MCG: 100 TABLET ORAL at 04:30

## 2022-03-17 RX ADMIN — Medication 5 MG: at 05:34

## 2022-03-17 RX ADMIN — MISOPROSTOL 25 MCG: 100 TABLET ORAL at 01:53

## 2022-03-17 RX ADMIN — HYDROXYZINE HYDROCHLORIDE 50 MG: 50 TABLET, FILM COATED ORAL at 01:53

## 2022-03-17 RX ADMIN — LIDOCAINE HYDROCHLORIDE 6 ML: 10 INJECTION, SOLUTION INFILTRATION; PERINEURAL at 03:48

## 2022-03-17 RX ADMIN — BUPIVACAINE HYDROCHLORIDE 5 ML/HR: 7.5 INJECTION, SOLUTION EPIDURAL; RETROBULBAR at 04:00

## 2022-03-17 RX ADMIN — LIDOCAINE HYDROCHLORIDE AND EPINEPHRINE 2 ML: 15; 5 INJECTION, SOLUTION EPIDURAL at 03:57

## 2022-03-17 RX ADMIN — LIDOCAINE HYDROCHLORIDE AND EPINEPHRINE 3 ML: 15; 5 INJECTION, SOLUTION EPIDURAL at 03:56

## 2022-03-17 NOTE — PROGRESS NOTES
Assuming care for this patient, admitted last PM for cervical ripening and pitocin IOL due to postdates pregnancy; this pregnancy complicated by Covid in January; GBS negative  She received 4 doses of Misoprostol then proceeded into spontaneous active labor  SROM within last hour, clear fluid  EFW 7 lbs    Cervix complete/100/0 station    EFM: tier 2, periodic variable decels    A: 41 week pregnancy  Active labor    P:  Labor down x 30 mins, then likely initiate pushing phase  Anticipate     Mini Rivera MD  [facility]

## 2022-03-17 NOTE — PLAN OF CARE
Late entry due to pt care.    Pt feeling very uncomfortable, breathing heavy through ctx and requested epidural. SVE 2/60-70/-2.  Last void @0345. Epidural placed and pt rating pain lower, able to rest. VSS. Will continue to monitor.

## 2022-03-17 NOTE — PLAN OF CARE
Goal Outcome Evaluation:    Pt moved to room  using the myriam steady, 2046 with NB and . Orientated to new room and plan of care for pp and nb cares. Educating and reiterating their cares. Pt attempted to void. No results.  Education folder provided and reviewed. Will continue to assess and monitor

## 2022-03-17 NOTE — L&D DELIVERY NOTE
"25 y/o  admitted at 40w6d for cervical ripening and IOL due to postdates pregnancy; she received 4 doses of Misoprostol and then went into spontaneous labor; she progressed readily, labored down x 1 hours, then pushed through 2 contractions for delivery of liveborn female;  1st degree perineal laceration and bilateral inner labial lacerations noted, and repaired with 3-0 monocryl  Placenta spontaneous, intact  QBL 200cc  \"Attlee\"  Mini Rivera MD  [facility] [place]    "

## 2022-03-17 NOTE — H&P
[facility] Labor and Delivery History and Physical    Jemima Graf MRN# [id number]   Age: 26 year old YOB: 1995     Date of Admission:  3/16/2022    Primary care provider: Marsha Hernandez           Chief Complaint:   Jemima Graf is a 26 year old female who is 41w0d pregnant and being admitted for cervical ripening and IOL due to postdates pregnancy  Pregnancy complicated by Covid 22; otherwise uncomplicated.;; GBS negative.          Pregnancy history:     OBSTETRIC HISTORY:    OB History    Para Term  AB Living   1 0 0 0 0 0   SAB IAB Ectopic Multiple Live Births   0 0 0 0 0      # Outcome Date GA Lbr Rocky/2nd Weight Sex Delivery Anes PTL Lv   1 Current                EDC: Estimated Date of Delivery: 3/10/22    Prenatal Labs:   Lab Results   Component Value Date    AS Negative 2022    HEPBANG Nonreactive 2021    CHPCRT Negative 2021    GCPCRT Negative 2021    HGB 11.7 2022       GBS Status:   No results found for: GBS    Active Problem List  Patient Active Problem List   Diagnosis     ADHD (attention deficit hyperactivity disorder), inattentive type     Generalized anxiety disorder     Migraine without aura and without status migrainosus, not intractable     Overweight     Adopted     Prenatal care, first pregnancy     Supervision of normal first pregnancy     Encounter for supervision of normal first pregnancy in third trimester       Medication Prior to Admission  Medications Prior to Admission   Medication Sig Dispense Refill Last Dose     acetaminophen (TYLENOL) 325 MG tablet Take 325-650 mg by mouth every 6 hours as needed for mild pain    Past Month at Unknown time     calcium carbonate 750 MG CHEW Take 750 mg by mouth daily   3/15/2022 at Unknown time     coenzyme Q-10 200 MG CAPS Take 1 capsule by mouth daily   3/15/2022 at Unknown time     doxylamine (UNISOM) 25 MG TABS tablet Take 25 mg by mouth At  Bedtime    More than a month at Unknown time     Ferrous Sulfate (IRON PO)    3/15/2022 at Unknown time     magnesium oxide (MAG-OX) 400 (241.3 Mg) MG tablet Take 1 tablet (400 mg) by mouth 3 times daily as needed (For headaches) 50 tablet 1 More than a month at Unknown time     metoclopramide (REGLAN) 10 MG tablet Take 1 tablet (10 mg) by mouth 3 times daily as needed (nausea or headache) 10 tablet 0 More than a month at Unknown time     Prenatal Vit-Fe Fumarate-FA (PRENATAL MULTIVITAMIN W/IRON) 27-0.8 MG tablet Take 1 tablet by mouth daily   3/15/2022 at Unknown time     pyridOXINE (VITAMIN B6) 25 MG tablet Take 25 mg by mouth daily   3/15/2022 at Unknown time     SUMAtriptan (IMITREX) 100 MG tablet Take 1 tablet (100 mg) by mouth at onset of headache for migraine May repeat in 2 hours. Max 2 tablets/24 hours. 18 tablet 3 Unknown at Unknown time     Vitamin D3 (CHOLECALCIFEROL) 125 MCG (5000 UT) tablet Take 125 mcg by mouth daily   3/15/2022 at Unknown time   .        Maternal Past Medical History:     Past Medical History:   Diagnosis Date     ADHD (attention deficit hyperactivity disorder), inattentive type      History of migraine headaches     ff Neurology     IUD (intrauterine device) in place                        Family History:     Family History   Adopted: Yes   Problem Relation Age of Onset     Substance Abuse Father         A&D     Depression Father      Congenital Anomalies Mother         born partial deaf     Family history reviewed and updated in [facility]            Social History:     Social History     Tobacco Use     Smoking status: Never Smoker     Smokeless tobacco: Never Used   Substance Use Topics     Alcohol use: Never            Review of Systems:   The Review of Systems is negative other than noted in the HPI          Physical Exam:   Vitals were reviewed  Temp: 97.6  F (36.4  C) Temp src: Oral BP: 98/66 Pulse: 68   Resp: 16 SpO2: 97 % O2 Device: None (Room air)    Constitutional:   awake,  alert, cooperative, no apparent distress, and appears stated age     Lungs:   No increased work of breathing, good air exchange, clear to auscultation bilaterally, no crackles or wheezing     Cardiovascular:   Normal apical impulse, regular rate and rhythm, normal S1 and S2, no S3 or S4, and no murmur noted     Abdomen:   Gravid, S=D  AGA      Cervix:   Membranes: intact   Dilation: closed   Effacement: long   Station:   Consistency: soft   Position: Mid  Presentation:Cephalic  Fetal Heart Rate Tracing: reactive and reassuring, Tier 1 (normal)  Tocometer: external monitor                       Assessment:   Jemima Graf is a 41w0d pregnant female admitted with induction of labor, indication post-dates.          Plan:   Admit - see IP orders  cervical ripening with misoprostol  Labor induction with Pitocin  Pain medication epidural  Anticipate     Mini Rivera MD

## 2022-03-17 NOTE — PLAN OF CARE
Goal Outcome Evaluation:   Labor and delivery met, PP and care plan progressing    S:Delivery  B:Induced  Labor,41w0d    No results found for: GBS with antibiotic treatment not indicated 4 hours prior to delivery.  A: Patient delivered   lac 1st degree at 0927 with Dr. SHIRLENE Rivera in attendance and baby placed on mother's abdomen for delayed cord clamping. Baby dried and stimulated. Baby placed  skin to skin @ 0927.. Apgars 8/9.Delivery .  IV infusion of Oxytocin  infused. Placenta removal spontaneous. MD does not want placenta sent to pathology.  See Flowsheet for VS and PP checks. Delivery QBL (mL): 250 mL.  Labor care plan goals met, transition now to postpartum care. Postpartum   R: Expect routine postpartum care. Anticipate first feeding within the hour or whenever infant displays feeding cues. Continue skin to skin. Prior discussion with mother indicates that feeding plan is Breast feeding . Educated mother on importance of exclusive breastfeeding, expected feeding readiness cues and encouraged her to observe for these cues while rooming in. Informed her that breastfeeding assistance would be provided.

## 2022-03-17 NOTE — PLAN OF CARE
Pt arrived to unit for induction. SVE 0/0-30/-2. VSS. Assessment wnl. Oriented to room and given paperwork. First dose of miso given. Pt having occasional ctx but not feeling them. CAT I FHT.

## 2022-03-17 NOTE — ANESTHESIA PROCEDURE NOTES
Epidural catheter Procedure Note    Pre-Procedure   Staff -        CRNA: Radha Belle APRN CRNA       Performed By: CRNA       Location: OB       Procedure Start/Stop Times: 3/17/2022 3:43 AM and 3/17/2022 4:12 AM       Pre-Anesthestic Checklist: patient identified, IV checked, risks and benefits discussed, informed consent, monitors and equipment checked, pre-op evaluation and at physician/surgeon's request  Timeout:       Correct Patient: Yes        Correct Procedure: Yes        Correct Site: Yes        Correct Position: Yes   Procedure Documentation  Procedure: epidural catheter       Diagnosis: pain       Patient Position: sitting       Patient Prep/Sterile Barriers: sterile gloves, mask, patient draped       Skin prep: DuraPrep       Local skin infiltrated with 6 mL of 1% lidocaine.        Insertion Site: L3-4. (midline approach).       Technique: LORT saline        SETH at 6 cm.       Needle Type: ToSenzariy needle       Needle Gauge: 17.        Needle Length (Inches): 3.5        Catheter: 19 G.         Catheter threaded easily.         Threaded 10 cm at skin.        # of attempts: 1 and  # of redirects:     Assessment/Narrative         Paresthesias: No.       Test dose of 3 mL lidocaine 1.5% w/ 1:200,000 epinephrine at 03:56 CDT.         Test dose negative, 3 minutes after injection, for signs of intravascular, subdural, or intrathecal injection.       Insertion/Infusion Method: LORT saline       Aspiration negative for Heme or CSF via Epidural Catheter.     Comments:  VAS pain score prior to epidural:7    VAS pain score after epidural:1    Pt. Tolerated well, FHR stable.

## 2022-03-17 NOTE — PLAN OF CARE
Pt resting comfortably in bed. CAT II FHT. Repositioned, SVE 6-7/80-90/-1 with a bulging bag. Bloody show. Straight cath at 0630. VSS.

## 2022-03-17 NOTE — PLAN OF CARE
Goal Outcome Evaluation:        Progressing.     Pt up ambulating in room without difficulty. Encouraging frequent feedings. Fundus firm, small amount of bleeding. VSS, voiding x1. When pt goes again, may remove IV. Will continue to assess and monitor

## 2022-03-17 NOTE — PLAN OF CARE
Goal Outcome Evaluation:   Progressing    Pt repositioned and had SROM during active labor. Dr Rivera in room, SVE anterior lip and bag of bhandari, Dr Rivera ruptured rest of BOW. Pt reports no pain, repositioned, left side. Education and emotional support provided. Questions answered. Cat 1 tracing after interventions. No pitocin, 4 doses of miso given overnight. Last dose at 0430. Straight cath at 0630. Will continue to assess and monitor. Mother and  at bedside.

## 2022-03-18 ENCOUNTER — TELEPHONE (OUTPATIENT)
Dept: OBGYN | Facility: CLINIC | Age: 27
End: 2022-03-18
Payer: COMMERCIAL

## 2022-03-18 LAB — HGB BLD-MCNC: 11.2 G/DL (ref 11.7–15.7)

## 2022-03-18 PROCEDURE — 90707 MMR VACCINE SC: CPT | Performed by: OBSTETRICS & GYNECOLOGY

## 2022-03-18 PROCEDURE — 120N000001 HC R&B MED SURG/OB

## 2022-03-18 PROCEDURE — 85018 HEMOGLOBIN: CPT | Performed by: OBSTETRICS & GYNECOLOGY

## 2022-03-18 PROCEDURE — 250N000011 HC RX IP 250 OP 636: Performed by: OBSTETRICS & GYNECOLOGY

## 2022-03-18 PROCEDURE — 90471 IMMUNIZATION ADMIN: CPT | Performed by: OBSTETRICS & GYNECOLOGY

## 2022-03-18 PROCEDURE — 36415 COLL VENOUS BLD VENIPUNCTURE: CPT | Performed by: OBSTETRICS & GYNECOLOGY

## 2022-03-18 PROCEDURE — 250N000013 HC RX MED GY IP 250 OP 250 PS 637: Performed by: OBSTETRICS & GYNECOLOGY

## 2022-03-18 RX ADMIN — IBUPROFEN 800 MG: 800 TABLET ORAL at 19:59

## 2022-03-18 RX ADMIN — MEASLES, MUMPS, AND RUBELLA VIRUS VACCINE LIVE 0.5 ML: 1000; 12500; 1000 INJECTION, POWDER, LYOPHILIZED, FOR SUSPENSION SUBCUTANEOUS at 14:52

## 2022-03-18 RX ADMIN — DOCUSATE SODIUM 100 MG: 100 CAPSULE, LIQUID FILLED ORAL at 07:26

## 2022-03-18 RX ADMIN — IBUPROFEN 800 MG: 800 TABLET ORAL at 07:26

## 2022-03-18 RX ADMIN — IBUPROFEN 800 MG: 800 TABLET ORAL at 13:18

## 2022-03-18 NOTE — PROGRESS NOTES
PPD # 1    S: patient without complaints.  Ambulating, voiding and bleeding slowing.   O: /62 (BP Location: Right arm, Patient Position: Right side, Cuff Size: Adult Regular)   Pulse 80   Temp 97.6  F (36.4  C) (Oral)   Resp 16   Wt 76.4 kg (168 lb 6.4 oz)   LMP 2021   SpO2 95%   Breastfeeding No   BMI 28.91 kg/m     NAD  Abd: soft, nontender, fundus firm  Ext: calves nontender    Component      Latest Ref Rng & Units 3/16/2022 3/18/2022   Hemoglobin      11.7 - 15.7 g/dL 11.7 11.2 (L)       A/P PPD # 1 s/p     Routine PP care.    Izzy Nelson M.D.

## 2022-03-18 NOTE — TELEPHONE ENCOUNTER
Completed paperwork was dropped off at the clinic - completed and given to Dr. Rivera to sign.    -Sanjana COMER [facility]  Clinic Station

## 2022-03-18 NOTE — ANESTHESIA POSTPROCEDURE EVALUATION
Patient: Jemima Graf    Procedure: * No procedures listed *       Anesthesia Type:  Epidural    Note:  Disposition: Inpatient   Postop Pain Control: Uneventful            Sign Out: Well controlled pain   PONV: No   Neuro/Psych: Uneventful            Sign Out: Acceptable/Baseline neuro status   Airway/Respiratory: Uneventful            Sign Out: Acceptable/Baseline resp. status   CV/Hemodynamics: Uneventful            Sign Out: Acceptable CV status; No obvious hypovolemia; No obvious fluid overload   Other NRE: NONE   DID A NON-ROUTINE EVENT OCCUR? No           Last vitals:  Vitals Value Taken Time   BP     Temp     Pulse     Resp     SpO2         Electronically Signed By: HERMES Marin CRNA  March 18, 2022  5:05 PM

## 2022-03-18 NOTE — PLAN OF CARE
Goal Outcome Evaluation:    Data: Vital signs within normal limits. Postpartum checks within normal limits - see flow record. Patient eating and drinking normally. Patient able to empty bladder independently and is up ambulating. No apparent signs of infection.  First degree  healing well. Patient performing self cares and is able to care for infant.  Action: Patient medicated during the shift for pain. See MAR. Patient reassessed within 1 hour after each medication and pain was improved - patient stated she was comfortable.  See flow record.  Response: Positive attachment behaviors observed with infant. Support persons Gavin and pt's Mom is present.   Plan: Anticipate discharge on 03/19/22.

## 2022-03-19 VITALS
SYSTOLIC BLOOD PRESSURE: 109 MMHG | BODY MASS INDEX: 28.91 KG/M2 | DIASTOLIC BLOOD PRESSURE: 61 MMHG | WEIGHT: 168.4 LBS | OXYGEN SATURATION: 96 % | HEART RATE: 66 BPM | RESPIRATION RATE: 16 BRPM | TEMPERATURE: 97.6 F

## 2022-03-19 PROCEDURE — 250N000013 HC RX MED GY IP 250 OP 250 PS 637: Performed by: OBSTETRICS & GYNECOLOGY

## 2022-03-19 RX ADMIN — ACETAMINOPHEN 650 MG: 325 TABLET, FILM COATED ORAL at 03:46

## 2022-03-19 RX ADMIN — IBUPROFEN 800 MG: 800 TABLET ORAL at 03:46

## 2022-03-19 RX ADMIN — IBUPROFEN 800 MG: 800 TABLET ORAL at 10:10

## 2022-03-19 RX ADMIN — DOCUSATE SODIUM 100 MG: 100 CAPSULE, LIQUID FILLED ORAL at 07:57

## 2022-03-19 NOTE — PLAN OF CARE
Goal Outcome Evaluation: Patient discharged per ambulatory with infant in car seat. Mother verified that her band matches her infant's band by comparing the infant's  MR#.  Discharge instructions given. Encouraged to call for any problems, questions or concerns. RXs none.

## 2022-03-19 NOTE — DISCHARGE SUMMARY
[facility] Discharge Summary    Jemima Graf MRN# [id number]   Age: 26 year old YOB: 1995     Date of Admission:  3/16/2022  Date of Discharge::  3/19/2022  Admitting Physician:  Holli Gillis MD  Discharge Physician:  Izzy Nelson MD     Home clinic: [facility]          Admission Diagnoses:   Intrauterine pregnancy at 41w0d  weeks gestation          Discharge Diagnosis:   Normal spontaneous vaginal delivery  Intrauterine pregnancy at 41w0d  weeks gestation          Procedures:   Procedure(s): No additional procedures performed       No other procedures performed during this admission           Medications Prior to Admission:     Medications Prior to Admission   Medication Sig Dispense Refill Last Dose     acetaminophen (TYLENOL) 325 MG tablet Take 325-650 mg by mouth every 6 hours as needed for mild pain    Past Month at Unknown time     calcium carbonate 750 MG CHEW Take 750 mg by mouth daily   3/15/2022 at Unknown time     coenzyme Q-10 200 MG CAPS Take 1 capsule by mouth daily   3/15/2022 at Unknown time     Ferrous Sulfate (IRON PO)    3/15/2022 at Unknown time     magnesium oxide (MAG-OX) 400 (241.3 Mg) MG tablet Take 1 tablet (400 mg) by mouth 3 times daily as needed (For headaches) 50 tablet 1 More than a month at Unknown time     Prenatal Vit-Fe Fumarate-FA (PRENATAL MULTIVITAMIN W/IRON) 27-0.8 MG tablet Take 1 tablet by mouth daily   3/15/2022 at Unknown time     SUMAtriptan (IMITREX) 100 MG tablet Take 1 tablet (100 mg) by mouth at onset of headache for migraine May repeat in 2 hours. Max 2 tablets/24 hours. 18 tablet 3 Unknown at Unknown time     Vitamin D3 (CHOLECALCIFEROL) 125 MCG (5000 UT) tablet Take 125 mcg by mouth daily   3/15/2022 at Unknown time     [DISCONTINUED] doxylamine (UNISOM) 25 MG TABS tablet Take 25 mg by mouth At Bedtime    More than a month at Unknown time     [DISCONTINUED] metoclopramide (REGLAN) 10 MG  tablet Take 1 tablet (10 mg) by mouth 3 times daily as needed (nausea or headache) 10 tablet 0 More than a month at Unknown time     [DISCONTINUED] pyridOXINE (VITAMIN B6) 25 MG tablet Take 25 mg by mouth daily   3/15/2022 at Unknown time             Discharge Medications:     Current Discharge Medication List      CONTINUE these medications which have NOT CHANGED    Details   acetaminophen (TYLENOL) 325 MG tablet Take 325-650 mg by mouth every 6 hours as needed for mild pain       calcium carbonate 750 MG CHEW Take 750 mg by mouth daily      coenzyme Q-10 200 MG CAPS Take 1 capsule by mouth daily      Ferrous Sulfate (IRON PO)       magnesium oxide (MAG-OX) 400 (241.3 Mg) MG tablet Take 1 tablet (400 mg) by mouth 3 times daily as needed (For headaches)  Qty: 50 tablet, Refills: 1    Associated Diagnoses: Pregnancy headache in first trimester      Prenatal Vit-Fe Fumarate-FA (PRENATAL MULTIVITAMIN W/IRON) 27-0.8 MG tablet Take 1 tablet by mouth daily      SUMAtriptan (IMITREX) 100 MG tablet Take 1 tablet (100 mg) by mouth at onset of headache for migraine May repeat in 2 hours. Max 2 tablets/24 hours.  Qty: 18 tablet, Refills: 3    Associated Diagnoses: Migraine without aura and without status migrainosus, not intractable      Vitamin D3 (CHOLECALCIFEROL) 125 MCG (5000 UT) tablet Take 125 mcg by mouth daily         STOP taking these medications       doxylamine (UNISOM) 25 MG TABS tablet Comments:   Reason for Stopping:         metoclopramide (REGLAN) 10 MG tablet Comments:   Reason for Stopping:         pyridOXINE (VITAMIN B6) 25 MG tablet Comments:   Reason for Stopping:                     Consultations:   No consultations were requested during this admission          Brief History of Labor:   25 y/o  admitted at 40w6d for cervical ripening and IOL due to postdates pregnancy; she received 4 doses of Misoprostol and then went into spontaneous labor; she progressed readily, labored down x 1 hours, then pushed  "through 2 contractions for delivery of liveborn female;  1st degree perineal laceration and bilateral inner labial lacerations noted, and repaired with 3-0 monocryl  Placenta spontaneous, intact  QBL 200cc  \"Attlee\"           Hospital Course:   The patient's hospital course was unremarkable.  On discharge, her pain was well controlled. Vaginal bleeding is similar to peak menstrual flow.  Voiding without difficulty.  Ambulating well and tolerating a normal diet.  No fever.  Breastfeeding well.  Infant is stable.    She was discharged on post-partum day #2.    Post-partum hemoglobin:   Hemoglobin   Date Value Ref Range Status   03/18/2022 11.2 (L) 11.7 - 15.7 g/dL Final             Discharge Instructions and Follow-Up:   Discharge diet: Regular   Discharge activity: No sex for 6 week(s)   Discharge follow-up: Follow up with OB clinic in 6 weeks   Wound care: Drink plenty of fluids  Ice to area for comfort           Discharge Disposition:   Discharged to home      Attestation:  I have reviewed today's vital signs, notes, medications, labs and imaging.    Izzy Nelson MD     "

## 2022-03-19 NOTE — PLAN OF CARE
Data: Vital signs within normal limits. Postpartum checks within normal limits - see flow record. Patient is tolerating po intake. Patient is able to empty bladder independently. Patient ambulating independently. No apparent signs of infection. Lac 1st degree healing well. Patient is performing self cares and is able to care for infant. Positive attachment behaviors are observed with infant. Support persons are present.    Action:  Pain plan was discussed. Patient will request pain med when she is ready for it. Patient was medicated during the shift for cramping. See MAR.Patient education done about breastfeeding,  cares, pain management/plan, fall risk, and rest. See flow record.    Response:   Patient reassessed within 1 hour after each medication for pain. Patient stated that pain had improved. Patient stated that she was comfortable. .     Plan: Anticipate discharge on 22.     Disha Kim RN on 3/19/2022 at 4:00 AM

## 2022-03-19 NOTE — PLAN OF CARE
Data: Vital signs within normal limits. Postpartum checks within normal limits - see flow record. Patient  Is tolerating po intake. Patient is able to empty bladder independently. . Patient ambulating independently..   No apparent signs of infection. Lac 1st degree healing well. Patient Is performing self cares and Is able to care for infant. Positive attachment behaviors are observed with infant. Support persons are present.  Action:  Pain plan was discussed. Patient will request pain med when she is ready for it. Patient was medicated during the shift for pain. See MAR.Patient education done about breastfeeding,  cares, postpartum cares, rest, and discharge from hospital. See flow record.  Response:   Patient reassessed within 1 hour after each medication for pain. Patient stated that pain had improved. Patient stated that she was comfortable. .   Plan: Anticipate discharge on 3/19/22.

## 2022-03-22 RX ORDER — MAGNESIUM OXIDE 400 MG/1
TABLET ORAL
COMMUNITY
Start: 2021-08-26 | End: 2022-08-22

## 2022-03-22 RX ORDER — PREDNISONE 20 MG/1
TABLET ORAL
COMMUNITY
Start: 2021-05-26 | End: 2022-03-23

## 2022-03-22 RX ORDER — CYCLOBENZAPRINE HCL 10 MG
1 TABLET ORAL 3 TIMES DAILY PRN
COMMUNITY
Start: 2021-05-21 | End: 2022-03-23

## 2022-03-22 RX ORDER — DEXTROAMPHETAMINE SACCHARATE, AMPHETAMINE ASPARTATE, DEXTROAMPHETAMINE SULFATE AND AMPHETAMINE SULFATE 1.25; 1.25; 1.25; 1.25 MG/1; MG/1; MG/1; MG/1
1 TABLET ORAL DAILY
COMMUNITY
Start: 2021-05-11 | End: 2022-03-23

## 2022-03-22 NOTE — PROGRESS NOTES
"Jemima  is a 26 year old who is being evaluated via a billable video visit.      How would you like to obtain your AVS? MyChart  If the video visit is dropped, the invitation should be resent by: Text to cell phone: [phone]  Will anyone else be joining your video visit? No      Video Start Time: 2:05 PM    Vitals:  No vitals were obtained today due to virtual visit.        [facility] Lactation Video Visit    Assessment:   1.  Six day old infant gaining weight well on exclusive breastfeeding:  Gaining over discharge weight  2.  Mother reports signs of good latch, suck and milk transfer   3.  Mother with ample milk supply, pumping about 12 oz/day over baby's needs  4.  Mother with return to work concerns        Plan:  1.  Continue to nurse baby on cue, 8-12 times each day.  Feed on one side until baby finishes swallowing.  Once swallowing slows, use breast compression to encourage more swallowing, but once there is no more active swallowing, and baby is either sleeping, coming off the breast, or just \"nibbling,\" it is OK to use a finger to take baby off the breast and move to the other breast.  Do the same on the other side. Offer both breasts at each feeding.  It is more important to watch the baby than the clock!   2.  If your breasts are very full and uncomfortable, you can use a little heat on them during feeding or pumping, and some gentle massage.  If they remain very full and uncomfortable even after feeding, try using ice.  The extreme breast engorgement will resolve on its own in a few days.  3.  Since you are pumping so much more than your baby needs right now, you can decrease your time spent pumping.  This will help your supply be in line with her needs, and prevent you from being uncomfortable with a lot of fullness. When you pump, instead of pumping until 6 oz come, stop after 5 oz.  Then drop back by another ounce, and then another, until you can comfortably drop that pumping entirely.  " If you would like to continue to pump once/day to have a little extra milk for her to take in bottles, you can do that.  Many people find that first thing in the morning is a convenient and productive time to pump, but you can do it whenever you like.   4.  Consider introducing a bottle at 2-3 weeks, to help your baby accept bottles once you return to work.  Giving bottles with the paced feeding method is the best way to ensure she doesn't get more milk than she needs, help her to not be overwhelmed by milk flow, and go more easily between bottle and breast.   5.  Pumping now to build up a large store of milk before return to work is not necessary or advisable, as relying on previously-pumped milk can decrease milk supply once baby is in childcare.  6.  Once you return to work, aim to pump 2-3 times/day, so that you can have milk for Essence to have the next day while you are gone, and also to keep your breasts comfortable and your milk supply stable.  Plan to provide about 3 bottles in a day for Essence when she is with her grandmother, each with 3-4 oz.  Once babies are 9-10 pounds, they need about 25-30 of milk in a day (combination of breast and bottlefeeding), and this where their needs stay for their entire first year, so you do not need to be concerned that you need to keep increasing the amount you provide for her.  7.  See pediatric provider as planned, and lactation as needed.  Given contact info for outpatient lactation assistance at [facility] [place]. [facility] can be used for brief questions, but it's important to know that messages are not seen Friday through Sunday. If urgent help is needed, Monday through Friday you can call [phone] and one of our lactation consultants will get the message and respond; if you need a rapid response over a weekend or holiday, it is best to call your on-call maternity or pediatric provider.  Please feel free to schedule a return visit if the concern is more  detailed;  telephone visits are also an option if you don't feel you need to be seen in person.  8. All plan information conveyed to patient via MyChart as well as verbally.      Subjective: Call to Jemima Graf for first video visit. Baby Essence is now six days old. Jemima reports that she has an excellent milk supply, even somewhat overabundant, and would like to know how to manage that.  Began pumping her breasts for colostrum a week before birth as advised by OB provider, and has 20 oz colostrum saved in freezer!  She is now pumping twice daily in addition to nursing and releasing about 6 oz/session. Feels baby is nursing well, no nipple pain and baby has good output, but Jemima notes she sometimes tends to choke/sputter a bit at end of feeding, with milk spilling out of her mouth.  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     She reports that baby's most recent weight was 6 # 9.5 oz two days ago, which is 2.5 oz over discharge weight.      Breastfeeding Goals: continued exclusive breastfeeding, with ability to offer bottles of pumped milk to prepare for early return to work     Maternal depression screening: EPDS not completed today    Hospital Course: Induced for postdates with cervical ripening, after which Jemima moved into active labor and had uncomplicated birth at [facility].    Mother's Relevant Med/Surg History: ADHD, migraines, Covid infection 1/14/22, anxiety.  Jemima's maternal grandmother had CF.    Breast Surgery: none    Previous Breastfeeding Experience: first baby    Infant's name: Essence  Infant's bday: 3[birth year]17/22  Gestational age: 41w0d  Infant's birth weight: 6 # 12 oz    Mode of delivery: vaginal  Pediatric Provider: [facility]  Discharge weight: 6 # 7 oz  Recent weight 3/21/22:  6 # 7 oz    Frequency and duration of feedings: every 2-3 hours and latching  for about 30 -45  minutes each nursing session.   Swallows audible per mother: yes  Numbers of feedings in 24 hours: 8  Number urines per day: 8 +  Number of stools per day and their color: 2-3, yellow    Supplementation:  None  Pumping: about twice per day, yielding about 6 oz per pumping session     Jemima reports that she noticed her breasts grew larger and areolas darkened during pregnancy and she noticed primary engorgement when her milk came in on day 1.  Jemima reports her nipples appear everted and the breast is full.     Sore nipples: no    Feeding assessment done by report--no observed feeding during visit today.   Baby is arousable and will root for nipple. Mother denies visible jaundice. She reports that she has seen baby protrude tongue past bottom gumline.     Feeding assessment: Baby can hold suction with tongue while at the breast.     Areolar Grasp: Baby is able to open mouth wide and baby has complete seal.   Areolar Compression: Baby makes rhythmic motion. There is no severe nipple discomfort.     Audible swallowing: Baby makes quiet sounds of swallowing: The milk ejection reflex is normal and milk supply is normal.       Current Outpatient Medications:      acetaminophen (TYLENOL) 325 MG tablet, Take 325-650 mg by mouth every 6 hours as needed for mild pain , Disp: , Rfl:      coenzyme Q-10 200 MG CAPS, Take 1 capsule by mouth daily, Disp: , Rfl:      Ferrous Sulfate (IRON PO), , Disp: , Rfl:      magnesium oxide (MAG-OX) 400 (241.3 Mg) MG tablet, Take 1 tablet (400 mg) by mouth 3 times daily as needed (For headaches), Disp: 50 tablet, Rfl: 1     magnesium oxide (MAG-OX) 400 MG tablet, , Disp: , Rfl:      Prenatal Vit-Fe Fumarate-FA (PRENATAL MULTIVITAMIN W/IRON) 27-0.8 MG tablet, Take 1 tablet by mouth daily, Disp: , Rfl:      SUMAtriptan (IMITREX) 100 MG tablet, Take 1 tablet (100 mg) by mouth at onset of headache for migraine May repeat in 2 hours. Max 2 tablets/24 hours., Disp: 18  tablet, Rfl: 3     Vitamin D3 (CHOLECALCIFEROL) 125 MCG (5000 UT) tablet, Take 125 mcg by mouth daily, Disp: , Rfl:   Past Medical History:   Diagnosis Date     ADHD (attention deficit hyperactivity disorder), inattentive type      History of migraine headaches     ff Neurology     Past Surgical History:   Procedure Laterality Date     FOOT SURGERY Right      ORTHOPEDIC SURGERY       Family History   Adopted: Yes   Problem Relation Age of Onset     Substance Abuse Father         A&D     Depression Father      Congenital Anomalies Mother         born partial deaf         Video-Visit Details    Type of service:  Video Visit    Video End Time:2:36 PM    Originating Location (pt. Location): Home    Distant Location (provider location):  [facility]     Platform used for Video Visit: [facility]    Time spent:  Chart review/precharting: 10 min prior to day of service  Video visit:   31 min   Documentation:  20 min   Total time spent on day of service: 51 min    Beatriz Rangel, HERMES, CNM, IBCLC

## 2022-03-23 ENCOUNTER — VIRTUAL VISIT (OUTPATIENT)
Dept: OBGYN | Facility: CLINIC | Age: 27
End: 2022-03-23
Payer: COMMERCIAL

## 2022-03-23 DIAGNOSIS — N64.59 BREAST ENGORGEMENT: Primary | ICD-10-CM

## 2022-03-23 DIAGNOSIS — O92.79 OTHER DISORDERS OF LACTATION: ICD-10-CM

## 2022-03-23 PROCEDURE — 99215 OFFICE O/P EST HI 40 MIN: CPT | Mod: 24 | Performed by: ADVANCED PRACTICE MIDWIFE

## 2022-03-23 NOTE — PATIENT INSTRUCTIONS
"  1.  Continue to nurse baby on cue, 8-12 times each day.  Feed on one side until baby finishes swallowing.  Once swallowing slows, use breast compression to encourage more swallowing, but once there is no more active swallowing, and baby is either sleeping, coming off the breast, or just \"nibbling,\" it is OK to use a finger to take baby off the breast and move to the other breast.  Do the same on the other side. Offer both breasts at each feeding.  It is more important to watch the baby than the clock!   2.  If your breasts are very full and uncomfortable, you can use a little heat on them during feeding or pumping, and some gentle massage.  If they remain very full and uncomfortable even after feeding, try using ice.  The extreme breast engorgement will resolve on its own in a few days.  3.  Since you are pumping so much more than your baby needs right now, you can decrease your time spent pumping.  This will help your supply be in line with her needs, and prevent you from being uncomfortable with a lot of fullness. When you pump, instead of pumping until 6 oz come, stop after 5 oz.  Then drop back by another ounce, and then another, until you can comfortably drop that pumping entirely.  If you would like to continue to pump once/day to have a little extra milk for her to take in bottles, you can do that.  Many people find that first thing in the morning is a convenient and productive time to pump, but you can do it whenever you like.   4.  Consider introducing a bottle at 2-3 weeks, to help your baby accept bottles once you return to work.  Giving bottles with the paced feeding method is the best way to ensure she doesn't get more milk than she needs, help her to not be overwhelmed by milk flow, and go more easily between bottle and breast.   5.  Pumping now to build up a large store of milk before return to work is not necessary or advisable, as relying on previously-pumped milk can decrease milk supply once " baby is in childcare.  6.  Once you return to work, aim to pump 2-3 times/day, so that you can have milk for Essence to have the next day while you are gone, and also to keep your breasts comfortable and your milk supply stable.  Plan to provide about 3 bottles in a day for Essence when she is with her grandmother, each with 3-4 oz.  Once babies are 9-10 pounds, they need about 25-30 of milk in a day (combination of breast and bottlefeeding), and this where their needs stay for their entire first year, so you do not need to be concerned that you need to keep increasing the amount you provide for her.  7.  See pediatric provider as planned, and lactation as needed.  [employer] can be used for brief questions, but it's important to know that messages are not seen Friday through Sunday. If urgent help is needed, Monday through Friday you can call [phone] and one of our lactation consultants will get the message and respond; if you need a rapid response over a weekend or holiday, it is best to call your on-call maternity or pediatric provider.  Please feel free to schedule a return visit if the concern is more detailed;  telephone visits are also an option if you don't feel you need to be seen in person.    ___________________    For good videos on the laid-back breastfeeding position:  Www.My Artful Jewelsbreastfeeding.[employer]  Www.MYOS    ____________    For a good video showing sidelying breastfeeding:   Https://www.youtube.com/watch?v=HGH1qinTfnD    ________________    Explanation and videos on how to do paced bottlefeeding:        https://www.[employer]/blog/aud-ul-qxev-a-bottle-to-a--baby-introducing-a-bottle-part-2/      http://www.lowmilksupply.org-paced-feeding/    ______________      Regarding milk storage:  Milk can be left out on the counter for 4 hours, stored in the fridge for 4 days, in the freezer (separate door from the fridge) for 3-6 months, and in a deep freeze for 6-12  months.    CDC recommendations on milk storage:   Https://www.cdc.gov/breastfeeding/recommendations/handling_breastmilk.htm      When mixing milk from separate pumping sessions, do not pour warm milk in with already-chilled milk from the refrigerator.  Put the fresher milk in the refrigerator until it is cold, and then you can combine the two, once they are the same temperature. Keeping milk in 2-4 oz amounts is good, as  babies rarely take more than 5 oz at a feeding, even in the second half of the first year.      When warming milk, warm the bottle in a little bowl or cup of hot water;  Don't use the microwave.  It breaks down some of the proteins, and parts of the milk can get too hot.    And here's the official word on how to clean the pump:  You need to clean the pump after every time you use it, and sanitize it once a day. Cleaning means to remove any milk or formula residue or debris from the item while sanitizing means to heat the items to a temperature that actually kills any germs on the items so they do not make you or your baby sick.  You cannot clean AND sanitize in one step- they are two different processes and are both important!     Cleaning  -      Wash hands prior to cleaning supplies.           Separate all components of supplies.           Rinse supplies under running water.            Fill wash basin with hot water and add dish soap and place all items in a clean basin or container used only to clean infant feeding items.            Scrub items using a clean brush (like a bottle brush) that is used only to clean infant feeding items.           Squeeze water through nipple holes to be sure they get cleaned.             Rinse again under running water.           Clean wash basin and cleaning brush after each use.           Allow to air-dry:  o   Do not rub or pat items dry with a towel.  Doing so may transfer germs to the items.  A clean towel is fine as a drying surface.            "Inspect supplies:   o   Nipples, nipple shields, and pacifiers should be clear, never cloudy.   o   Replace if supplies remain cloudy after proper cleaning.    For sanitizing, you can use either those bags or containers that are sold for use in a microwave, following the directions that come with the items, or your  set on \"sanitize.\"  If you put the small parts (like the little valve cover) in the , make sure it's well-contained so it won't drop down into the bottom of the  and get lost or melted!    ________________    Providing milk while you are at work:      It can sometimes be very stressful to pump the amount of milk needed to supply your baby at  while you are at work.  It's also a vicious [place], because the more you worry about it (sit at the pump and worry that not enough is being released) the less likely you are to have a good letdown reflex and release lots of milk!  A few things that have worked for other mothers:      1.  Many  babies do not need as much milk is being offered to them, so check with your childcare provider to see how much milk is being warmed up vs. how much milk your baby is actually drinking.  Sometimes on further questioning of the childcare providers, we discover that 5 oz were warmed up, the baby took 3 oz, and the extra 2 were tossed, which is a frustrating waste of your hard-won breastmilk!   If that is the issue, this can be solved by putting smaller amounts of milk in the bottles. If you want to leave, say, 15 oz of milk for the day, you could leave 5 bottles of 3 oz each rather than 3 bottles of 5 oz each. This can help because then there isn't so much in each bottle to warm up and waste--any bottles that are not used can then just be saved until the next day and you have less pressure to produce more.      This technique can also work even if you discover that your baby has been taking the entire bottle--It's important to " remember that when taking a bottle, a baby often doesn't have a lot of choice about whether or not to finish a bottle.  If they are awake they have to keep swallowing while the nipple is in their mouth, unlike the breast, where they can control the flow. By using smaller amounts in the bottle, providers may find that your baby is actually satisfied after, say,  3 oz rather than just continuing until the 5 oz is gone.  If baby takes the 3 oz and is still obviously hungry, then another bottle can be warmed, but if she is content, then the bottle is saved.   So those are a few thoughts on how to make the best use of the milk that you have.     2.  Another approach is to try to increase the amount of milk to send to childcare.  One way to do that is to pump a little bit while you are at home.  If your baby does not always take both breasts, you could pump the breast that she is not nursing from while she is feeding from the other.  This is a great way to get a good letdown and get more milk, because we often have a better letdown to our baby than we do to the pump (because pumping is not as inspiring and we often do not let down as well to our pump as we do to our baby).   Some women add a pumping first thing in the morning before the baby has fed, because our breasts are often most full in the mornings.   Other women add a few pumpings over the weekends, just to have that extra.   It may not be feasible in your workday, but some women can  squeeze in another pumping session at work. Another idea is to pump one breast at a time rather double pumping, and use the free hand to massage the breast being pumped--research has shown that that can significantly increase   the output of milk.  You can also do a little hand expression after the electric pumping and that has been shown can get some additional milk as well.       3.  A third angle is to minimize the number of bottles your baby needs while you are gone.  Try nursing  immediately before going to work, either at home right before going out the door, or even at the childcare if possible, and asking your childcare provider not to feed her for the last hour before your arrive back, so that you can nurse again immediately upon your return.  Sometimes this can save on an entire bottle during the day.  For older babies, you can also have the childcare provider offer solid foods instead of a bottle feeding of milk.  This can decrease this amount of milk that is needed during the day, either by substituting the feeding of solids for a milk feeding, or by decreasing the amount of milk that is needed at that feeding.      _____________    For excellent information on maintaining milk supply, see this article:     http://www.[employer].com/articles/tag/Magic+Number    _________________    [facility]:     [phone]   Angella Andujar, PNP, IBCLC  Tatyana Segovia CNP, DNP, IBCLC

## 2022-03-25 NOTE — TELEPHONE ENCOUNTER
Left a message for patient to call back to see where to send.    -Sanjana COMER [facility]  Clinic Station [place]  -[facility]  [phone] opt #2, 1, 2

## 2022-03-27 ENCOUNTER — HEALTH MAINTENANCE LETTER (OUTPATIENT)
Age: 27
End: 2022-03-27

## 2022-03-28 NOTE — TELEPHONE ENCOUNTER
Pt called back and stated to mail the forms back to the address and or fax number that is written on the forms. She states that is all the information she was given.    Lisandra Figueroa   Clinic Station    [facility]  [phone]

## 2022-03-28 NOTE — TELEPHONE ENCOUNTER
Completed paperwork was faxed to [employer].  Will keep a copy up front for a few weeks and then send it to be scanned into chart.    -Sanjana Moya  Clinic Station [place]

## 2022-04-21 ENCOUNTER — MEDICAL CORRESPONDENCE (OUTPATIENT)
Dept: HEALTH INFORMATION MANAGEMENT | Facility: CLINIC | Age: 27
End: 2022-04-21
Payer: COMMERCIAL

## 2022-04-25 ENCOUNTER — VIRTUAL VISIT (OUTPATIENT)
Dept: OBGYN | Facility: CLINIC | Age: 27
End: 2022-04-25
Payer: COMMERCIAL

## 2022-04-25 VITALS — TEMPERATURE: 98.7 F | WEIGHT: 147 LBS | BODY MASS INDEX: 25.23 KG/M2

## 2022-04-25 PROCEDURE — 99207 PR POST PARTUM EXAM: CPT | Performed by: OBSTETRICS & GYNECOLOGY

## 2022-04-25 ASSESSMENT — PATIENT HEALTH QUESTIONNAIRE - PHQ9: SUM OF ALL RESPONSES TO PHQ QUESTIONS 1-9: 1

## 2022-04-25 NOTE — PROGRESS NOTES
Jemima is a 27 year old who is being evaluated via a billable telephone visit.      What phone number would you like to be contacted at? [phone] (home) [phone] (work)     Phone call duration: 10 minutes    6 week Postpartum Visit Note    S:  Jemima Graf is here for her 6-week postpartum checkup. Does have some remaining stitches from tear at delivery. Bleeding generally resolved, occasionally has episode of light bleeding.    Delivery Date: 3/17  Delivering provider:  Nicole  Type of delivery:    Feeding Method:  Breast  Bleeding:  Resolved  Bowel/Urinary problems:  denies  Mood: denies concern  Contraception:  NFP    ================================================================  ROS: 10 point ROS neg other than the symptoms noted above in the HPI.     O:  EXAM:  Temp 98.7  F (37.1  C) (Tympanic)   Wt 66.7 kg (147 lb)   LMP 2021   Breastfeeding Yes   BMI 25.23 kg/m      Deferred, virtual visit      A:   27 year old  who is 6w s/p , doing well. Recommended that if stitches still present/bothersome in a couple of weeks to return to clinic for exam/trimming. Also discussed if irregular bleeding persists that she can call for US.    P:  Contraception: NFP  Feeding: breast    Jemima Hitchcock MD 2022 11:45 AM

## 2022-05-09 ENCOUNTER — OFFICE VISIT (OUTPATIENT)
Dept: FAMILY MEDICINE | Facility: CLINIC | Age: 27
End: 2022-05-09
Payer: COMMERCIAL

## 2022-05-09 VITALS
WEIGHT: 151 LBS | HEART RATE: 69 BPM | OXYGEN SATURATION: 98 % | SYSTOLIC BLOOD PRESSURE: 108 MMHG | DIASTOLIC BLOOD PRESSURE: 62 MMHG | RESPIRATION RATE: 18 BRPM | BODY MASS INDEX: 26.75 KG/M2 | TEMPERATURE: 97.4 F | HEIGHT: 63 IN

## 2022-05-09 DIAGNOSIS — R21 RASH AND NONSPECIFIC SKIN ERUPTION: Primary | ICD-10-CM

## 2022-05-09 LAB
ALBUMIN SERPL-MCNC: 3.9 G/DL (ref 3.4–5)
ALP SERPL-CCNC: 66 U/L (ref 40–150)
ALT SERPL W P-5'-P-CCNC: 41 U/L (ref 0–50)
ANION GAP SERPL CALCULATED.3IONS-SCNC: 5 MMOL/L (ref 3–14)
AST SERPL W P-5'-P-CCNC: 32 U/L (ref 0–45)
BILIRUB SERPL-MCNC: 0.3 MG/DL (ref 0.2–1.3)
BUN SERPL-MCNC: 11 MG/DL (ref 7–30)
CALCIUM SERPL-MCNC: 9.1 MG/DL (ref 8.5–10.1)
CHLORIDE BLD-SCNC: 105 MMOL/L (ref 94–109)
CO2 SERPL-SCNC: 27 MMOL/L (ref 20–32)
CREAT SERPL-MCNC: 0.92 MG/DL (ref 0.52–1.04)
CRP SERPL-MCNC: <2.9 MG/L (ref 0–8)
ERYTHROCYTE [DISTWIDTH] IN BLOOD BY AUTOMATED COUNT: 11.4 % (ref 10–15)
ERYTHROCYTE [SEDIMENTATION RATE] IN BLOOD BY WESTERGREN METHOD: 8 MM/HR (ref 0–20)
GFR SERPL CREATININE-BSD FRML MDRD: 88 ML/MIN/1.73M2
GLUCOSE BLD-MCNC: 100 MG/DL (ref 70–99)
HCT VFR BLD AUTO: 36.8 % (ref 35–47)
HGB BLD-MCNC: 12.6 G/DL (ref 11.7–15.7)
MCH RBC QN AUTO: 30.2 PG (ref 26.5–33)
MCHC RBC AUTO-ENTMCNC: 34.2 G/DL (ref 31.5–36.5)
MCV RBC AUTO: 88 FL (ref 78–100)
PLATELET # BLD AUTO: 246 10E3/UL (ref 150–450)
POTASSIUM BLD-SCNC: 3.8 MMOL/L (ref 3.4–5.3)
PROT SERPL-MCNC: 7.5 G/DL (ref 6.8–8.8)
RBC # BLD AUTO: 4.17 10E6/UL (ref 3.8–5.2)
SODIUM SERPL-SCNC: 137 MMOL/L (ref 133–144)
TSH SERPL DL<=0.005 MIU/L-ACNC: 0.4 MU/L (ref 0.4–4)
WBC # BLD AUTO: 6 10E3/UL (ref 4–11)

## 2022-05-09 PROCEDURE — 84443 ASSAY THYROID STIM HORMONE: CPT | Performed by: NURSE PRACTITIONER

## 2022-05-09 PROCEDURE — 85652 RBC SED RATE AUTOMATED: CPT | Performed by: NURSE PRACTITIONER

## 2022-05-09 PROCEDURE — 99213 OFFICE O/P EST LOW 20 MIN: CPT | Performed by: NURSE PRACTITIONER

## 2022-05-09 PROCEDURE — 86140 C-REACTIVE PROTEIN: CPT | Performed by: NURSE PRACTITIONER

## 2022-05-09 PROCEDURE — 80053 COMPREHEN METABOLIC PANEL: CPT | Performed by: NURSE PRACTITIONER

## 2022-05-09 PROCEDURE — 85027 COMPLETE CBC AUTOMATED: CPT | Performed by: NURSE PRACTITIONER

## 2022-05-09 PROCEDURE — 36415 COLL VENOUS BLD VENIPUNCTURE: CPT | Performed by: NURSE PRACTITIONER

## 2022-05-09 ASSESSMENT — ANXIETY QUESTIONNAIRES
1. FEELING NERVOUS, ANXIOUS, OR ON EDGE: SEVERAL DAYS
5. BEING SO RESTLESS THAT IT IS HARD TO SIT STILL: NOT AT ALL
3. WORRYING TOO MUCH ABOUT DIFFERENT THINGS: SEVERAL DAYS
6. BECOMING EASILY ANNOYED OR IRRITABLE: SEVERAL DAYS
4. TROUBLE RELAXING: NOT AT ALL
GAD7 TOTAL SCORE: 3
2. NOT BEING ABLE TO STOP OR CONTROL WORRYING: NOT AT ALL
7. FEELING AFRAID AS IF SOMETHING AWFUL MIGHT HAPPEN: NOT AT ALL

## 2022-05-09 NOTE — PATIENT INSTRUCTIONS
Labs today-we will notify you with those results    Keep a symptom journal and take pictures of rash when symptoms are present    Follow up if symptoms do not improve or worsen.

## 2022-05-09 NOTE — PROGRESS NOTES
Assessment & Plan     Rash and nonspecific skin eruption  No acute distress.  Intermittent rash of unclear etiology.  Labs completed for further evaluation which were unremarkable.  Recommend starting a Zyrtec daily and monitor symptoms.  Follow up if symptoms do not improve or worsen.  - CBC with platelets; Future  - ESR: Erythrocyte sedimentation rate; Future  - CRP, inflammation; Future  - TSH with free T4 reflex; Future  - Comprehensive metabolic panel (BMP + Alb, Alk Phos, ALT, AST, Total. Bili, TP); Future  - CBC with platelets  - ESR: Erythrocyte sedimentation rate  - CRP, inflammation  - TSH with free T4 reflex  - Comprehensive metabolic panel (BMP + Alb, Alk Phos, ALT, AST, Total. Bili, TP)      Return in about 1 week (around 5/16/2022), or if symptoms worsen or fail to improve.    [facility]    Mercy Onofre is a 27 year old who presents for the following health issues     HPI     Rash  Onset/Duration: intermittently ongoing for several months  Description  Location: starts on hands then spreads to the rest of the body   Character: blotchy, red, feels hot like a sun burn  Itching: mild  Intensity:  moderate  Progression of Symptoms:  worsening  Accompanying signs and symptoms:   Fever: no  Body aches or joint pain: no  Sore throat symptoms: no  Recent cold symptoms: no  History:           Previous episodes of similar rash: None  New exposures:  None  Recent travel: no  Exposure to similar rash: no  Precipitating or alleviating factors: none  Therapies tried and outcome: Benadryl/diphenhydramine -  usually effective    Increasing in frequency-twice in the past week, 5-6 times in the past 7 weeks  Lasting for a few hours  Starts in hands and then moves up arms  No new prescriptions or products       Review of Systems   Constitutional, HEENT, cardiovascular, pulmonary, gi and gu systems are negative, except as otherwise noted.      Objective   "  /62 (BP Location: Right arm, Patient Position: Sitting, Cuff Size: Adult Regular)   Pulse 69   Temp 97.4  F (36.3  C) (Tympanic)   Resp 18   Ht 1.6 m (5' 3\")   Wt 68.5 kg (151 lb)   LMP 06/03/2021   SpO2 98%   BMI 26.75 kg/m    Body mass index is 26.75 kg/m .  Physical Exam   GENERAL: healthy, alert and no distress  SKIN: no suspicious lesions or rashes  PSYCH: mentation appears normal, affect normal/bright    Results for orders placed or performed in visit on 05/09/22   CBC with platelets     Status: Normal   Result Value Ref Range    WBC Count 6.0 4.0 - 11.0 10e3/uL    RBC Count 4.17 3.80 - 5.20 10e6/uL    Hemoglobin 12.6 11.7 - 15.7 g/dL    Hematocrit 36.8 35.0 - 47.0 %    MCV 88 78 - 100 fL    MCH 30.2 26.5 - 33.0 pg    MCHC 34.2 31.5 - 36.5 g/dL    RDW 11.4 10.0 - 15.0 %    Platelet Count 246 150 - 450 10e3/uL   ESR: Erythrocyte sedimentation rate     Status: Normal   Result Value Ref Range    Erythrocyte Sedimentation Rate 8 0 - 20 mm/hr   CRP, inflammation     Status: Normal   Result Value Ref Range    CRP Inflammation <2.9 0.0 - 8.0 mg/L   TSH with free T4 reflex     Status: Normal   Result Value Ref Range    TSH 0.40 0.40 - 4.00 mU/L   Comprehensive metabolic panel (BMP + Alb, Alk Phos, ALT, AST, Total. Bili, TP)     Status: Abnormal   Result Value Ref Range    Sodium 137 133 - 144 mmol/L    Potassium 3.8 3.4 - 5.3 mmol/L    Chloride 105 94 - 109 mmol/L    Carbon Dioxide (CO2) 27 20 - 32 mmol/L    Anion Gap 5 3 - 14 mmol/L    Urea Nitrogen 11 7 - 30 mg/dL    Creatinine 0.92 0.52 - 1.04 mg/dL    Calcium 9.1 8.5 - 10.1 mg/dL    Glucose 100 (H) 70 - 99 mg/dL    Alkaline Phosphatase 66 40 - 150 U/L    AST 32 0 - 45 U/L    ALT 41 0 - 50 U/L    Protein Total 7.5 6.8 - 8.8 g/dL    Albumin 3.9 3.4 - 5.0 g/dL    Bilirubin Total 0.3 0.2 - 1.3 mg/dL    GFR Estimate 88 >60 mL/min/1.73m2               "

## 2022-05-10 ASSESSMENT — ANXIETY QUESTIONNAIRES: GAD7 TOTAL SCORE: 3

## 2022-05-16 ENCOUNTER — MEDICAL CORRESPONDENCE (OUTPATIENT)
Dept: HEALTH INFORMATION MANAGEMENT | Facility: CLINIC | Age: 27
End: 2022-05-16
Payer: COMMERCIAL

## 2022-06-15 ENCOUNTER — TRANSFERRED RECORDS (OUTPATIENT)
Dept: FAMILY MEDICINE | Facility: CLINIC | Age: 27
End: 2022-06-15

## 2022-06-15 ENCOUNTER — TRANSFERRED RECORDS (OUTPATIENT)
Dept: HEALTH INFORMATION MANAGEMENT | Facility: CLINIC | Age: 27
End: 2022-06-15

## 2022-06-15 LAB
ALT SERPL-CCNC: 32 LU/L (ref 0–32)
AST SERPL-CCNC: 30 LU/L (ref 0–40)
CREATININE (EXTERNAL): 0.76 MG/DL (ref 0.57–1)
GFR ESTIMATED (EXTERNAL): 111 ML/MIN/1.73
TSH SERPL-ACNC: <0.005 ULU/ML (ref 0.45–4.5)

## 2022-07-20 ENCOUNTER — MYC MEDICAL ADVICE (OUTPATIENT)
Dept: FAMILY MEDICINE | Facility: CLINIC | Age: 27
End: 2022-07-20

## 2022-07-21 ENCOUNTER — MYC MEDICAL ADVICE (OUTPATIENT)
Dept: FAMILY MEDICINE | Facility: CLINIC | Age: 27
End: 2022-07-21

## 2022-07-21 DIAGNOSIS — R94.6 THYROID FUNCTION TEST ABNORMAL: Primary | ICD-10-CM

## 2022-08-05 DIAGNOSIS — Z83.49 FAMILY HISTORY OF THYROID DISEASE: ICD-10-CM

## 2022-08-05 DIAGNOSIS — R53.83 FATIGUE: Primary | ICD-10-CM

## 2022-08-05 DIAGNOSIS — R94.5 ABNORMAL RESULTS OF LIVER FUNCTION STUDIES: ICD-10-CM

## 2022-08-10 NOTE — TELEPHONE ENCOUNTER
RECORDS RECEIVED FROM: internal /ce   DATE RECEIVED: 8/17/22    NOTES (FOR ALL VISITS) STATUS DETAILS   OFFICE NOTES from referring provider internal  Marsha Noel NP     MEDICATION LIST internal     IMAGING        MRI (BRAIN) internal  6.24.21      LABS     DIABETES: HBGA1C, CREATININE, FASTING LIPIDS, MICROALBUMIN URINE, POTASSIUM, TSH, T4    THYROID: TSH, T4, CBC, THYRODLONULIN, TOTAL T3, FREE T4, CALCITONIN, CEA internal /ce

## 2022-08-12 ENCOUNTER — MYC MEDICAL ADVICE (OUTPATIENT)
Dept: ENDOCRINOLOGY | Facility: CLINIC | Age: 27
End: 2022-08-12

## 2022-08-15 NOTE — TELEPHONE ENCOUNTER
DX, Referring NOTES: Thyroid Function Test Abnormal; referred by Marsha Noel NP    For Cancer Patients: Need the original operative and surgical pathology reports and all imaging reports/images related to the disease (includes all thyroid US, nuclear thyroid and total body scans, PET scans, chest CT reports since prior to the diagnosis ).   APPT DATE: 8/22/2022   NOTES (FOR ALL VISITS) STATUS DETAILS   OFFICE NOTES from referring provider Internal [facility]:  7/21/22 - MyChart Referral from Marsha Noel NP   OFFICE NOTES from other specialist Care Everywhere [facility]:  7/29/22 - PCC OV with REJI Denise   ED NOTES Care Everywhere [facility]:  9/11/18 - ED OV with Dr. Dallas   OPERATIVE REPORT  (thyroid, pituitary, adrenal, parathyroid)  (All op notes related to diagnoses) N/A    MEDICATION LIST Internal    IMAGING      MRI (BRAIN) Received / Internal [facility]:  6/24/21 - MRI Brain    [facility]:  11/7/18 - MRA Head  11/7/18 - MRA Brain   ULTRASOUND (HEAD/NECK)  * Include FNAs Received [facility] [place]:  8/1/22 - US Neck/Thyroid/Head   LABS     DIABETES: HBGA1C, CREATININE, FASTING LIPIDS, MICROALBUMIN URINE, POTASSIUM, TSH, T4    THYROID: TSH, T4, CBC, THYRODLONULIN, TOTAL T3, FREE T4, CALCITONIN, CEA [facility]  7/29/22 - CMP  7/29/22 - TSH, T4, T3  7/29/22 - Thyroid Peroxidase  7/9/19 - BMP    [facility]:  6/15/22 - Creatinine  5/9/22 - CBC  3/18/22 - HBGA1C  11/18/21 - Glucose  11/18/21 - Vitamin D     Records Requested  08/15/22    Facility  [facility]  Fax: [phone]  [facility]  Fax: [phone]   Outcome * 8/15/22 7:53 AM Faxed urg req to [facility] and [facility] for images to be pushed to [place] [facility]. - Anisa    * 8/16/22 11:12 AM Images received from [facility] and attached to the patient in PACs. - Anisa

## 2022-08-17 ENCOUNTER — PRE VISIT (OUTPATIENT)
Dept: ENDOCRINOLOGY | Facility: CLINIC | Age: 27
End: 2022-08-17

## 2022-08-22 ENCOUNTER — VIRTUAL VISIT (OUTPATIENT)
Dept: ENDOCRINOLOGY | Facility: CLINIC | Age: 27
End: 2022-08-22
Payer: COMMERCIAL

## 2022-08-22 ENCOUNTER — PRE VISIT (OUTPATIENT)
Dept: ENDOCRINOLOGY | Facility: CLINIC | Age: 27
End: 2022-08-22

## 2022-08-22 DIAGNOSIS — E03.8 OTHER SPECIFIED HYPOTHYROIDISM: Primary | ICD-10-CM

## 2022-08-22 PROBLEM — Z34.00 PRENATAL CARE, FIRST PREGNANCY: Status: RESOLVED | Noted: 2021-07-14 | Resolved: 2022-08-22

## 2022-08-22 PROBLEM — Z34.03 ENCOUNTER FOR SUPERVISION OF NORMAL FIRST PREGNANCY IN THIRD TRIMESTER: Status: RESOLVED | Noted: 2022-03-16 | Resolved: 2022-08-22

## 2022-08-22 PROBLEM — Z34.00 SUPERVISION OF NORMAL FIRST PREGNANCY: Status: RESOLVED | Noted: 2022-03-13 | Resolved: 2022-08-22

## 2022-08-22 PROCEDURE — 99204 OFFICE O/P NEW MOD 45 MIN: CPT | Mod: 95

## 2022-08-22 RX ORDER — LEVOTHYROXINE SODIUM 50 UG/1
50 TABLET ORAL DAILY
COMMUNITY
Start: 2022-07-29 | End: 2022-08-27 | Stop reason: DRUGHIGH

## 2022-08-22 NOTE — PROGRESS NOTES
"Endocrine Consult Video visit note-    Jemima Graf  is being evaluated via a billable video visit.      How would you like to obtain your AVS? MyChart  For the video visit, send the invitation by: Send to e-mail at: [email or web address]  Will anyone else be joining your video visit? No    Attending Assessment/Plan :     Hypothyroidism following transient thyrotoxicosis is seen in thyroiditis.  The hypothyriodism is also often transient. Differential postpartum vs subacute thyroiditis.  In her case she is describing tenderness of the \"throat\" in May which may have been tender thyroiditis which is more typical following :She has already been started on LT4 50 mcg/day. Unstable.  Current status unknown   Labs on LT4 with dose adjustment to achieve normal TFTS in anticipation of another pregnancy. In the future it can be determined if she has permanent hypothyroidism or not .  No need to avoid gluten    Addendum  Receive of labs 8/26/2022 ([facility]) TSH 4.22, free T4 0.9 - increase to LT4 75 mcg/day  10/20/22 ([facility]): TSH 0.83, free T4 1.1  6/19/23 TSh 0.59, total T4 12.6  7/20/23 baby born  at 37 weeks 3 days.   8/16 she is reporting [facility] Tsh 0.01, T4 12, glucose 120 and random glucose 200 on husbands BS meter.       Postpartum < 1 year.  The above could have been postpartum thyroiditis but this is usually painless.      Lactating mother. She hasn't had return of menses yet.     Seeking pregnancy.  Advised to avoid pregnancy while thyroid labs are off.  As above . Notify us immediately if diagnosis pregnancy.    Due to the COVID 19 pandemic this visit was a video visit in order to help prevent spread of infection in this patient and the general population. The patient gave verbal consent for the visit today. I have independently reviewed and interpreted labs, imaging as indicated.     Chart review/prep time 1  9005-0876  Visit Start time 1655  Visit Stop time  1705   42 minutes spent on the " date of the encounter doing chart review, history and exam, documentation and further activities as noted above.    Faith Leone MD    Chief complaint:  Jemima is a 27 year old female seen in consultation at the request of Dr Noel for abnormal thyroid function tests.   I have reviewed Care Everywhere including [facility] , [facility], [facility]  lab reports, imaging reports and provider notes as indicated.      HISTORY OF PRESENT ILLNESS  Jemima notes she had covid in 12/2021 during pregnancy and she delivered her baby in March.  She had hives during the pregnancy.   In May her throat hurt really bad x 2 weeks, resolved for a week and the came back, and eventually went away.   She was tested for allergies and was told her TSH was very low.  Later, she saw her PCP and the TSH was high.  She was prescribed LT4 50 mcg/day. She can't tell any difference on the lT4 beyond her concern the LT4 has given her side effects of increased lethargy, headaches, hot and cold, anxious and sometimes feeling very tingly .     Endocrine relevant labs are as follows:  8/25/2020 COVID 19 PCR negative  5/9/2022 TSH 0.4  6/15/2022 TSH < 0.005  7/29/22 TSH 13.8, free T4 < 0.5, free T4 1.9 pg/ml , TPO < 3, Ca 9.7, glucose 80  7/29/2022 levothyroxine 50 mcg/day--     Relevant imaging is as follows: (as read by me as it pertains to endocrine relevant organs)  8/1/2022 thyroid US ([facility])  Heterogeneous hypoechoic thyroid with white strands and grade 3 blood flow . No thyroid nodules.     REVIEW OF SYSTEMS  Currently a little throat pain  Voice is oK  Swallow is OK  Cardiac: negative; sometimes pounds more during high pressure   Respiratory:  Exercise tolerance not good , ? Worse than usual  GI: negative   No pains  Headaches lately daily  Lactating  No periods since the baby was born   10 system ROS otherwise as per the HPI or negative    Past Medical History  Past Medical History:   Diagnosis Date    ADHD  (attention deficit hyperactivity disorder), inattentive type     BEBO (generalized anxiety disorder)     Infection due to 2019 novel coronavirus 12/2021    Migraine     ff Neurology    Seizure-like activity (H)      Past Surgical History:   Procedure Laterality Date    FOOT SURGERY Right     ORTHOPEDIC SURGERY       Medications  Current Outpatient Medications   Medication Sig Dispense Refill    acetaminophen (TYLENOL) 325 MG tablet Take 325-650 mg by mouth every 6 hours as needed for mild pain       coenzyme Q-10 200 MG CAPS Take 1 capsule by mouth daily      Ferrous Sulfate (IRON PO)       levothyroxine (SYNTHROID/LEVOTHROID) 50 MCG tablet Take 50 mcg by mouth daily      magnesium oxide (MAG-OX) 400 (241.3 Mg) MG tablet Take 1 tablet (400 mg) by mouth 3 times daily as needed (For headaches) 50 tablet 1    Prenatal Vit-Fe Fumarate-FA (PRENATAL MULTIVITAMIN W/IRON) 27-0.8 MG tablet Take 1 tablet by mouth daily      Vitamin D3 (CHOLECALCIFEROL) 125 MCG (5000 UT) tablet Take 125 mcg by mouth daily       Allergies  Allergies   Allergen Reactions    Lisdexamfetamine Headache     Family History  family history includes Congenital Anomalies in her mother; Cystic Fibrosis in her mother; Depression in her father; Substance Abuse in her father. She was adopted.    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     Physical Exam  There is no height or weight on file to calculate BMI.   BP Readings from Last 1 Encounters:   05/09/22 108/62      Pulse Readings from Last 1 Encounters:   05/09/22 69      Resp Readings from Last 1 Encounters:   05/09/22 18      Temp Readings from Last 1 Encounters:   05/09/22 97.4  F (36.3  C) (Tympanic)      SpO2  "Readings from Last 1 Encounters:   05/09/22 98%      Wt Readings from Last 1 Encounters:   05/09/22 68.5 kg (151 lb)      Ht Readings from Last 1 Encounters:   05/09/22 1.6 m (5' 3\")     GENERAL: no distress; her  is at her side on the right.   SKIN: Visible skin clear. No significant rash, abnormal pigmentation or lesions.  EYES: Eyes grossly normal to inspection.  No discharge or erythema, or obvious scleral/conjunctival abnormalities.  NECK: visible goiter is not present; no visible neck masses  RESP: No audible wheeze, cough, or visible cyanosis.  No visible retractions or increased work of breathing.    NEURO: Awake, alert, responds appropriately to questions.  Mentation and speech fluent.  PSYCH:affect normal and appearance well-groomed.      DATA REVIEW    ENDO THYROID LABS-[facility] Latest Ref Rng & Units 6/15/2022 5/9/2022   TSH 0.40 - 4.00 mU/L  0.40   TSH (EXTERNAL) 0.450 - 4.500 ulu/ml <0.005      THYROID ULTRASOUND 08/01/2022   1102   INDICATION: Low TSH.   FINDINGS: Right lobe of the thyroid measures 5.1 cm in length and the left lobe 4.1 with an isthmic thickness of 7 mm. Thyroid is diffusely heterogeneous and shows some increased vascularity. Findings are very suggestive of thyroiditis. I do not see any well-defined focal thyroid nodules. No adenopathy seen.   IMPRESSION: Generous size heterogeneous hypervascular thyroid gland without focal nodules. Appearance is very suggestive of thyroiditis.   Dictated By: Harlan Manuel MD 8/1/2022 1:19 PM   Edited By: NEVA 8/1/2022 3:29 PM   Electronically Signed: Harlan Manuel MD 8/1/2022 3:56 PM    "

## 2022-08-22 NOTE — PATIENT INSTRUCTIONS
Get us the laboratory information so we can send orders for labs soon      OR, you can get labs in any [place] laboratory:       Information for patients on Levo-thyroxine      The thyroid hormone, Levo-thyroxine (L-T4) is one of the main hormones normally produced by the thyroid.  The drug is identical in chemical structure to the natural product.  Levothyroxine is used to treat hypothyroidism (underactive thyroid).  Sometimes it is used even when the thyroid is not underactive, to treat a goiter (enlarged thyroid) or a thyroid nodule.  For patients with a history of thyroid removal, L-T4 is used to replace the deficiency created by the surgery.    The purpose of giving L-T4 to treat hypothyroidism is to make up for the thyroid hormone previously produced by your thyroid before it failed.  Depending on the extent of your thyroid failure, you may require a higher or a lower dose of L-T4.  The goal is to make your blood level of TSH (a hormone made by the master gland, the pituitary, the gland which controls and judges the thyroid) normal.    This drug is usually well-tolerated and safe but it is important to take the proper dose for YOU.  This involves periodic measurements of TSH, usually within 1-2 months of a dose change.  You should be off biotin containing supplements for at least one week prior to thyroid blood tests.    You should alert your doctor if you have signs you are getting too much L-T4.  These include excessive nervousness, heart fluttering or skipping beats, diarrhea, or feeling too hot and/or sweaty.      There are many brand names for Levo-thyroxine (L-T4), including Synthroid, Levoxyl, Levothroid, Unithroid, Tirosint and others.  Changing from one brand name thyroid hormone product to another or to a generic product (all generics are called levothyroxine) can cause changes in your thyroid hormone balance, even if the dose stays the same.  Since generic products can come from many different  companies (such as [employer], [employer], [employer] and others), there may be less consistency among the different generic preparations.  The decision to change brands or to change to a generic product must be made with your physician or other caregiver.  Follow-up testing should be arranged to monitor for any needed adjustments.  Monitoring may need to be more frequent if you always receive a generic thyroid hormone product.    For proper thyroid hormone balance the dose of thyroid hormone in your pills must be precise and consistent.    Be sure to take the medication as prescribed on an empty stomach, and at least 4 hours apart from calcium supplements, iron and soy products.  Store the medication away from severe heat and humidity.    You should be OFF any biotin containing supplements at least 7 days prior to thyroid lab draws.       Many insurance companies and other providers charge higher co-payments for brand name medications.  Since brand name L-T4 is not very expensive, be sure to ask if the actual cost of buying the medication is less than the co-payment.  In some instances the charge for a co-payment may be greater than buying the drug outright, especially if the prescription needs to be refilled every 30 days.

## 2022-08-22 NOTE — LETTER
"8/22/2022       RE: Jemima Graf  [id number] [address]     Dear Colleague,    Thank you for referring your patient, Jemima Graf, to the [facility] ENDOCRINOLOGY [facility] [place] [facility]. Please see a copy of my visit note below.    Endocrine Consult Video visit note-    Jemima Graf  is being evaluated via a billable video visit.      How would you like to obtain your AVS? MyChart  For the video visit, send the invitation by: Send to e-mail at: [email or web address]  Will anyone else be joining your video visit? No    Attending Assessment/Plan :     Hypothyroidism following transient thyrotoxicosis is seen in thyroiditis.  The hypothyriodism is also often transient. Differential postpartum vs subacute thyroiditis.  In her case she is describing tenderness of the \"throat\" in May which may have been tender thyroiditis which is more typical following :She has already been started on LT4 50 mcg/day. Unstable.  Current status unknown   Labs on LT4 with dose adjustment to achieve normal TFTS in anticipation of another pregnancy. In the future it can be determined if she has permanent hypothyroidism or not .  No need to avoid gluten    Postpartum < 1 year.  The above could have been postpartum thyroiditis but this is usually painless.      Lactating mother. She hasn't had return of menses yet.     Seeking pregnancy.  Advised to avoid pregnancy while thyroid labs are off.  As above . Notify us immediately if diagnosis pregnancy.    Due to the COVID 19 pandemic this visit was a video visit in order to help prevent spread of infection in this patient and the general population. The patient gave verbal consent for the visit today. I have independently reviewed and interpreted labs, imaging as indicated.     Chart review/prep time 1  [id number]  Visit Start time 1655  Visit Stop time  1702   42 minutes spent on the date of " the encounter doing chart review, history and exam, documentation and further activities as noted above.    Faith Leone MD    Chief complaint:  Jemima is a 27 year old female seen in consultation at the request of Dr Noel for abnormal thyroid function tests.   I have reviewed Care Everywhere including [facility] , [facility], [facility]  lab reports, imaging reports and provider notes as indicated.      HISTORY OF PRESENT ILLNESS  Jemima notes she had covid in 12/2021 during pregnancy and she delivered her baby in March.  She had hives during the pregnancy.   In May her throat hurt really bad x 2 weeks, resolved for a week and the came back, and eventually went away.   She was tested for allergies and was told her TSH was very low.  Later, she saw her PCP and the TSH was high.  She was prescribed LT4 50 mcg/day. She can't tell any difference on the lT4 beyond her concern the LT4 has given her side effects of increased lethargy, headaches, hot and cold, anxious and sometimes feeling very tingly .     Endocrine relevant labs are as follows:  8/25/2020 COVID 19 PCR negative  5/9/2022 TSH 0.4  6/15/2022 TSH < 0.005  7/29/22 TSH 13.8, free T4 < 0.5, free T4 1.9 pg/ml , TPO < 3, Ca 9.7, glucose 80  7/29/2022 levothyroxine 50 mcg/day--     Relevant imaging is as follows: (as read by me as it pertains to endocrine relevant organs)  8/1/2022 thyroid US ([facility])  Heterogeneous hypoechoic thyroid with white strands and grade 3 blood flow . No thyroid nodules.     REVIEW OF SYSTEMS  Currently a little throat pain  Voice is oK  Swallow is OK  Cardiac: negative; sometimes pounds more during high pressure   Respiratory:  Exercise tolerance not good , ? Worse than usual  GI: negative   No pains  Headaches lately daily  Lactating  No periods since the baby was born   10 system ROS otherwise as per the HPI or negative    Past Medical History  Past Medical History:   Diagnosis Date     ADHD  (attention deficit hyperactivity disorder), inattentive type      BEBO (generalized anxiety disorder)      Infection due to 2019 novel coronavirus 12/2021     Migraine     ff Neurology     Seizure-like activity (H)      Past Surgical History:   Procedure Laterality Date     FOOT SURGERY Right      ORTHOPEDIC SURGERY       Medications  Current Outpatient Medications   Medication Sig Dispense Refill     acetaminophen (TYLENOL) 325 MG tablet Take 325-650 mg by mouth every 6 hours as needed for mild pain        coenzyme Q-10 200 MG CAPS Take 1 capsule by mouth daily       Ferrous Sulfate (IRON PO)        levothyroxine (SYNTHROID/LEVOTHROID) 50 MCG tablet Take 50 mcg by mouth daily       magnesium oxide (MAG-OX) 400 (241.3 Mg) MG tablet Take 1 tablet (400 mg) by mouth 3 times daily as needed (For headaches) 50 tablet 1     Prenatal Vit-Fe Fumarate-FA (PRENATAL MULTIVITAMIN W/IRON) 27-0.8 MG tablet Take 1 tablet by mouth daily       Vitamin D3 (CHOLECALCIFEROL) 125 MCG (5000 UT) tablet Take 125 mcg by mouth daily       Allergies  Allergies   Allergen Reactions     Lisdexamfetamine Headache     Family History  family history includes Congenital Anomalies in her mother; Cystic Fibrosis in her mother; Depression in her father; Substance Abuse in her father. She was adopted.    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     Physical Exam  There is no height or weight on file to calculate BMI.   BP Readings from Last 1 Encounters:   05/09/22 108/62      Pulse Readings from Last 1 Encounters:   05/09/22 69      Resp Readings from Last 1 Encounters:   05/09/22 18      Temp Readings from Last 1 Encounters:   05/09/22 97.4  F (36.3  C)  "(Tympanic)      SpO2 Readings from Last 1 Encounters:   05/09/22 98%      Wt Readings from Last 1 Encounters:   05/09/22 68.5 kg (151 lb)      Ht Readings from Last 1 Encounters:   05/09/22 1.6 m (5' 3\")     GENERAL: no distress; her  is at her side on the right.   SKIN: Visible skin clear. No significant rash, abnormal pigmentation or lesions.  EYES: Eyes grossly normal to inspection.  No discharge or erythema, or obvious scleral/conjunctival abnormalities.  NECK: visible goiter is not present; no visible neck masses  RESP: No audible wheeze, cough, or visible cyanosis.  No visible retractions or increased work of breathing.    NEURO: Awake, alert, responds appropriately to questions.  Mentation and speech fluent.  PSYCH:affect normal and appearance well-groomed.      DATA REVIEW    ENDO THYROID LABS-[facility] Latest Ref Rng & Units 6/15/2022 5/9/2022   TSH 0.40 - 4.00 mU/L  0.40   TSH (EXTERNAL) 0.450 - 4.500 ulu/ml <0.005      THYROID ULTRASOUND 08/01/2022   1102   INDICATION: Low TSH.   FINDINGS: Right lobe of the thyroid measures 5.1 cm in length and the left lobe 4.1 with an isthmic thickness of 7 mm. Thyroid is diffusely heterogeneous and shows some increased vascularity. Findings are very suggestive of thyroiditis. I do not see any well-defined focal thyroid nodules. No adenopathy seen.   IMPRESSION: Generous size heterogeneous hypervascular thyroid gland without focal nodules. Appearance is very suggestive of thyroiditis.   Dictated By: Harlan Manuel MD 8/1/2022 1:19 PM   Edited By: NEVA 8/1/2022 3:29 PM   Electronically Signed: Harlan Manuel MD 8/1/2022 3:56 PM        Faith Leone MD      "

## 2022-08-24 ENCOUNTER — MYC MEDICAL ADVICE (OUTPATIENT)
Dept: ENDOCRINOLOGY | Facility: CLINIC | Age: 27
End: 2022-08-24

## 2022-08-24 NOTE — LETTER
[facility] ENDOCRINOLOG[facility]IN[facility] [address] [address]  [phone]    FACSIMILE TRANSMITTAL sent 24 AUG 2022 at 11:50AM    TO:  LAB  COMPANY: [facility]  FAX NUMBER: [phone]  PHONE NUMBER:      FROM:   PHONE:   DATE: 08/24/22  NUMBER OF PAGES:     _____URGENT _____REVIEW ONLY _____PLEASE COMMENT____PLEASE REPLY    NOTES/COMMENTS: Please forward results via CareEverywhere/Fax  Attn: Dr Faith Leone     RE: Jemima Graf  Female, 27 year old, 1995  MRN: [id number]  Phone: [phone]        IF YOU DID NOT RECEIVE THE CORRECT NUMBER OF PAGES OR THE FAX DID NOT COME THROUGH CLEARLY, PLEASE CALL THE SENDER     CONFIDENTIALITY STATEMENT: Confidential information that may accompany this transmission contains protected health information under state and federal law and is legally privileged. This information is intended only for the use of the individual or entity named above and may be used only for carrying out treatment, payment or other healthcare operations. The recipient or person responsible for delivering this information is prohibited by law from disclosing this information without proper authorization to any other party, unless required to do so by law or regulation. If you are not the intended recipient, you are hereby notified that any review, dissemination, distribution, or copying of this message is strictly prohibited. If you have received this communication in error, please destroy the materials and contact us immediately by calling the number listed above. No response indicates that the information was received by the appropriate authorized party

## 2022-08-24 NOTE — TELEPHONE ENCOUNTER
Lab orders faxed to number provided. Pt notified. Copy on file.   Rosalind Wilcox RN on 8/24/2022 at 11:53 AM

## 2022-08-27 DIAGNOSIS — E03.8 OTHER SPECIFIED HYPOTHYROIDISM: Primary | ICD-10-CM

## 2022-08-27 RX ORDER — LEVOTHYROXINE SODIUM 75 UG/1
75 TABLET ORAL DAILY
Qty: 90 TABLET | Refills: 4 | Status: SHIPPED | OUTPATIENT
Start: 2022-08-27 | End: 2023-09-05

## 2022-09-18 ENCOUNTER — HEALTH MAINTENANCE LETTER (OUTPATIENT)
Age: 27
End: 2022-09-18

## 2023-01-03 DIAGNOSIS — Z33.1 PREGNANCY, INCIDENTAL: ICD-10-CM

## 2023-01-03 DIAGNOSIS — Z86.39 HISTORY OF THYROIDITIS: Primary | ICD-10-CM

## 2023-03-17 ENCOUNTER — TELEPHONE (OUTPATIENT)
Dept: ENDOCRINOLOGY | Facility: CLINIC | Age: 28
End: 2023-03-17
Payer: COMMERCIAL

## 2023-03-17 NOTE — TELEPHONE ENCOUNTER
[facility] Call Center    Phone Message    May a detailed message be left on voicemail: yes     Reason for Call: Order(s): Other:   Reason for requested: Lab orders for Dr Leone  Date needed: asap  Provider name: Dr Leone    Patient would like orders for labs currently requested sent to [facility]  Phone [phone]      Action Taken: Other: endo    Travel Screening: Not Applicable

## 2023-03-20 NOTE — TELEPHONE ENCOUNTER
Orders faxed to [phone].  Left message for patient that this was done.     Chitra Rubin on 3/20/2023 at 9:16 AM

## 2023-08-16 DIAGNOSIS — Z86.39 HISTORY OF THYROIDITIS: Primary | ICD-10-CM

## 2023-08-16 DIAGNOSIS — R73.9 HYPERGLYCEMIA: ICD-10-CM

## 2023-09-05 DIAGNOSIS — E03.8 OTHER SPECIFIED HYPOTHYROIDISM: ICD-10-CM

## 2023-09-05 RX ORDER — LEVOTHYROXINE SODIUM 75 UG/1
37.5 TABLET ORAL DAILY
Qty: 45 TABLET | Refills: 4 | Status: SHIPPED | OUTPATIENT
Start: 2023-08-16 | End: 2023-11-13 | Stop reason: DRUGHIGH

## 2023-09-06 DIAGNOSIS — Z86.39 HISTORY OF THYROIDITIS: Primary | ICD-10-CM

## 2023-10-08 ENCOUNTER — HEALTH MAINTENANCE LETTER (OUTPATIENT)
Age: 28
End: 2023-10-08

## 2023-10-18 NOTE — PLAN OF CARE
Goal Outcome Evaluation:                This writer assumes care of pt at 1515 hours.  Report received from Krys SANDOVAL RN.  Pt assessment wnl, vss.  Pt up ad krysta in room and is independent with self and  cares.  Pt denies pain.  Plan: Continue to monitor and assess, medicate prn.       Mirvaso Counseling: Mirvaso is a topical medication which can decrease superficial blood flow where applied. Side effects are uncommon and include stinging, redness and allergic reactions.

## 2023-11-13 DIAGNOSIS — R79.89 LOW TSH LEVEL: ICD-10-CM

## 2023-11-13 DIAGNOSIS — Z86.39 HISTORY OF THYROIDITIS: Primary | ICD-10-CM

## 2023-11-13 RX ORDER — LEVOTHYROXINE SODIUM 25 UG/1
25 TABLET ORAL DAILY
Qty: 90 TABLET | Refills: 1 | Status: SHIPPED | OUTPATIENT
Start: 2023-11-13

## 2023-12-20 ENCOUNTER — TELEPHONE (OUTPATIENT)
Dept: ENDOCRINOLOGY | Facility: CLINIC | Age: 28
End: 2023-12-20
Payer: COMMERCIAL

## 2024-12-01 ENCOUNTER — HEALTH MAINTENANCE LETTER (OUTPATIENT)
Age: 29
End: 2024-12-01

## 2025-01-13 ENCOUNTER — HOSPITAL ENCOUNTER (INPATIENT)
Facility: CLINIC | Age: 30
LOS: 2 days | Discharge: HOME | End: 2025-01-15
Attending: OBSTETRICS & GYNECOLOGY | Admitting: OBSTETRICS & GYNECOLOGY
Payer: COMMERCIAL

## 2025-01-13 ENCOUNTER — ANESTHESIA (OUTPATIENT)
Dept: OBGYN | Facility: CLINIC | Age: 30
End: 2025-01-13
Payer: COMMERCIAL

## 2025-01-13 ENCOUNTER — ANESTHESIA EVENT (OUTPATIENT)
Dept: OBGYN | Facility: CLINIC | Age: 30
End: 2025-01-13
Payer: COMMERCIAL

## 2025-01-13 ENCOUNTER — PRENATAL OFFICE VISIT (OUTPATIENT)
Dept: OBGYN | Facility: CLINIC | Age: 30
End: 2025-01-13
Payer: COMMERCIAL

## 2025-01-13 VITALS
TEMPERATURE: 97.2 F | BODY MASS INDEX: 30.87 KG/M2 | DIASTOLIC BLOOD PRESSURE: 80 MMHG | HEART RATE: 100 BPM | HEIGHT: 63 IN | RESPIRATION RATE: 18 BRPM | WEIGHT: 174.2 LBS | SYSTOLIC BLOOD PRESSURE: 122 MMHG

## 2025-01-13 DIAGNOSIS — B00.9 HSV-1 (HERPES SIMPLEX VIRUS 1) INFECTION: Primary | ICD-10-CM

## 2025-01-13 LAB
ABO + RH BLD: NORMAL
BLD GP AB SCN SERPL QL: NEGATIVE
ERYTHROCYTE [DISTWIDTH] IN BLOOD BY AUTOMATED COUNT: 12.3 % (ref 10–15)
HCT VFR BLD AUTO: 34.8 % (ref 35–47)
HGB BLD-MCNC: 12 G/DL (ref 11.7–15.7)
MCH RBC QN AUTO: 31 PG (ref 26.5–33)
MCHC RBC AUTO-ENTMCNC: 34.5 G/DL (ref 31.5–36.5)
MCV RBC AUTO: 90 FL (ref 78–100)
PLATELET # BLD AUTO: 129 10E3/UL (ref 150–450)
RBC # BLD AUTO: 3.87 10E6/UL (ref 3.8–5.2)
SPECIMEN EXP DATE BLD: NORMAL
T PALLIDUM AB SER QL: NONREACTIVE
TSH SERPL DL<=0.005 MIU/L-ACNC: 1.66 UIU/ML (ref 0.3–4.2)
WBC # BLD AUTO: 7.7 10E3/UL (ref 4–11)

## 2025-01-13 PROCEDURE — 3E0R3BZ INTRODUCTION OF ANESTHETIC AGENT INTO SPINAL CANAL, PERCUTANEOUS APPROACH: ICD-10-PCS | Performed by: NURSE ANESTHETIST, CERTIFIED REGISTERED

## 2025-01-13 PROCEDURE — 10907ZC DRAINAGE OF AMNIOTIC FLUID, THERAPEUTIC FROM PRODUCTS OF CONCEPTION, VIA NATURAL OR ARTIFICIAL OPENING: ICD-10-PCS | Performed by: OBSTETRICS & GYNECOLOGY

## 2025-01-13 PROCEDURE — 84443 ASSAY THYROID STIM HORMONE: CPT | Performed by: OBSTETRICS & GYNECOLOGY

## 2025-01-13 PROCEDURE — 00HU33Z INSERTION OF INFUSION DEVICE INTO SPINAL CANAL, PERCUTANEOUS APPROACH: ICD-10-PCS | Performed by: NURSE ANESTHETIST, CERTIFIED REGISTERED

## 2025-01-13 PROCEDURE — 85014 HEMATOCRIT: CPT | Performed by: OBSTETRICS & GYNECOLOGY

## 2025-01-13 PROCEDURE — 86900 BLOOD TYPING SEROLOGIC ABO: CPT | Performed by: OBSTETRICS & GYNECOLOGY

## 2025-01-13 PROCEDURE — 250N000009 HC RX 250: Performed by: NURSE ANESTHETIST, CERTIFIED REGISTERED

## 2025-01-13 PROCEDURE — 86780 TREPONEMA PALLIDUM: CPT | Performed by: OBSTETRICS & GYNECOLOGY

## 2025-01-13 PROCEDURE — 370N000003 HC ANESTHESIA WARD SERVICE: Performed by: NURSE ANESTHETIST, CERTIFIED REGISTERED

## 2025-01-13 PROCEDURE — 250N000009 HC RX 250: Performed by: OBSTETRICS & GYNECOLOGY

## 2025-01-13 PROCEDURE — 120N000001 HC R&B MED SURG/OB

## 2025-01-13 PROCEDURE — 36415 COLL VENOUS BLD VENIPUNCTURE: CPT | Performed by: OBSTETRICS & GYNECOLOGY

## 2025-01-13 PROCEDURE — 258N000003 HC RX IP 258 OP 636: Performed by: OBSTETRICS & GYNECOLOGY

## 2025-01-13 PROCEDURE — 250N000011 HC RX IP 250 OP 636: Performed by: NURSE ANESTHETIST, CERTIFIED REGISTERED

## 2025-01-13 PROCEDURE — 250N000013 HC RX MED GY IP 250 OP 250 PS 637: Performed by: OBSTETRICS & GYNECOLOGY

## 2025-01-13 PROCEDURE — 3E033VJ INTRODUCTION OF OTHER HORMONE INTO PERIPHERAL VEIN, PERCUTANEOUS APPROACH: ICD-10-PCS | Performed by: OBSTETRICS & GYNECOLOGY

## 2025-01-13 PROCEDURE — 99207 PR PRENATAL VISIT: CPT | Performed by: OBSTETRICS & GYNECOLOGY

## 2025-01-13 RX ORDER — MISOPROSTOL 200 UG/1
400 TABLET ORAL
Status: DISCONTINUED | OUTPATIENT
Start: 2025-01-13 | End: 2025-01-14 | Stop reason: HOSPADM

## 2025-01-13 RX ORDER — LIDOCAINE 40 MG/G
CREAM TOPICAL
Status: DISCONTINUED | OUTPATIENT
Start: 2025-01-13 | End: 2025-01-14 | Stop reason: HOSPADM

## 2025-01-13 RX ORDER — CITRIC ACID/SODIUM CITRATE 334-500MG
30 SOLUTION, ORAL ORAL
Status: DISCONTINUED | OUTPATIENT
Start: 2025-01-13 | End: 2025-01-14 | Stop reason: HOSPADM

## 2025-01-13 RX ORDER — ONDANSETRON 4 MG/1
4 TABLET, ORALLY DISINTEGRATING ORAL EVERY 6 HOURS PRN
Status: CANCELLED | OUTPATIENT
Start: 2025-01-13

## 2025-01-13 RX ORDER — KETOROLAC TROMETHAMINE 30 MG/ML
30 INJECTION, SOLUTION INTRAMUSCULAR; INTRAVENOUS
Status: CANCELLED | OUTPATIENT
Start: 2025-01-13

## 2025-01-13 RX ORDER — NALOXONE HYDROCHLORIDE 0.4 MG/ML
0.4 INJECTION, SOLUTION INTRAMUSCULAR; INTRAVENOUS; SUBCUTANEOUS
Status: DISCONTINUED | OUTPATIENT
Start: 2025-01-13 | End: 2025-01-14 | Stop reason: HOSPADM

## 2025-01-13 RX ORDER — ONDANSETRON 2 MG/ML
4 INJECTION INTRAMUSCULAR; INTRAVENOUS EVERY 6 HOURS PRN
Status: DISCONTINUED | OUTPATIENT
Start: 2025-01-13 | End: 2025-01-13

## 2025-01-13 RX ORDER — LOPERAMIDE HYDROCHLORIDE 2 MG/1
2 CAPSULE ORAL
Status: CANCELLED | OUTPATIENT
Start: 2025-01-13

## 2025-01-13 RX ORDER — CARBOPROST TROMETHAMINE 250 UG/ML
250 INJECTION, SOLUTION INTRAMUSCULAR
Status: CANCELLED | OUTPATIENT
Start: 2025-01-13

## 2025-01-13 RX ORDER — CITRIC ACID/SODIUM CITRATE 334-500MG
30 SOLUTION, ORAL ORAL
Status: CANCELLED | OUTPATIENT
Start: 2025-01-13

## 2025-01-13 RX ORDER — IBUPROFEN 800 MG/1
800 TABLET, FILM COATED ORAL
Status: DISCONTINUED | OUTPATIENT
Start: 2025-01-13 | End: 2025-01-15 | Stop reason: HOSPADM

## 2025-01-13 RX ORDER — OXYTOCIN 10 [USP'U]/ML
10 INJECTION, SOLUTION INTRAMUSCULAR; INTRAVENOUS
Status: DISCONTINUED | OUTPATIENT
Start: 2025-01-13 | End: 2025-01-15 | Stop reason: HOSPADM

## 2025-01-13 RX ORDER — METOCLOPRAMIDE 10 MG/1
10 TABLET ORAL EVERY 6 HOURS PRN
Status: CANCELLED | OUTPATIENT
Start: 2025-01-13

## 2025-01-13 RX ORDER — OXYTOCIN/0.9 % SODIUM CHLORIDE 30/500 ML
1-24 PLASTIC BAG, INJECTION (ML) INTRAVENOUS CONTINUOUS
Status: CANCELLED | OUTPATIENT
Start: 2025-01-13

## 2025-01-13 RX ORDER — TRANEXAMIC ACID 10 MG/ML
1 INJECTION, SOLUTION INTRAVENOUS EVERY 30 MIN PRN
Status: CANCELLED | OUTPATIENT
Start: 2025-01-13

## 2025-01-13 RX ORDER — OXYTOCIN 10 [USP'U]/ML
10 INJECTION, SOLUTION INTRAMUSCULAR; INTRAVENOUS
Status: CANCELLED | OUTPATIENT
Start: 2025-01-13

## 2025-01-13 RX ORDER — LIDOCAINE HYDROCHLORIDE AND EPINEPHRINE 15; 5 MG/ML; UG/ML
3 INJECTION, SOLUTION EPIDURAL
Status: COMPLETED | OUTPATIENT
Start: 2025-01-13 | End: 2025-01-13

## 2025-01-13 RX ORDER — METHYLERGONOVINE MALEATE 0.2 MG/ML
200 INJECTION INTRAVENOUS
Status: DISCONTINUED | OUTPATIENT
Start: 2025-01-13 | End: 2025-01-14 | Stop reason: HOSPADM

## 2025-01-13 RX ORDER — ACETAMINOPHEN 325 MG/1
650 TABLET ORAL EVERY 4 HOURS PRN
Status: DISCONTINUED | OUTPATIENT
Start: 2025-01-13 | End: 2025-01-14 | Stop reason: HOSPADM

## 2025-01-13 RX ORDER — NALOXONE HYDROCHLORIDE 0.4 MG/ML
0.2 INJECTION, SOLUTION INTRAMUSCULAR; INTRAVENOUS; SUBCUTANEOUS
Status: CANCELLED | OUTPATIENT
Start: 2025-01-13

## 2025-01-13 RX ORDER — LOPERAMIDE HYDROCHLORIDE 2 MG/1
4 CAPSULE ORAL
Status: DISCONTINUED | OUTPATIENT
Start: 2025-01-13 | End: 2025-01-14 | Stop reason: HOSPADM

## 2025-01-13 RX ORDER — LIDOCAINE 40 MG/G
CREAM TOPICAL
Status: CANCELLED | OUTPATIENT
Start: 2025-01-13

## 2025-01-13 RX ORDER — OXYTOCIN/0.9 % SODIUM CHLORIDE 30/500 ML
340 PLASTIC BAG, INJECTION (ML) INTRAVENOUS CONTINUOUS PRN
Status: CANCELLED | OUTPATIENT
Start: 2025-01-13

## 2025-01-13 RX ORDER — OXYTOCIN/0.9 % SODIUM CHLORIDE 30/500 ML
100-340 PLASTIC BAG, INJECTION (ML) INTRAVENOUS CONTINUOUS PRN
Status: DISCONTINUED | OUTPATIENT
Start: 2025-01-13 | End: 2025-01-15 | Stop reason: HOSPADM

## 2025-01-13 RX ORDER — METOCLOPRAMIDE HYDROCHLORIDE 5 MG/ML
10 INJECTION INTRAMUSCULAR; INTRAVENOUS EVERY 6 HOURS PRN
Status: DISCONTINUED | OUTPATIENT
Start: 2025-01-13 | End: 2025-01-14 | Stop reason: HOSPADM

## 2025-01-13 RX ORDER — ONDANSETRON 2 MG/ML
4 INJECTION INTRAMUSCULAR; INTRAVENOUS EVERY 6 HOURS PRN
Status: CANCELLED | OUTPATIENT
Start: 2025-01-13

## 2025-01-13 RX ORDER — SODIUM CHLORIDE, SODIUM LACTATE, POTASSIUM CHLORIDE, CALCIUM CHLORIDE 600; 310; 30; 20 MG/100ML; MG/100ML; MG/100ML; MG/100ML
INJECTION, SOLUTION INTRAVENOUS CONTINUOUS
Status: DISCONTINUED | OUTPATIENT
Start: 2025-01-13 | End: 2025-01-14 | Stop reason: HOSPADM

## 2025-01-13 RX ORDER — FENTANYL CITRATE-0.9 % NACL/PF 10 MCG/ML
100 PLASTIC BAG, INJECTION (ML) INTRAVENOUS EVERY 5 MIN PRN
Status: DISCONTINUED | OUTPATIENT
Start: 2025-01-13 | End: 2025-01-15 | Stop reason: HOSPADM

## 2025-01-13 RX ORDER — LOPERAMIDE HYDROCHLORIDE 2 MG/1
2 CAPSULE ORAL
Status: DISCONTINUED | OUTPATIENT
Start: 2025-01-13 | End: 2025-01-14 | Stop reason: HOSPADM

## 2025-01-13 RX ORDER — MISOPROSTOL 200 UG/1
400 TABLET ORAL
Status: CANCELLED | OUTPATIENT
Start: 2025-01-13

## 2025-01-13 RX ORDER — TRANEXAMIC ACID 10 MG/ML
1 INJECTION, SOLUTION INTRAVENOUS EVERY 30 MIN PRN
Status: DISCONTINUED | OUTPATIENT
Start: 2025-01-13 | End: 2025-01-14 | Stop reason: HOSPADM

## 2025-01-13 RX ORDER — OXYTOCIN/0.9 % SODIUM CHLORIDE 30/500 ML
1-24 PLASTIC BAG, INJECTION (ML) INTRAVENOUS CONTINUOUS
Status: DISCONTINUED | OUTPATIENT
Start: 2025-01-13 | End: 2025-01-14 | Stop reason: HOSPADM

## 2025-01-13 RX ORDER — SODIUM CHLORIDE, SODIUM LACTATE, POTASSIUM CHLORIDE, CALCIUM CHLORIDE 600; 310; 30; 20 MG/100ML; MG/100ML; MG/100ML; MG/100ML
INJECTION, SOLUTION INTRAVENOUS CONTINUOUS
Status: CANCELLED | OUTPATIENT
Start: 2025-01-13

## 2025-01-13 RX ORDER — METOCLOPRAMIDE HYDROCHLORIDE 5 MG/ML
10 INJECTION INTRAMUSCULAR; INTRAVENOUS EVERY 6 HOURS PRN
Status: CANCELLED | OUTPATIENT
Start: 2025-01-13

## 2025-01-13 RX ORDER — LOPERAMIDE HYDROCHLORIDE 2 MG/1
4 CAPSULE ORAL
Status: CANCELLED | OUTPATIENT
Start: 2025-01-13

## 2025-01-13 RX ORDER — ONDANSETRON 2 MG/ML
4 INJECTION INTRAMUSCULAR; INTRAVENOUS EVERY 6 HOURS PRN
Status: DISCONTINUED | OUTPATIENT
Start: 2025-01-13 | End: 2025-01-14 | Stop reason: HOSPADM

## 2025-01-13 RX ORDER — KETOROLAC TROMETHAMINE 30 MG/ML
30 INJECTION, SOLUTION INTRAMUSCULAR; INTRAVENOUS
Status: DISCONTINUED | OUTPATIENT
Start: 2025-01-13 | End: 2025-01-15 | Stop reason: HOSPADM

## 2025-01-13 RX ORDER — MISOPROSTOL 200 UG/1
800 TABLET ORAL
Status: CANCELLED | OUTPATIENT
Start: 2025-01-13

## 2025-01-13 RX ORDER — NALOXONE HYDROCHLORIDE 0.4 MG/ML
0.4 INJECTION, SOLUTION INTRAMUSCULAR; INTRAVENOUS; SUBCUTANEOUS
Status: CANCELLED | OUTPATIENT
Start: 2025-01-13

## 2025-01-13 RX ORDER — ONDANSETRON 4 MG/1
4 TABLET, ORALLY DISINTEGRATING ORAL EVERY 6 HOURS PRN
Status: DISCONTINUED | OUTPATIENT
Start: 2025-01-13 | End: 2025-01-14 | Stop reason: HOSPADM

## 2025-01-13 RX ORDER — VALACYCLOVIR HYDROCHLORIDE 500 MG/1
500 TABLET, FILM COATED ORAL EVERY 12 HOURS SCHEDULED
Status: DISCONTINUED | OUTPATIENT
Start: 2025-01-13 | End: 2025-01-15 | Stop reason: HOSPADM

## 2025-01-13 RX ORDER — FENTANYL CITRATE 50 UG/ML
50 INJECTION, SOLUTION INTRAMUSCULAR; INTRAVENOUS EVERY 30 MIN PRN
Status: DISCONTINUED | OUTPATIENT
Start: 2025-01-13 | End: 2025-01-14 | Stop reason: HOSPADM

## 2025-01-13 RX ORDER — SODIUM CHLORIDE, SODIUM LACTATE, POTASSIUM CHLORIDE, CALCIUM CHLORIDE 600; 310; 30; 20 MG/100ML; MG/100ML; MG/100ML; MG/100ML
INJECTION, SOLUTION INTRAVENOUS CONTINUOUS PRN
Status: DISCONTINUED | OUTPATIENT
Start: 2025-01-13 | End: 2025-01-14 | Stop reason: HOSPADM

## 2025-01-13 RX ORDER — NALOXONE HYDROCHLORIDE 0.4 MG/ML
0.2 INJECTION, SOLUTION INTRAMUSCULAR; INTRAVENOUS; SUBCUTANEOUS
Status: DISCONTINUED | OUTPATIENT
Start: 2025-01-13 | End: 2025-01-14 | Stop reason: HOSPADM

## 2025-01-13 RX ORDER — NALBUPHINE HYDROCHLORIDE 10 MG/ML
2.5-5 INJECTION INTRAMUSCULAR; INTRAVENOUS; SUBCUTANEOUS EVERY 6 HOURS PRN
Status: DISCONTINUED | OUTPATIENT
Start: 2025-01-13 | End: 2025-01-15 | Stop reason: HOSPADM

## 2025-01-13 RX ORDER — CARBOPROST TROMETHAMINE 250 UG/ML
250 INJECTION, SOLUTION INTRAMUSCULAR
Status: DISCONTINUED | OUTPATIENT
Start: 2025-01-13 | End: 2025-01-14 | Stop reason: HOSPADM

## 2025-01-13 RX ORDER — OXYTOCIN 10 [USP'U]/ML
10 INJECTION, SOLUTION INTRAMUSCULAR; INTRAVENOUS
Status: DISCONTINUED | OUTPATIENT
Start: 2025-01-13 | End: 2025-01-14 | Stop reason: HOSPADM

## 2025-01-13 RX ORDER — SODIUM CHLORIDE, SODIUM LACTATE, POTASSIUM CHLORIDE, CALCIUM CHLORIDE 600; 310; 30; 20 MG/100ML; MG/100ML; MG/100ML; MG/100ML
INJECTION, SOLUTION INTRAVENOUS CONTINUOUS PRN
Status: CANCELLED | OUTPATIENT
Start: 2025-01-13

## 2025-01-13 RX ORDER — PROCHLORPERAZINE MALEATE 5 MG/1
10 TABLET ORAL EVERY 6 HOURS PRN
Status: DISCONTINUED | OUTPATIENT
Start: 2025-01-13 | End: 2025-01-14 | Stop reason: HOSPADM

## 2025-01-13 RX ORDER — METHYLERGONOVINE MALEATE 0.2 MG/ML
200 INJECTION INTRAVENOUS
Status: CANCELLED | OUTPATIENT
Start: 2025-01-13

## 2025-01-13 RX ORDER — FENTANYL CITRATE-0.9 % NACL/PF 10 MCG/ML
100 PLASTIC BAG, INJECTION (ML) INTRAVENOUS EVERY 5 MIN PRN
Status: COMPLETED | OUTPATIENT
Start: 2025-01-13 | End: 2025-01-13

## 2025-01-13 RX ORDER — ACETAMINOPHEN 325 MG/1
650 TABLET ORAL EVERY 4 HOURS PRN
Status: CANCELLED | OUTPATIENT
Start: 2025-01-13

## 2025-01-13 RX ORDER — PROCHLORPERAZINE MALEATE 10 MG
10 TABLET ORAL EVERY 6 HOURS PRN
Status: CANCELLED | OUTPATIENT
Start: 2025-01-13

## 2025-01-13 RX ORDER — MISOPROSTOL 200 UG/1
800 TABLET ORAL
Status: DISCONTINUED | OUTPATIENT
Start: 2025-01-13 | End: 2025-01-14 | Stop reason: HOSPADM

## 2025-01-13 RX ORDER — OXYTOCIN/0.9 % SODIUM CHLORIDE 30/500 ML
100-340 PLASTIC BAG, INJECTION (ML) INTRAVENOUS CONTINUOUS PRN
Status: CANCELLED | OUTPATIENT
Start: 2025-01-13

## 2025-01-13 RX ORDER — EPHEDRINE SULFATE 50 MG/ML
5 INJECTION, SOLUTION INTRAMUSCULAR; INTRAVENOUS; SUBCUTANEOUS
Status: DISCONTINUED | OUTPATIENT
Start: 2025-01-13 | End: 2025-01-14 | Stop reason: HOSPADM

## 2025-01-13 RX ORDER — FENTANYL CITRATE 50 UG/ML
50 INJECTION, SOLUTION INTRAMUSCULAR; INTRAVENOUS EVERY 30 MIN PRN
Status: CANCELLED | OUTPATIENT
Start: 2025-01-13

## 2025-01-13 RX ORDER — METOCLOPRAMIDE 10 MG/1
10 TABLET ORAL EVERY 6 HOURS PRN
Status: DISCONTINUED | OUTPATIENT
Start: 2025-01-13 | End: 2025-01-14 | Stop reason: HOSPADM

## 2025-01-13 RX ORDER — IBUPROFEN 200 MG
800 TABLET ORAL
Status: CANCELLED | OUTPATIENT
Start: 2025-01-13

## 2025-01-13 RX ORDER — VALACYCLOVIR HYDROCHLORIDE 500 MG/1
500 TABLET, FILM COATED ORAL 2 TIMES DAILY
Qty: 30 TABLET | Refills: 2 | Status: SHIPPED | OUTPATIENT
Start: 2025-01-13

## 2025-01-13 RX ORDER — OXYTOCIN/0.9 % SODIUM CHLORIDE 30/500 ML
340 PLASTIC BAG, INJECTION (ML) INTRAVENOUS CONTINUOUS PRN
Status: DISCONTINUED | OUTPATIENT
Start: 2025-01-13 | End: 2025-01-14 | Stop reason: HOSPADM

## 2025-01-13 RX ORDER — ONDANSETRON 4 MG/1
4 TABLET, ORALLY DISINTEGRATING ORAL EVERY 6 HOURS PRN
Status: DISCONTINUED | OUTPATIENT
Start: 2025-01-13 | End: 2025-01-13

## 2025-01-13 RX ADMIN — Medication 2 MILLI-UNITS/MIN: at 13:44

## 2025-01-13 RX ADMIN — EPHEDRINE SULFATE 5 MG: 5 INJECTION INTRAVENOUS at 22:34

## 2025-01-13 RX ADMIN — Medication 100 MCG: at 19:16

## 2025-01-13 RX ADMIN — Medication 100 MCG: at 21:45

## 2025-01-13 RX ADMIN — Medication: at 17:04

## 2025-01-13 RX ADMIN — EPHEDRINE SULFATE 5 MG: 5 INJECTION INTRAVENOUS at 22:30

## 2025-01-13 RX ADMIN — Medication 100 MCG: at 19:02

## 2025-01-13 RX ADMIN — SODIUM CHLORIDE, POTASSIUM CHLORIDE, SODIUM LACTATE AND CALCIUM CHLORIDE 1000 ML: 600; 310; 30; 20 INJECTION, SOLUTION INTRAVENOUS at 16:49

## 2025-01-13 RX ADMIN — VALACYCLOVIR 500 MG: 500 TABLET, FILM COATED ORAL at 14:30

## 2025-01-13 RX ADMIN — Medication 100 MCG: at 19:10

## 2025-01-13 RX ADMIN — LIDOCAINE HYDROCHLORIDE AND EPINEPHRINE 2 ML: 15; 5 INJECTION, SOLUTION EPIDURAL at 17:04

## 2025-01-13 RX ADMIN — EPHEDRINE SULFATE 5 MG: 5 INJECTION INTRAVENOUS at 22:19

## 2025-01-13 RX ADMIN — SODIUM CHLORIDE, POTASSIUM CHLORIDE, SODIUM LACTATE AND CALCIUM CHLORIDE: 600; 310; 30; 20 INJECTION, SOLUTION INTRAVENOUS at 21:22

## 2025-01-13 RX ADMIN — LIDOCAINE HYDROCHLORIDE AND EPINEPHRINE 3 ML: 15; 5 INJECTION, SOLUTION EPIDURAL at 17:02

## 2025-01-13 RX ADMIN — SODIUM CHLORIDE, POTASSIUM CHLORIDE, SODIUM LACTATE AND CALCIUM CHLORIDE: 600; 310; 30; 20 INJECTION, SOLUTION INTRAVENOUS at 19:19

## 2025-01-13 ASSESSMENT — EDINBURGH POSTNATAL DEPRESSION SCALE (EPDS)
I HAVE BEEN ABLE TO LAUGH AND SEE THE FUNNY SIDE OF THINGS: AS MUCH AS I ALWAYS COULD
I HAVE BEEN ANXIOUS OR WORRIED FOR NO GOOD REASON: NO, NOT AT ALL
I HAVE BEEN SO UNHAPPY THAT I HAVE BEEN CRYING: NO, NEVER
THINGS HAVE BEEN GETTING ON TOP OF ME: NO, I HAVE BEEN COPING AS WELL AS EVER
I HAVE BEEN SO UNHAPPY THAT I HAVE HAD DIFFICULTY SLEEPING: NOT AT ALL
I HAVE BEEN SO UNHAPPY THAT I HAVE HAD DIFFICULTY SLEEPING: NOT AT ALL
I HAVE BEEN SO UNHAPPY THAT I HAVE BEEN CRYING: NO, NEVER
I HAVE FELT SAD OR MISERABLE: NO, NOT AT ALL
I HAVE LOOKED FORWARD WITH ENJOYMENT TO THINGS: AS MUCH AS I EVER DID
I HAVE FELT SCARED OR PANICKY FOR NO GOOD REASON: NO, NOT AT ALL
I HAVE BLAMED MYSELF UNNECESSARILY WHEN THINGS WENT WRONG: NOT VERY OFTEN
I HAVE BLAMED MYSELF UNNECESSARILY WHEN THINGS WENT WRONG: NOT VERY OFTEN
I HAVE BEEN ABLE TO LAUGH AND SEE THE FUNNY SIDE OF THINGS: AS MUCH AS I ALWAYS COULD
I HAVE FELT SCARED OR PANICKY FOR NO GOOD REASON: NO, NOT AT ALL
THINGS HAVE BEEN GETTING ON TOP OF ME: NO, I HAVE BEEN COPING AS WELL AS EVER
THE THOUGHT OF HARMING MYSELF HAS OCCURRED TO ME: NEVER
THE THOUGHT OF HARMING MYSELF HAS OCCURRED TO ME: NEVER
I HAVE FELT SAD OR MISERABLE: NO, NOT AT ALL
I HAVE BEEN ANXIOUS OR WORRIED FOR NO GOOD REASON: NO, NOT AT ALL
I HAVE LOOKED FORWARD WITH ENJOYMENT TO THINGS: AS MUCH AS I EVER DID
TOTAL SCORE: 1

## 2025-01-13 ASSESSMENT — ACTIVITIES OF DAILY LIVING (ADL)
ADLS_ACUITY_SCORE: 18
ADLS_ACUITY_SCORE: 44
ADLS_ACUITY_SCORE: 18

## 2025-01-13 NOTE — H&P
Admit H&P      HPI:  Jemima Graf, 29 year old,  at 40w1d presents for transfer of care.  She would like to schedule an induction.  She is getting really uncomfortable at this point.  She is feeling some movement, but it is decreased.  This pregnancy has been uncomplicated.  She has a history of oral cold sores and feels she might be starting to get a cold sore around her mouth.  She has never had a vaginal sore.  She and her , Gavin, live 2 hours away.  She would like to be induced as soon as she can.  Denies contractions, bleeding or leakage of fluid.   No headaches, visual disturbance or RUQ pain      Past Medical History:   Diagnosis Date    ADHD (attention deficit hyperactivity disorder), inattentive type     BEBO (generalized anxiety disorder)     Hypothyroidism     Infection due to 2019 novel coronavirus 2021    Migraine     ff Neurology    Seizure-like activity (H)      Patient Active Problem List   Diagnosis    ADHD (attention deficit hyperactivity disorder), inattentive type    Generalized anxiety disorder    Migraine without aura and without status migrainosus, not intractable    Overweight    Adopted     (normal spontaneous vaginal delivery)     Past Surgical History:   Procedure Laterality Date    FOOT SURGERY Right     ORTHOPEDIC SURGERY       Social History     Socioeconomic History    Marital status:      Spouse name: Not on file    Number of children: Not on file    Years of education: Not on file    Highest education level: Not on file   Occupational History    Occupation:    Tobacco Use    Smoking status: Never     Passive exposure: Never    Smokeless tobacco: Never   Vaping Use    Vaping status: Never Used   Substance and Sexual Activity    Alcohol use: Never    Drug use: Never    Sexual activity: Yes     Partners: Male   Other Topics Concern    Parent/sibling w/ CABG, MI or angioplasty before 65F 55M? No   Social History Narrative    Not on file      Social Drivers of Health     Financial Resource Strain: Low Risk  (6/19/2024)    [facility]    Overall Financial Resource Strain (CARDIA)     Difficulty of Paying Living Expenses: Not hard at all   Food Insecurity: No Food Insecurity (6/19/2024)    Received from [facility] [employer] [facility]    Hunger Vital Sign     Worried About Running Out of Food in the Last Year: Never true     Ran Out of Food in the Last Year: Never true   Transportation Needs: No Transportation Needs (6/19/2024)    Received from [facility] [employer] [facility]    Transportation Needs     In the past 12 months, has lack of transportation kept you from medical appointments, meetings, work, or from getting medicines or things needed for daily living?: No   Physical Activity: Insufficiently Active (6/19/2024)    Received from [facility] [employer] [facility]    Exercise Vital Sign     Days of Exercise per Week: 2 days     Minutes of Exercise per Session: 30 min   Stress: No Stress Concern Present (6/19/2024)    Received from [facility] [employer] [facility]    [employer] [place] - Occupational Stress Questionnaire     Feeling of Stress : Only a little   Social Connections: Socially Integrated (6/19/2024)    Received from [facility] [employer] [facility]    Social Connection and Isolation Panel [NHANES]     Frequency of Communication with Friends and Family: More than three times a week     Frequency of Social Gatherings with Friends and Family: Twice a week     Attends Worship Services: More than 4 times per year     Active Member of Clubs or Organizations: Yes     Attends Club or Organization Meetings: More than 4 times per year     Marital Status:    Interpersonal Safety: Not At Risk (12/2/2024)    Received from [facility] [employer] [facility]    Intimate Partner Violence     Are you in a relationship where you are physically hurt, threatened and/or made to feel afraid?:  "No   Housing Stability: Low Risk  (2024)    [facility]    Housing Stability Vital Sign     Unable to Pay for Housing in the Last Year: No     Number of Times Moved in the Last Year: 0     Homeless in the Last Year: No     Family History   Adopted: Yes   Problem Relation Age of Onset    Congenital Anomalies Mother         born partial deaf    Cystic Fibrosis Mother     Substance Abuse Father         A&D    Depression Father         No current outpatient medications on file.        Allergies:     Allergies   Allergen Reactions    Lisdexamfetamine Headache        ROS:  See HPI      Physical Exam:  /80 (BP Location: Right arm, Patient Position: Chair, Cuff Size: Adult Regular)   Pulse 100   Temp 97.2  F (36.2  C) (Tympanic)   Resp 18   Ht 1.6 m (5' 3\")   Wt 79 kg (174 lb 3.2 oz)   Breastfeeding No   BMI 30.86 kg/m       General appearance: well-hydrated, A&O x 3, no apparent distress  Neck: Appropriate symmetry, thyroid not enlarged  Lungs: Equal expansion bilaterally, no accessory muscle use  Heart: No heaves or thrills. No peripheral varicosities  Skin: No rash, lesions, ulcers  Constitutional: See vitals  Abdomen: Soft, non-tender, gravid  Extremities: neg edema, no calf tenderness  Neuro: CN II-XII grossly intact  SVE: 3/50/-2        Assessment/Plan:  29 year old  at 40w1d   I reviewed her prenatal care records.  Her due date was assigned by a 7 week ultrasound placing her 39w4d.  They have a known date of conception, written on the calendar, of 2024.  This is consistent with the ultrasound, but places her at 40w1 day, which is within the range of error for ultrasound under 9 weeks.  Will use her date of conception.  Her first trimester and third trimester labs were normal including Rh+, RI and GBS neg.  She has had the flu shot, Tdap and RSV this pregnancy.  Declined covid vaccine.    She had normal anatomy ultrasound and NIPT.  We discussed " induction of labor with pitocin.  She would like to proceed as soon as she can.  She was transferred to L&D today.  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  Will start Valtrex as she felt she might be getting a cold sore soon.        Jazmine Aponte MD on 1/13/2025 at 12:20 PM

## 2025-01-13 NOTE — ANESTHESIA PREPROCEDURE EVALUATION
Anesthesia Pre-Procedure Evaluation    Patient: Jemima Graf   MRN: [id number] : 1995        Procedure :           Past Medical History:   Diagnosis Date    ADHD (attention deficit hyperactivity disorder), inattentive type     BEBO (generalized anxiety disorder)     Hypothyroidism     Infection due to 2019 novel coronavirus 2021    Migraine     ff Neurology    Seizure-like activity (H)       Past Surgical History:   Procedure Laterality Date    FOOT SURGERY Right     ORTHOPEDIC SURGERY        Allergies   Allergen Reactions    Lisdexamfetamine Headache      Social History     Tobacco Use    Smoking status: Never     Passive exposure: Never    Smokeless tobacco: Never   Substance Use Topics    Alcohol use: Never      Wt Readings from Last 1 Encounters:   25 79 kg (174 lb 3.2 oz)        Anesthesia Evaluation   Pt has had prior anesthetic. Type: OB Labor Epidural.        ROS/MED HX  ENT/Pulmonary:       Neurologic: Comment: Migraines        Cardiovascular:       METS/Exercise Tolerance:     Hematologic:       Musculoskeletal:       GI/Hepatic:       Renal/Genitourinary:       Endo:     (+)          thyroid problem, hypothyroidism,           Psychiatric/Substance Use: Comment: ADHD    (+) psychiatric history anxiety       Infectious Disease:       Malignancy:       Other:            Physical Exam    Airway             Respiratory Devices and Support         Dental  no notable dental history         Cardiovascular   cardiovascular exam normal          Pulmonary   pulmonary exam normal                OUTSIDE LABS:  CBC:   Lab Results   Component Value Date    WBC 7.7 2025    WBC 6.0 2022    HGB 12.0 2025    HGB 12.6 2022    HCT 34.8 (L) 2025    HCT 36.8 2022     (L) 2025     2022     BMP:   Lab Results   Component Value Date     2022     2022    POTASSIUM 3.8 2022    POTASSIUM 3.9 2022    CHLORIDE 105  "05/09/2022    CHLORIDE 105 01/02/2022    CO2 27 05/09/2022    CO2 26 01/02/2022    BUN 11 05/09/2022    BUN 5 (L) 01/02/2022    CR 0.92 05/09/2022    CR 0.71 01/02/2022     (H) 05/09/2022    GLC 73 01/02/2022     COAGS: No results found for: \"PTT\", \"INR\", \"FIBR\"  POC:   Lab Results   Component Value Date    HCG Positive (A) 07/06/2021     HEPATIC:   Lab Results   Component Value Date    ALBUMIN 3.9 05/09/2022    PROTTOTAL 7.5 05/09/2022    ALT 41 05/09/2022    AST 32 05/09/2022    ALKPHOS 66 05/09/2022    BILITOTAL 0.3 05/09/2022     OTHER:   Lab Results   Component Value Date    WILLIAM 9.1 05/09/2022    TSH 1.66 01/13/2025    CRP <2.9 05/09/2022    SED 8 05/09/2022       Anesthesia Plan    ASA Status:  3       Anesthesia Type: Epidural.              Consents    Anesthesia Plan(s) and associated risks, benefits, and realistic alternatives discussed. Questions answered and patient/representative(s) expressed understanding.     - Discussed:     - Discussed with:  Patient            Postoperative Care            Comments:               HERMES Campos CRNA    I have reviewed the pertinent notes and labs in the chart from the past 30 days and (re)examined the patient.  Any updates or changes from those notes are reflected in this note.             # Thrombocytopenia: Lowest platelets = 129 in last 2 days, will monitor for bleeding                       "

## 2025-01-13 NOTE — ANESTHESIA PROCEDURE NOTES
"Epidural catheter Procedure Note    Pre-Procedure   Staff -        CRNA: Dev Quiroz APRN CRNA       Other Anesthesia Staff: Atilio Wilcox       Performed By: CRNA and SANTANA       Location: OB       Procedure Start/Stop Times: 1/13/2025 3:15 PM and 1/13/2025 3:40 PM       Pre-Anesthestic Checklist: patient identified, IV checked, risks and benefits discussed, informed consent, monitors and equipment checked, pre-op evaluation, at physician/surgeon's request and post-op pain management  Timeout:       Correct Patient: Yes        Correct Procedure: Yes        Correct Site: Yes        Correct Position: Yes   Procedure Documentation  Procedure: epidural catheter         Patient Position: sitting       Skin prep: Betadine       Local skin infiltrated with 3 mL of 2% lidocaine.        Insertion Site: L3-4. (midline approach).       Technique: LORT saline        SETH at 5.5 cm.       Needle Type: Touhy needle       Needle Gauge: 17.        Needle Length (Inches): 3.5        Catheter: 19 G.          Catheter threaded easily.           Threaded 11 cm at skin.         # of attempts: 2 and  # of redirects:  2    Assessment/Narrative         Paresthesias: No.       Insertion/Infusion Method: LORT saline    Medication(s) Administered   Medication Administration Time: 1/13/2025 3:15 PM      FOR [facility] ONLY:   Pain Team Contact information: please page the Pain Team Via [employer]. Search \"Pain\". During daytime hours, please page the attending first. At night please page the resident first.      "

## 2025-01-13 NOTE — NURSING NOTE
"Initial /80 (BP Location: Right arm, Patient Position: Chair, Cuff Size: Adult Regular)   Pulse 100   Temp 97.2  F (36.2  C) (Tympanic)   Resp 18   Ht 1.6 m (5' 3\")   Wt 79 kg (174 lb 3.2 oz)   Breastfeeding No   BMI 30.86 kg/m   Estimated body mass index is 30.86 kg/m  as calculated from the following:    Height as of this encounter: 1.6 m (5' 3\").    Weight as of this encounter: 79 kg (174 lb 3.2 oz). .  Nallely Nash CMA    "

## 2025-01-13 NOTE — PROGRESS NOTES
1230-Admission completed and pt placed in the monitor.   1344-Pt started on 2 units of Pitocin   1430-Pit increased to 4 units  1500-Epidural placed,but not dosed at this time.   1511-Pit increased to 6 units   1548-Pit increased to 8 units   1637-Pit increased to 10 units  1638- Pt requested epidural to be dosed  1702-test dose of epidural administered   1808-AROM by Dr. Leon  1837- Variable noted and patient repositioned to right side with peanut ball   1902-Phenylepherine iv 1ml administered per order for a symptomatic blood pressure  1905-deep variable noted and patient immediately placed on left side. HR returned to baseline. Provider updated face to face.  1910-2nd dose of phenylephrine administered for symptomatic low BP  1916-3rd dose of phenylephrine administered for low BP  1920-bedside handoff completed with RUSTAM Malagon RN on 1/13/2025 at 7:20 PM

## 2025-01-13 NOTE — PROGRESS NOTES
Transfer of care      HPI:  Jemima Graf, 29 year old,  at 40w1d presents for transfer of care.  She would like to schedule an induction.  She is getting really uncomfortable at this point.  She is feeling some movement, but it is decreased.  This pregnancy has been uncomplicated.  She has a history of oral cold sores and feels she might be starting to get a cold sore around her mouth.  She has never had a vaginal sore.  She and her , Gavin, live 2 hours away.  She would like to be induced as soon as she can.  Denies contractions, bleeding or leakage of fluid.   No headaches, visual disturbance or RUQ pain      Past Medical History:   Diagnosis Date    ADHD (attention deficit hyperactivity disorder), inattentive type     BEBO (generalized anxiety disorder)     Hypothyroidism     Infection due to 2019 novel coronavirus 2021    Migraine     ff Neurology    Seizure-like activity (H)      Patient Active Problem List   Diagnosis    ADHD (attention deficit hyperactivity disorder), inattentive type    Generalized anxiety disorder    Migraine without aura and without status migrainosus, not intractable    Overweight    Adopted     (normal spontaneous vaginal delivery)     Past Surgical History:   Procedure Laterality Date    FOOT SURGERY Right     ORTHOPEDIC SURGERY       Social History     Socioeconomic History    Marital status:      Spouse name: Not on file    Number of children: Not on file    Years of education: Not on file    Highest education level: Not on file   Occupational History    Occupation:    Tobacco Use    Smoking status: Never     Passive exposure: Never    Smokeless tobacco: Never   Vaping Use    Vaping status: Never Used   Substance and Sexual Activity    Alcohol use: Never    Drug use: Never    Sexual activity: Yes     Partners: Male   Other Topics Concern    Parent/sibling w/ CABG, MI or angioplasty before 65F 55M? No   Social History Narrative     Not on file     Social Drivers of Health     Financial Resource Strain: Low Risk  (6/19/2024)    [facility]    Overall Financial Resource Strain (CARDIA)     Difficulty of Paying Living Expenses: Not hard at all   Food Insecurity: No Food Insecurity (6/19/2024)    Received from [facility] [employer] [facility]    Hunger Vital Sign     Worried About Running Out of Food in the Last Year: Never true     Ran Out of Food in the Last Year: Never true   Transportation Needs: No Transportation Needs (6/19/2024)    Received from [facility] [facility]    Transportation Needs     In the past 12 months, has lack of transportation kept you from medical appointments, meetings, work, or from getting medicines or things needed for daily living?: No   Physical Activity: Insufficiently Active (6/19/2024)    Received from [facility] [employer] [facility]    Exercise Vital Sign     Days of Exercise per Week: 2 days     Minutes of Exercise per Session: 30 min   Stress: No Stress Concern Present (6/19/2024)    Received from [facility] [facility]    [employer] [employer] - Occupational Stress Questionnaire     Feeling of Stress : Only a little   Social Connections: Socially Integrated (6/19/2024)    Received from [facility] [employer] [facility]    Social Connection and Isolation Panel [NHANES]     Frequency of Communication with Friends and Family: More than three times a week     Frequency of Social Gatherings with Friends and Family: Twice a week     Attends Muslim Services: More than 4 times per year     Active Member of Clubs or Organizations: Yes     Attends Club or Organization Meetings: More than 4 times per year     Marital Status:    Interpersonal Safety: Not At Risk (12/2/2024)    Received from [facility] [facility]    Intimate Partner Violence     Are you in a relationship where you are physically hurt, threatened and/or made to  "feel afraid?: No   Housing Stability: Low Risk  (2024)    [facility]    Housing Stability Vital Sign     Unable to Pay for Housing in the Last Year: No     Number of Times Moved in the Last Year: 0     Homeless in the Last Year: No     Family History   Adopted: Yes   Problem Relation Age of Onset    Congenital Anomalies Mother         born partial deaf    Cystic Fibrosis Mother     Substance Abuse Father         A&D    Depression Father         No current outpatient medications on file.        Allergies:     Allergies   Allergen Reactions    Lisdexamfetamine Headache        ROS:  See HPI      Physical Exam:  /80 (BP Location: Right arm, Patient Position: Chair, Cuff Size: Adult Regular)   Pulse 100   Temp 97.2  F (36.2  C) (Tympanic)   Resp 18   Ht 1.6 m (5' 3\")   Wt 79 kg (174 lb 3.2 oz)   Breastfeeding No   BMI 30.86 kg/m       General appearance: well-hydrated, A&O x 3, no apparent distress  Neck: Appropriate symmetry, thyroid not enlarged  Lungs: Equal expansion bilaterally, no accessory muscle use  Heart: No heaves or thrills. No peripheral varicosities  Skin: No rash, lesions, ulcers  Constitutional: See vitals  Abdomen: Soft, non-tender, gravid  Extremities: neg edema, no calf tenderness  Neuro: CN II-XII grossly intact  SVE: 3/50/-2        Assessment/Plan:  29 year old  at 40w1d   I reviewed her prenatal care records.  Her due date was assigned by a 7 week ultrasound placing her 39w4d.  They have a known date of conception, written on the calendar, of 2024.  This is consistent with the ultrasound, but places her at 40w1 day, which is within the range of error for ultrasound under 9 weeks.  Will use her date of conception.    -Her first trimester and third trimester labs were normal including Rh+, RI and GBS neg.    -She has had the flu shot, Tdap and RSV this pregnancy.  Declined covid vaccine.    She had normal anatomy ultrasound and " NIPT.    -We discussed induction of labor with pitocin.  She would like to proceed as soon as she can.  She was transferred to L&D today.    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  Will start Valtrex as she felt she might be getting a cold sore soon.    -Will check a TSH.  History of hypothyroidism not requiring meds.      Jazmine Aponte MD on 1/13/2025 at 12:20 PM

## 2025-01-13 NOTE — PLAN OF CARE
Goal Outcome Evaluation:      Plan of Care Reviewed With: patient    Overall Patient Progress: improvingOverall Patient Progress: improving     Admission note:    Patient arrived to the birthplace at approximately 1215 accompanied by clinic nurse for labor.     Patient was brought to room 2051 and was orientated to the room and call light.     Admission completed.   Vital signs stable.   IV access obtained.   Labs collected.   External fetal monitor applied.   SVE completed in clinic by Dr. Leon 3/50/-2    Dr. Gillis notified of patient arrival and status.     Plan: AROM    Harish Lewis RN on 1/13/2025 at 1:26 PM

## 2025-01-14 PROCEDURE — 250N000013 HC RX MED GY IP 250 OP 250 PS 637: Performed by: OBSTETRICS & GYNECOLOGY

## 2025-01-14 PROCEDURE — 722N000001 HC LABOR CARE VAGINAL DELIVERY SINGLE

## 2025-01-14 PROCEDURE — 120N000001 HC R&B MED SURG/OB

## 2025-01-14 PROCEDURE — 250N000009 HC RX 250: Performed by: OBSTETRICS & GYNECOLOGY

## 2025-01-14 PROCEDURE — 0UQMXZZ REPAIR VULVA, EXTERNAL APPROACH: ICD-10-PCS | Performed by: NURSE ANESTHETIST, CERTIFIED REGISTERED

## 2025-01-14 RX ORDER — HYDROCORTISONE 25 MG/G
CREAM TOPICAL 3 TIMES DAILY PRN
Status: DISCONTINUED | OUTPATIENT
Start: 2025-01-14 | End: 2025-01-15 | Stop reason: HOSPADM

## 2025-01-14 RX ORDER — MODIFIED LANOLIN
OINTMENT (GRAM) TOPICAL
Status: DISCONTINUED | OUTPATIENT
Start: 2025-01-14 | End: 2025-01-15 | Stop reason: HOSPADM

## 2025-01-14 RX ORDER — DOCUSATE SODIUM 100 MG/1
100 CAPSULE, LIQUID FILLED ORAL DAILY
Status: DISCONTINUED | OUTPATIENT
Start: 2025-01-14 | End: 2025-01-15 | Stop reason: HOSPADM

## 2025-01-14 RX ORDER — TRANEXAMIC ACID 10 MG/ML
1 INJECTION, SOLUTION INTRAVENOUS EVERY 30 MIN PRN
Status: DISCONTINUED | OUTPATIENT
Start: 2025-01-14 | End: 2025-01-15 | Stop reason: HOSPADM

## 2025-01-14 RX ORDER — METHYLERGONOVINE MALEATE 0.2 MG/ML
200 INJECTION INTRAVENOUS
Status: DISCONTINUED | OUTPATIENT
Start: 2025-01-14 | End: 2025-01-15 | Stop reason: HOSPADM

## 2025-01-14 RX ORDER — BISACODYL 10 MG
10 SUPPOSITORY, RECTAL RECTAL DAILY PRN
Status: DISCONTINUED | OUTPATIENT
Start: 2025-01-14 | End: 2025-01-15 | Stop reason: HOSPADM

## 2025-01-14 RX ORDER — IBUPROFEN 800 MG/1
800 TABLET, FILM COATED ORAL EVERY 6 HOURS PRN
Status: DISCONTINUED | OUTPATIENT
Start: 2025-01-14 | End: 2025-01-15 | Stop reason: HOSPADM

## 2025-01-14 RX ORDER — CARBOPROST TROMETHAMINE 250 UG/ML
250 INJECTION, SOLUTION INTRAMUSCULAR
Status: DISCONTINUED | OUTPATIENT
Start: 2025-01-14 | End: 2025-01-15 | Stop reason: HOSPADM

## 2025-01-14 RX ORDER — MISOPROSTOL 200 UG/1
400 TABLET ORAL
Status: DISCONTINUED | OUTPATIENT
Start: 2025-01-14 | End: 2025-01-15 | Stop reason: HOSPADM

## 2025-01-14 RX ORDER — LOPERAMIDE HYDROCHLORIDE 2 MG/1
2 CAPSULE ORAL
Status: DISCONTINUED | OUTPATIENT
Start: 2025-01-14 | End: 2025-01-15 | Stop reason: HOSPADM

## 2025-01-14 RX ORDER — OXYTOCIN 10 [USP'U]/ML
10 INJECTION, SOLUTION INTRAMUSCULAR; INTRAVENOUS
Status: DISCONTINUED | OUTPATIENT
Start: 2025-01-14 | End: 2025-01-15 | Stop reason: HOSPADM

## 2025-01-14 RX ORDER — LOPERAMIDE HYDROCHLORIDE 2 MG/1
4 CAPSULE ORAL
Status: DISCONTINUED | OUTPATIENT
Start: 2025-01-14 | End: 2025-01-15 | Stop reason: HOSPADM

## 2025-01-14 RX ORDER — MISOPROSTOL 200 UG/1
800 TABLET ORAL
Status: DISCONTINUED | OUTPATIENT
Start: 2025-01-14 | End: 2025-01-15 | Stop reason: HOSPADM

## 2025-01-14 RX ORDER — OXYTOCIN/0.9 % SODIUM CHLORIDE 30/500 ML
340 PLASTIC BAG, INJECTION (ML) INTRAVENOUS CONTINUOUS PRN
Status: DISCONTINUED | OUTPATIENT
Start: 2025-01-14 | End: 2025-01-15 | Stop reason: HOSPADM

## 2025-01-14 RX ORDER — ACETAMINOPHEN 325 MG/1
650 TABLET ORAL EVERY 4 HOURS PRN
Status: DISCONTINUED | OUTPATIENT
Start: 2025-01-14 | End: 2025-01-15 | Stop reason: HOSPADM

## 2025-01-14 RX ADMIN — IBUPROFEN 800 MG: 800 TABLET, FILM COATED ORAL at 16:10

## 2025-01-14 RX ADMIN — ACETAMINOPHEN 650 MG: 325 TABLET, FILM COATED ORAL at 22:59

## 2025-01-14 RX ADMIN — IBUPROFEN 800 MG: 800 TABLET, FILM COATED ORAL at 08:12

## 2025-01-14 RX ADMIN — VALACYCLOVIR 500 MG: 500 TABLET, FILM COATED ORAL at 18:54

## 2025-01-14 RX ADMIN — ACETAMINOPHEN 650 MG: 325 TABLET, FILM COATED ORAL at 16:10

## 2025-01-14 RX ADMIN — ACETAMINOPHEN 650 MG: 325 TABLET, FILM COATED ORAL at 08:12

## 2025-01-14 RX ADMIN — ACETAMINOPHEN 650 MG: 325 TABLET, FILM COATED ORAL at 01:32

## 2025-01-14 RX ADMIN — Medication 340 ML/HR: at 00:05

## 2025-01-14 RX ADMIN — BENZOCAINE AND LEVOMENTHOL: 200; 5 SPRAY TOPICAL at 01:32

## 2025-01-14 RX ADMIN — DOCUSATE SODIUM 100 MG: 100 CAPSULE, LIQUID FILLED ORAL at 08:12

## 2025-01-14 RX ADMIN — IBUPROFEN 800 MG: 800 TABLET, FILM COATED ORAL at 22:59

## 2025-01-14 RX ADMIN — VALACYCLOVIR 500 MG: 500 TABLET, FILM COATED ORAL at 03:06

## 2025-01-14 RX ADMIN — IBUPROFEN 800 MG: 800 TABLET, FILM COATED ORAL at 01:32

## 2025-01-14 ASSESSMENT — ACTIVITIES OF DAILY LIVING (ADL)
ADLS_ACUITY_SCORE: 18

## 2025-01-14 NOTE — PROGRESS NOTES
"  2025         S: Pt comfortable with epidural    O: Vital signs:  Temp: 97.8  F (36.6  C) Temp src: Oral BP: 110/72 Pulse: 82   Resp: 16 SpO2: 98 % O2 Device: None (Room air)        Estimated body mass index is 30.86 kg/m  as calculated from the following:    Height as of an earlier encounter on 25: 1.6 m (5' 3\").    Weight as of an earlier encounter on 25: 79 kg (174 lb 3.2 oz).         A&O    SVE:3/50/-2  AROM: clear  FHTs: 120s with moderate variability  [place]: q 1-3min    A/P 29 year old  at 40w1d    Labor: IOL with pitocin and AROM, continue observation  GBS neg, Rh+, RI      Jazmine Aponte MD ....................  2025    6:12 PM   "

## 2025-01-14 NOTE — PLAN OF CARE
"S:Delivery  B:Induced  Labor,40w2d    No results found for: \"GBS\" with antibiotic treatment not indicated 4 hours prior to delivery.  A: Patient delivered   lac periurethral degree at 0001 with Dr. Escalante in attendance and baby placed on mother's abdomen for delayed cord clamping. Baby dried and stimulated. Baby placed  skin to skin @ 0001.. Apgars 9/9.Delivery QBL TBD.  IV infusion of Oxytocin  infused. Placenta removal spontaneous. MD does not want placenta sent to pathology.  See Flowsheet for VS and PP checks.  .  Labor care plan goals met, transition now to postpartum care. Postpartum QBLTBD  R: Expect routine postpartum care. Anticipate first feeding within the hour or whenever infant displays feeding cues. Continue skin to skin. Prior discussion with mother indicates that feeding plan is Breast feeding . Educated mother on importance of exclusive breastfeeding, expected feeding readiness cues and encouraged her to observe for these cues while rooming in. Informed her that breastfeeding assistance would be provided.    "

## 2025-01-14 NOTE — PROGRESS NOTES
Postpartum assessment WDL. VSS. Pain tolerable with Ibuprofen/Tylenol. Breastfeeding going well. Patient caring for self and baby independently with assistance from . Will continue to monitor and assist with cares as needed.

## 2025-01-14 NOTE — PROGRESS NOTES
Labor Note:  Patient comfortable with epidural and sleeping  JAHt=728's and reactive  Contractions=irregular  Cx=deferred  Pitocin at 12mU/min  Continue present care    Eva Escalante DO

## 2025-01-14 NOTE — L&D DELIVERY NOTE
LABOR AND DELIVERY SUMMARY    DELIVERY ATTENDING: Eva Escalante DO   DATE OF DELIVERY: 2025  TYPE OF DELIVERY:      HISTORY:  The patient is a  29 year old female ,  3, para  at 40w2d weeks gestation with a gustafson gestation.  She presented for an elective induction of labor on 2025.  Amniotic fluid was noted to be clear at the time of amniotomy.  She progressed to complete with augmentation.  Fetal heart tones were good in the first stage.  Epidural analgesia.  MISSY.  Nucal cord x1 easily reduced on the perineum.  No terminal meconium.    SECOND STAGE:  Viable male infant delivered spontaneously over an intact perineum at 0001.  The infant breathed and cried spontaneously and did well.  APGARS=9,9  Weight=pending    THIRD STAGE:  The placenta delivered spontaneously.  Right periurethral laceration repaired with #3-0 Vicryl.  Sponge and needle counts were correct.  QBL was 30 cc.  The mother and baby were doing well at the conclusion of the procedure.    Eva Escalante DO

## 2025-01-14 NOTE — PROGRESS NOTES
Obstetrics Service Postpartum Progress Note  Service Date: 01/15/2025    S: Pain well controlled with current pain meds. Lochia minimal.  Eating and drinking without nausea/vomiting.  Ambulating without difficulty.  Voiding without difficulty. Breast feeding.      O:  Patient Vitals for the past 24 hrs:   BP Temp Temp src Pulse Resp   01/15/25 0404 101/64 97.9  F (36.6  C) Oral 72 16   25 100/57 97.8  F (36.6  C) Oral 87 16   25 1126 105/57 97.4  F (36.3  C) Oral 80 16     Gen:  NAD  CV: Well perfused  Resp: Bilateral chest rise and fall  Abd: soft, nondistended, nontender, fundus firm at 2cm below umbilicus  Ext: non-tender, trace edema, no erythema    Hemoglobin   Date Value Ref Range Status   2025 12.0 11.7 - 15.7 g/dL Final   2022 12.6 11.7 - 15.7 g/dL Final     A/P: 29 year old  PPD#1 s/p .    Routine postpartum care: meeting postpartum goals    Hypothyroidism: TSH  nl. Not on medications. Plan for repeat TSH at 6wk postpartum visit.    BEBO/ADHD: denied mood concerns today    Disposition: discharge to home today    Elisa Pérez MD  Obstetrics and Gynecology  01/15/2025

## 2025-01-14 NOTE — PROGRESS NOTES
Patient up in room; ambulated to bathroom with no difficulties. Able to spontaneously void large amount. Transferred to postpartum room via wheelchair in stable condition.

## 2025-01-15 VITALS
TEMPERATURE: 97.7 F | HEART RATE: 84 BPM | OXYGEN SATURATION: 98 % | RESPIRATION RATE: 16 BRPM | DIASTOLIC BLOOD PRESSURE: 56 MMHG | SYSTOLIC BLOOD PRESSURE: 109 MMHG

## 2025-01-15 PROCEDURE — 250N000013 HC RX MED GY IP 250 OP 250 PS 637: Performed by: OBSTETRICS & GYNECOLOGY

## 2025-01-15 RX ADMIN — BENZOCAINE AND LEVOMENTHOL: 200; 5 SPRAY TOPICAL at 09:44

## 2025-01-15 RX ADMIN — VALACYCLOVIR 500 MG: 500 TABLET, FILM COATED ORAL at 07:27

## 2025-01-15 RX ADMIN — ACETAMINOPHEN 650 MG: 325 TABLET, FILM COATED ORAL at 07:26

## 2025-01-15 RX ADMIN — IBUPROFEN 800 MG: 800 TABLET, FILM COATED ORAL at 07:27

## 2025-01-15 ASSESSMENT — ACTIVITIES OF DAILY LIVING (ADL)
ADLS_ACUITY_SCORE: 18

## 2025-01-15 NOTE — DISCHARGE INSTRUCTIONS
Warning Signs after Having a Baby    Keep this paper on your fridge or somewhere else where you can see it.    Call your provider if you have any of these symptoms up to 12 weeks after having your baby.    Thoughts of hurting yourself or your baby  Pain in your chest or trouble breathing  Severe headache not helped by pain medicine  Eyesight concerns (blurry vision, seeing spots or flashes of light, other changes to eyesight)  Fainting, shaking or other signs of a seizure    Call 9-1-1 if you feel that it is an emergency.     The symptoms below can happen to anyone after giving birth. They can be very serious. Call your provider if you have any of these warning signs.    My provider s phone number: _______________________    Losing too much blood (hemorrhage)    Call your provider if you soak through a pad in less than an hour or pass blood clots bigger than a golf ball. These may be signs that you are bleeding too much.    Blood clots in the legs or lungs    After you give birth, your body naturally clots its blood to help prevent blood loss. Sometimes this increased clotting can happen in other areas of the body, like the legs or lungs. This can block your blood flow and be very dangerous.     Call your provider if you:  Have a red, swollen spot on the back of your leg that is warm or painful when you touch it.   Are coughing up blood.     Infection    Call your provider if you have any of these symptoms:  Fever of 100.4 F (38 C) or higher.  Pain or redness around your stitches if you had an incision.   Any yellow, white, or green fluid coming from places where you had stitches or surgery.    Mood Problems (postpartum depression)    Many people feel sad or have mood changes after having a baby. But for some people, these mood swings are worse.     Call your provider right away if you feel so anxious or nervous that you can't care for yourself or your baby.    Preeclampsia (high blood pressure)    Even if you  didn't have high blood pressure when you were pregnant, you are at risk for the high blood pressure disease called preeclampsia. This risk can last up to 12 weeks after giving birth.     Call your provider if you have:   Pain on your right side under your rib cage  Sudden swelling in the hands and face    Remember: You know your body. If something doesn't feel right, get medical help.     For informational purposes only. Not to replace the advice of your health care provider. Copyright 2020 [facility]. All rights reserved. Clinically reviewed by Zeynep Gaffney, RNC-OB, MSN. [employer] [id number] - Rev 02/23.

## 2025-01-15 NOTE — PLAN OF CARE
Patient discharged at 1540 per ambulatory with infant in car seat. Mother verified that her band matches her infant's band by comparing the infant's  MR#.  Discharge instructions given. Encouraged to call for any problems, questions or concerns. RXs picked up.      Plan of Care Reviewed With: patient, spouse

## 2025-01-15 NOTE — PLAN OF CARE
Data: Vital signs within normal limits. Postpartum checks within normal limits - see flow record. Patient eating and drinking normally. Patient able to empty bladder independently and is up ambulating. No apparent signs of infection.  Patient performing self cares and is able to care for infant.  Action: Patient medicated during the shift for cramping. See MAR. Patient reassessed within 1 hour after each medication and pain was improved - patient stated she was comfortable. Patient education done about breast feeding and infant cares. See flow record.  Response: Positive attachment behaviors observed with infant. Support persons are present.   Plan: Anticipate discharge on 1/15

## 2025-01-15 NOTE — DISCHARGE SUMMARY
OB/GYN Discharge Summary    Jmeima Graf MRN# [id number]   Age: 29 year old YOB: 1995     Date of Admission:  2025  Date of Discharge:  01/15/2025  Admitting Physician:  Jazmine Aponte MD  Discharge Physician:  Elisa Pérez MD     Admit Dx:   - at 40w2d   -Hypothyroidism, not on medication  -ADHD/BEBO: not on medication    Discharge Dx:  -Same as above, except below  - s/p  at 40w2d     Procedures:  - Spontaneous vaginal delivery  - Epidural analgesia    Admit HPI/Labor Course:  HISTORY:  The patient is a  29 year old female ,  3, para 2002 at 40w2d weeks gestation with a gustafson gestation.  She presented for an elective induction of labor on 2025.  Amniotic fluid was noted to be clear at the time of amniotomy.  She progressed to complete with augmentation.  Fetal heart tones were good in the first stage.  Epidural analgesia.  MISSY.  Nucal cord x1 easily reduced on the perineum.  No terminal meconium.     SECOND STAGE:  Viable male infant delivered spontaneously over an intact perineum at 0001.  The infant breathed and cried spontaneously and did well.  APGARS=9,9  Weight=3080g     THIRD STAGE:  The placenta delivered spontaneously.  Right periurethral laceration repaired with #3-0 Vicryl.  Sponge and needle counts were correct.  QBL was 30 cc.  The mother and baby were doing well at the conclusion of the procedure.   Please see her Admission H&P and Delivery Summary for further details.    Postpartum Course:  Her postpartum course was uncomplicated. On PPD#1, she was meeting all of her postpartum goals and deemed stable for discharge. She was voiding without difficulty, tolerating a regular diet without nausea and vomiting, her pain was well controlled on oral pain medicines and her lochia was appropriate. Her hemoglobin prior to delivery was 12. Her Rh status was positive, and Rhogam was NOT indicated.     Discharge Medications:  Declined ibuprofen,  tylenol, stool softener.     Review of your medicines        CONTINUE these medicines which have NOT CHANGED        Dose / Directions   acetaminophen 325 MG tablet  Commonly known as: TYLENOL      Dose: 325-650 mg  Take 325-650 mg by mouth every 6 hours as needed for mild pain  Refills: 0     Calcium Carb-Cholecalciferol 600-25 MG-MCG Caps      Dose: 1 tablet  Take 1 tablet by mouth daily.  Refills: 0     coenzyme Q-10 200 MG Caps      Dose: 1 capsule  Take 1 capsule by mouth daily  Refills: 0     prenatal multivitamin w/iron 27-0.8 MG tablet      Dose: 1 tablet  Take 1 tablet by mouth daily  Refills: 0     valACYclovir 500 MG tablet  Commonly known as: VALTREX      Dose: 500 mg  Take 1 tablet (500 mg) by mouth 2 times daily.  Quantity: 30 tablet  Refills: 2     Vitamin D3 125 MCG (5000 UT) tablet  Commonly known as: CHOLECALCIFEROL      Dose: 125 mcg  Take 125 mcg by mouth daily  Refills: 0            STOP taking      IRON PO        magnesium oxide 400 (241.3 Mg) MG tablet  Commonly known as: MAG-OX        RIBOFLAVIN PO               Discharge/Disposition:  Jemima Graf was discharged to home in stable condition with the following instructions:  -Call for temperature > 100.4, bright red vaginal bleeding >1 pad an hour x 2 hours, foul smelling vaginal discharge, pain not controlled by usual oral pain meds, persistent nausea and vomiting not controlled on medications  -For feeding she decided to breast feed.  -She was instructed to follow-up with her primary OB in 6 weeks for a routine postpartum visit.    Elisa Pérez MD  Obstetrics and Gynecology  01/15/2025 8:41 AM

## 2025-04-26 ENCOUNTER — HEALTH MAINTENANCE LETTER (OUTPATIENT)
Age: 30
End: 2025-04-26

## 2025-06-09 ENCOUNTER — TELEPHONE (OUTPATIENT)
Dept: OBGYN | Facility: CLINIC | Age: 30
End: 2025-06-09
Payer: COMMERCIAL

## 2025-06-09 NOTE — TELEPHONE ENCOUNTER
Dr. Pérez has signed form and asked for completion and return to pt.      Rufus messaged pt to ask how wants form returned; awaiting response.    Carmelina Lang  Patient     
The completed paperwork was faxed, per pt's request.    The copies are placed up front for a short time; then they will be sent to scan into patient's record.    Carmelina Lang  Patient     
31-Aug-2024 23:23

## 2025-07-07 NOTE — ANESTHESIA PREPROCEDURE EVALUATION
Anesthesia Pre-Procedure Evaluation    Patient: Jemima Graf   MRN: [id number] : 1995        Procedure : * No procedures listed *          Past Medical History:   Diagnosis Date     ADHD (attention deficit hyperactivity disorder), inattentive type      History of migraine headaches     ff Neurology     IUD (intrauterine device) in place       Past Surgical History:   Procedure Laterality Date     FOOT SURGERY Right      ORTHOPEDIC SURGERY        Allergies   Allergen Reactions     Lisdexamfetamine Headache      Social History     Tobacco Use     Smoking status: Never Smoker     Smokeless tobacco: Never Used   Substance Use Topics     Alcohol use: Never      Wt Readings from Last 1 Encounters:   22 76.4 kg (168 lb 6.4 oz)        Anesthesia Evaluation   Pt has had prior anesthetic.     No history of anesthetic complications       ROS/MED HX  ENT/Pulmonary:  - neg pulmonary ROS     Neurologic:  - neg neurologic ROS     Cardiovascular:  - neg cardiovascular ROS     METS/Exercise Tolerance:     Hematologic:  - neg hematologic  ROS     Musculoskeletal:       GI/Hepatic:     (+) GERD,     Renal/Genitourinary:       Endo:  - neg endo ROS     Psychiatric/Substance Use:     (+) psychiatric history other (comment) and anxiety     Infectious Disease:       Malignancy:       Other:     (-) previous  and TOLAC candidate       Physical Exam    Airway        Mallampati: II   TM distance: > 3 FB   Neck ROM: full   Mouth opening: > 3 cm    Respiratory Devices and Support         Dental  no notable dental history         Cardiovascular   cardiovascular exam normal          Pulmonary   pulmonary exam normal                OUTSIDE LABS:  CBC:   Lab Results   Component Value Date    WBC 8.2 2022    WBC 9.0 2022    HGB 11.7 2022    HGB 11.2 (L) 2022    HCT 33.7 (L) 2022    HCT 34.0 (L) 2022     (L) 2022     (L) 2022     BMP:   Lab Results    Component Value Date     01/02/2022    POTASSIUM 3.9 01/02/2022    POTASSIUM 4.6 07/09/2019    CHLORIDE 105 01/02/2022    CO2 26 01/02/2022    BUN 5 (L) 01/02/2022    CR 0.71 01/02/2022    CR 0.90 07/09/2019    GLC 73 01/02/2022    GLC 88 07/09/2019     COAGS: No results found for: PTT, INR, FIBR  POC:   Lab Results   Component Value Date    HCG Positive (A) 07/06/2021     HEPATIC:   Lab Results   Component Value Date    ALBUMIN 2.6 (L) 01/02/2022    PROTTOTAL 6.5 (L) 01/02/2022    ALT 28 01/02/2022    AST 27 01/02/2022    ALKPHOS 64 01/02/2022    BILITOTAL 0.3 01/02/2022     OTHER:   Lab Results   Component Value Date    WILLIAM 8.4 (L) 01/02/2022       Anesthesia Plan    ASA Status:  2      Anesthesia Type: Epidural.              Consents    Anesthesia Plan(s) and associated risks, benefits, and realistic alternatives discussed. Questions answered and patient/representative(s) expressed understanding.    - Discussed:     - Discussed with:  Patient, Spouse         Postoperative Care            Comments:           neg OB ROS.       HERMES Carlisle CRNA   done

## (undated) RX ORDER — EPINEPHRINE 1 MG/ML
INJECTION, SOLUTION, CONCENTRATE INTRAVENOUS
Status: DISPENSED
Start: 2025-01-13

## (undated) RX ORDER — FENTANYL CITRATE 50 UG/ML
INJECTION, SOLUTION INTRAMUSCULAR; INTRAVENOUS
Status: DISPENSED
Start: 2022-03-17